# Patient Record
Sex: FEMALE | NOT HISPANIC OR LATINO | URBAN - METROPOLITAN AREA
[De-identification: names, ages, dates, MRNs, and addresses within clinical notes are randomized per-mention and may not be internally consistent; named-entity substitution may affect disease eponyms.]

---

## 2017-07-11 ENCOUNTER — APPOINTMENT (OUTPATIENT)
Dept: WOMENS IMAGING | Facility: HOSPITAL | Age: 57
End: 2017-07-11

## 2017-07-11 PROCEDURE — 77067 SCR MAMMO BI INCL CAD: CPT | Performed by: RADIOLOGY

## 2017-07-18 ENCOUNTER — APPOINTMENT (OUTPATIENT)
Dept: WOMENS IMAGING | Facility: HOSPITAL | Age: 57
End: 2017-07-18

## 2017-07-18 PROCEDURE — 76641 ULTRASOUND BREAST COMPLETE: CPT | Performed by: RADIOLOGY

## 2018-10-16 ENCOUNTER — APPOINTMENT (OUTPATIENT)
Dept: WOMENS IMAGING | Facility: HOSPITAL | Age: 58
End: 2018-10-16

## 2018-10-16 PROCEDURE — 77063 BREAST TOMOSYNTHESIS BI: CPT | Performed by: RADIOLOGY

## 2018-10-16 PROCEDURE — 77067 SCR MAMMO BI INCL CAD: CPT | Performed by: RADIOLOGY

## 2018-11-06 ENCOUNTER — APPOINTMENT (OUTPATIENT)
Dept: WOMENS IMAGING | Facility: HOSPITAL | Age: 58
End: 2018-11-06

## 2018-11-06 PROCEDURE — MDREVIEWSP: Performed by: RADIOLOGY

## 2018-11-06 PROCEDURE — 76641 ULTRASOUND BREAST COMPLETE: CPT | Performed by: RADIOLOGY

## 2020-07-06 ENCOUNTER — APPOINTMENT (OUTPATIENT)
Dept: WOMENS IMAGING | Facility: HOSPITAL | Age: 60
End: 2020-07-06

## 2020-07-06 PROCEDURE — 77067 SCR MAMMO BI INCL CAD: CPT | Performed by: RADIOLOGY

## 2021-03-17 ENCOUNTER — HOSPITAL ENCOUNTER (OUTPATIENT)
Dept: VACCINE CLINIC | Facility: HOSPITAL | Age: 61
Discharge: HOME | End: 2021-03-17
Attending: INTERNAL MEDICINE

## 2021-04-07 ENCOUNTER — HOSPITAL ENCOUNTER (OUTPATIENT)
Dept: VACCINE CLINIC | Facility: HOSPITAL | Age: 61
Discharge: HOME | End: 2021-04-07
Attending: INTERNAL MEDICINE

## 2023-10-03 ENCOUNTER — TELEPHONE (OUTPATIENT)
Dept: ORTHOPEDIC SURGERY | Facility: CLINIC | Age: 63
End: 2023-10-03
Payer: COMMERCIAL

## 2023-10-03 NOTE — TELEPHONE ENCOUNTER
[facility] CALLED TO GET PT SCHEDULED - ATTEMPTED TO WT - PLEASE CALL BACK .[phone]    FRACTURED RADIAL HEAD - RIGHT - FALL AT HOME - IMAGING DONE - NO SX    [facility] TO FAX OVER OV NOTES AND IMAGING REPORT

## 2023-10-03 NOTE — TELEPHONE ENCOUNTER
CALLED [facility] TO OBTAIN RECORDS - THEY STATED RECORDS WERE NOT FAXED TO US BECAUSE PAT. WAS ABLE TO GET APPT. TODAY W/KALEIGH & SAM INSTEAD

## 2024-08-12 ENCOUNTER — APPOINTMENT (OUTPATIENT)
Dept: WOMENS IMAGING | Facility: HOSPITAL | Age: 64
End: 2024-08-12
Payer: COMMERCIAL

## 2024-08-12 PROCEDURE — G0279 TOMOSYNTHESIS, MAMMO: HCPCS | Performed by: RADIOLOGY

## 2024-08-12 PROCEDURE — 76642 ULTRASOUND BREAST LIMITED: CPT | Performed by: RADIOLOGY

## 2024-08-12 PROCEDURE — 77061 BREAST TOMOSYNTHESIS UNI: CPT | Performed by: RADIOLOGY

## 2024-08-12 PROCEDURE — 77065 DX MAMMO INCL CAD UNI: CPT | Performed by: RADIOLOGY

## 2024-08-28 ENCOUNTER — APPOINTMENT (OUTPATIENT)
Dept: WOMENS IMAGING | Facility: HOSPITAL | Age: 64
End: 2024-08-28
Payer: COMMERCIAL

## 2024-08-28 ENCOUNTER — LAB REQUISITION (OUTPATIENT)
Dept: LAB | Facility: HOSPITAL | Age: 64
End: 2024-08-28
Payer: COMMERCIAL

## 2024-08-28 DIAGNOSIS — N63.0 UNSPECIFIED LUMP IN UNSPECIFIED BREAST: ICD-10-CM

## 2024-08-28 PROCEDURE — 88360 TUMOR IMMUNOHISTOCHEM/MANUAL: CPT | Performed by: STUDENT IN AN ORGANIZED HEALTH CARE EDUCATION/TRAINING PROGRAM

## 2024-08-28 PROCEDURE — 88341 IMHCHEM/IMCYTCHM EA ADD ANTB: CPT | Performed by: STUDENT IN AN ORGANIZED HEALTH CARE EDUCATION/TRAINING PROGRAM

## 2024-08-28 PROCEDURE — 88342 IMHCHEM/IMCYTCHM 1ST ANTB: CPT | Performed by: STUDENT IN AN ORGANIZED HEALTH CARE EDUCATION/TRAINING PROGRAM

## 2024-08-28 PROCEDURE — 88305 TISSUE EXAM BY PATHOLOGIST: CPT | Performed by: STUDENT IN AN ORGANIZED HEALTH CARE EDUCATION/TRAINING PROGRAM

## 2024-08-28 PROCEDURE — 19083 BX BREAST 1ST LESION US IMAG: CPT | Performed by: RADIOLOGY

## 2024-08-28 PROCEDURE — A4648 IMPLANTABLE TISSUE MARKER: HCPCS | Performed by: RADIOLOGY

## 2024-08-30 DIAGNOSIS — C50.919 MALIGNANT NEOPLASM OF FEMALE BREAST, UNSPECIFIED ESTROGEN RECEPTOR STATUS, UNSPECIFIED LATERALITY, UNSPECIFIED SITE OF BREAST: Primary | ICD-10-CM

## 2024-08-30 LAB
CYTO UR: NORMAL
DX PRELIMINARY: NORMAL
LAB AP CASE REPORT: NORMAL
LAB AP INTRADEPARTMENTAL CONSULT: NORMAL
LAB AP SPECIAL STAINS: NORMAL
LAB AP SYNOPTIC CHECKLIST: NORMAL
PATH REPORT.FINAL DX SPEC: NORMAL
PATH REPORT.GROSS SPEC: NORMAL

## 2024-09-04 ENCOUNTER — PATIENT OUTREACH (OUTPATIENT)
Dept: OTHER | Facility: HOSPITAL | Age: 64
End: 2024-09-04
Payer: COMMERCIAL

## 2024-09-04 DIAGNOSIS — C50.919 MALIGNANT NEOPLASM OF FEMALE BREAST, UNSPECIFIED ESTROGEN RECEPTOR STATUS, UNSPECIFIED LATERALITY, UNSPECIFIED SITE OF BREAST: Primary | ICD-10-CM

## 2024-09-04 NOTE — PROGRESS NOTES
Introductory call placed to Ms. Julien. Introduced myself and navigation services. She stated she is aware of appointment time and place. She will bring her  with her to the consult. She has no immediate needs at this time. Will meet her at her surgery appointment Sept 9th.

## 2024-09-09 ENCOUNTER — OFFICE VISIT (OUTPATIENT)
Dept: SURGERY | Facility: CLINIC | Age: 64
End: 2024-09-09
Payer: COMMERCIAL

## 2024-09-09 ENCOUNTER — PATIENT OUTREACH (OUTPATIENT)
Dept: OTHER | Facility: HOSPITAL | Age: 64
End: 2024-09-09
Payer: COMMERCIAL

## 2024-09-09 VITALS
WEIGHT: 165 LBS | BODY MASS INDEX: 27.49 KG/M2 | SYSTOLIC BLOOD PRESSURE: 130 MMHG | DIASTOLIC BLOOD PRESSURE: 82 MMHG | HEIGHT: 65 IN

## 2024-09-09 DIAGNOSIS — C50.919 MALIGNANT NEOPLASM OF FEMALE BREAST, UNSPECIFIED ESTROGEN RECEPTOR STATUS, UNSPECIFIED LATERALITY, UNSPECIFIED SITE OF BREAST: Primary | ICD-10-CM

## 2024-09-09 PROCEDURE — 99204 OFFICE O/P NEW MOD 45 MIN: CPT | Performed by: STUDENT IN AN ORGANIZED HEALTH CARE EDUCATION/TRAINING PROGRAM

## 2024-09-09 RX ORDER — ROSUVASTATIN CALCIUM 20 MG/1
20 TABLET, COATED ORAL DAILY
COMMUNITY
Start: 2024-07-12 | End: 2025-07-12

## 2024-09-09 RX ORDER — DIPHENOXYLATE HYDROCHLORIDE AND ATROPINE SULFATE 2.5; .025 MG/1; MG/1
TABLET ORAL DAILY
COMMUNITY

## 2024-09-09 RX ORDER — LEVOTHYROXINE SODIUM 25 UG/1
25 TABLET ORAL
COMMUNITY
Start: 2024-07-12 | End: 2025-07-12

## 2024-09-09 NOTE — PROGRESS NOTES
Met MsKieran Anaya before her surgery consult. I introduced myself and navigational services. She has a newly diagnosed Left breast invasive mammary carcinoma; ER/AL+ Her2-; Ki-67 35%. Stage Ia. She will discuss surgery options with Dr. Edouard and call me with any questions after the consult.     She stated she has a wonderful support system with her  and family. She stated she feels comfortable talking to them about needs or issues.      She stated she has no financial or transportation concerns at this time. She has no resource needs or ongoing concerns at this time.      She stated she is doing well emotionally at this time. We discussed we have support options if the need arises. She was thankful for the information.      We discussed integrative therapies and other services [facility]. She received a navigation folder with the following information:     [facility], Cancer and Restorative Exercise (CARE), [employer] Exercise program, Guide for the Newly Diagnosed, [employer], [facility], Massage Therapy, Reiki Therapy, [employer] [place], Cancer Nutrition, and Survivorship Clinic.     She verbalized appreciation for navigational services and she has my contact information and will call with any questions that arise.

## 2024-09-09 NOTE — PROGRESS NOTES
General Surgery Breast Cancer History and Physical Exam     Summary:    Fátima Julien is a 64 y.o. lady who presents with a new diagnosis of left breast invasive mammary carcinoma with DCIS: Grade III, Ki-67 35%, ER+/AL+, Her2-; [id number], Stage I.      A multidisciplinary plan has been formulated for the patient:    (1) Breast Surgical Oncology:  -Follow up [employer] 9 panel genetic testing. I will call her with results.   -MRI to evaluate anatomy as well as for surgical planning. Scheduled 9/16/2024.  -Nurse navigator consult.   -Surgical plan: She believes she would like to proceed with left breast JUSTICE guided lumpectomy with sentinel lymph node biopsy pending MRI results.  -Plastic surgery referral declined.    (2) Medical Oncology:  -Will refer postoperatively for evaluation for endocrine therapy and possible need for chemotherapy.    (3) Radiation Oncology:  -Will refer postoperatively for evaluation for radiation therapy.    Referring Provider: No ref. provider found    Chief Complaint: abnormal breast imaging    History of Present Illness: Ms. Fátima Julien is a 64 y.o. year old lady, seen at the request of No ref. provider found for a new diagnosis of left breast cancer.      This was initially detected as an imaging abnormality. Her last mammogram was 3 years ago, prior to that she was annual. She has had prior bilateral breast biopsy, which all returned as benign. Her work-up is detailed in the oncologic history below.     She denies any breast lumps, pain, skin changes, or nipple discharge. She has a family history of breast cancer in a maternal aunt (age mid to late 30's; contralateral metastatic breast cancer in her 70's). She denies any family history of ovarian cancer.     Workup of Current Diagnosis:    2/20/2024 bilateral Screening Mammogram:  There are scattered areas of fibroglandular density.  Finding 1: There is a focal asymmetry measuring 15 mm with indistinct margins in the anterior one  third of the left breast at 9:00 subareolar.  Suspicious.  Requires additional evaluation.  Finding 2: There is a focal asymmetry measuring 22 mm in the one third region of the left breast at 12-1 o'clock.  Requires additional evaluation.  BI-RADS Category 0    2024 left breast diagnostic Mammogram with Ultrasound:   There are scattered areas of fibroglandular density.  Finding 1: On present exam there is a new isodense irregular mass measuring 15 mm with indistinct margins in the anterior one third of the left breast at 9:00 areolar edge.  Suspicious.  Ultrasound-guided biopsy is recommended.  Finding 2: Additional evaluation was performed for focal asymmetry in the left breast at 12-1 o'clock seen on screening mammogram.  This completely resolves into benign fibroglandular breast tissue.  No sonographic correlate.  Benign.  BI-RADS Category 4C    2024 left breast ultrasound-guided biopsy:   Left breast at 9:00 was imaged and the mass was localized.  12 core specimens were obtained.  A 2 marked vision globe tissue marker was placed.  Clip was in the expected position.  Pathology returned as invasive ductal carcinoma with DCIS which is malignant and concordant.    2024 Pathology:   Final Diagnosis   1.  Breast, left 9 o'clock position AR edge, core biopsy: (Vision clip)  A.  Invasive mammary carcinoma, no special type (ductal), Perry histologic grade 3 (tubules = 3, nuclei = 3, mitoses = 2), measuring 9 mm maximally, and involving approximately 9 core fragments.  B.  Minimal associated ductal carcinoma in situ (DCIS) high nuclear grade with solid and cribriform architecture and spotty single cell necrosis.  C.  No definitive lymphovascular space invasion identified.     2024 Bilateral Breast MRI:       Gynecologic History:   . P:1 AB:1  Age at first childbirth: 22  Lactation/How long: yes  Age at menarche: 17  Age at menopause: 53  Total years of oral contraceptive use: 8 to 10 years  previously  Total years of hormone replacement therapy: On estradiol cream    Past Medical History:   HLD  Hypothyroidism     Past Surgical History:    L carpal tunnel release  Hysterectomy   Right knee arthroscopy   Partial thyroidectomy  Colonoscopy due next year, had one prior     Family History:    As above    Social History:  Denies tobacco use  Occasional alcohol use    Allergies:   Allergies   Allergen Reactions    Penicillin G Nausea And Vomiting     Beta lactam allergy details  Antibiotic reaction:  (n/v)  Age at reaction: adult  Dose to reaction time: unknown  Reason for antibiotic: unknown  Epinephrine required for reaction?: unknown  Tolerated antibiotics: unknown       Tetanus Toxoid Hives     Medications:     Current Outpatient Medications:     levothyroxine (SYNTHROID, LEVOTHROID) 25 MCG tablet, Take 1 tablet by mouth., Disp: , Rfl:     multivitamin (MULTIVITAMIN PO), Take  by mouth Daily., Disp: , Rfl:     rosuvastatin (CRESTOR) 20 MG tablet, Take 1 tablet by mouth Daily., Disp: , Rfl:     VITAMIN D PO, Take 1 tablet by mouth Daily., Disp: , Rfl:     Laboratory Values:    Labs from 5/4/2023 reviewed by me     Review of Systems:   Influenza-like illness: no fever, no  cough, no  sore throat, no  body aches, no loss of sense of taste or smell, no known exposure to person with Covid-19.  Constitutional: Negative for fevers or chills  HENT: Negative for hearing loss or runny nose  Eyes: Negative for vision changes or scleral icterus  Respiratory: Negative for cough or shortness of breath  Cardiovascular: Negative for chest pain or heart palpitations  Gastrointestinal: Negative for abdominal pain, nausea, vomiting, constipation, melena, or hematochezia  Genitourinary: Negative for hematuria or dysuria  Musculoskeletal: Negative for joint swelling or gait instability  Neurologic: Negative for tremors or seizures  Psychiatric: Negative for suicidal ideations or depression  All other systems reviewed and  negative    Physical Exam:   ECO - Asymptomatic  Constitutional: Well-developed well-nourished, no acute distress  Eyes: Conjunctiva normal, sclera nonicteric  ENMT: Hearing grossly normal, oral mucosa moist  Neck: Supple, no palpable mass, trachea midline  Respiratory: Clear to auscultation, normal inspiratory effort  Cardiovascular: Regular rate, no peripheral edema, no jugular venous distention  Breast: symmetric  Right: No visible abnormalities on inspection while seated, with arms raised or hands on hips. No masses, skin changes, or nipple abnormalities.  Left: No visible abnormalities on inspection while seated, with arms raised or hands on hips. No masses, skin changes, or nipple abnormalities.  Biopsy site appreciated in left breast at 9:00 at the NAC edge, otherwise no skin changes.   No clinical chest wall involvement.  Gastrointestinal: Soft, nontender  Lymphatics (palpable nodes): No cervical, supraclavicular or axillary lymphadenopathy  Skin:  Warm, dry, no rash on visualized skin surfaces  Musculoskeletal: Symmetric strength, normal gait  Psychiatric: Alert and oriented ×3, normal affect     Discussion:  I had an extensive discussion with the patient and her family about the nature of her breast cancer diagnosis. We reviewed the components of breast tissue including ducts and lobules. We reviewed her pathology report in detail. We reviewed breast cancer histology, including stage, grade, ER/WA receptors, HER2 receptors and how this applies to her diagnosis. We reviewed the basics of systemic and local/regional management of breast cancer.     We discussed that most breast cancer is not hereditary, however given her family history, this may play a role in her case. I believe genetic testing is warranted and could affect surgical decision making.     We reviewed potential surgical treatments to include partial mastectomy, mastectomy, sentinel lymph node biopsy and axillary node dissection and  discussed the rationale associated with each approach. Regarding radiation therapy, we discussed that radiation is indicated in all cases of breast conservation and in only limited circumstances following mastectomy. We discussed that the primary goal of adjuvant radiation is to decrease the likelihood of local recurrence.     In her case, she is a good candidate for lumpectomy and she would like to proceed with breast conservation. We discussed that lumpectomy would require preoperative wire-localization. We also discussed the risk of positive margins and that she must have negative margins for lumpectomy to be an appropriate oncologic procedure. I will make every effort to obtain negative margins at her initial operation, but there is a 10-15% chance that she will require a second operation for re-excision, or possibly a total mastectomy. We will not know the margin status until after her final pathology has returned.     We discussed axillary staging. I described the procedure for sentinel lymph node biopsy in detail, including the preoperative injection of technetium sulfur colloid and intraoperative injection of lymphazurin blue dye. I explained that this is a mapping test and not a cancer test, that all of the lymph nodes containing these dyes will be removed for complete testing by pathology, and that the results could impact the decision for adjuvant treatment or additional surgery.    I described additional risks and potential complications associated with surgery, including, but not limited to, bleeding, infection, complications related to blue dye, lymphedema, deformity/poor cosmetic result, chronic pain, neurovascular injury, numbness, seroma, hematoma, deep venous thrombosis, skin flap necrosis, disease recurrence and the possibility of requiring additional surgery. We also discussed other treatment options including the option of not undergoing any surgical treatment and the risks associated with this  including disease progression. She expressed an understanding of these factors and wished to proceed.    We discussed that in her case, systemic treatment would involve endocrine therapy and possibly chemotherapy. She will be referred to medical oncology postoperatively to discuss this further.     EUGENE ROBLES M.D.  General and Endoscopic Surgery  [facility]    [address], [address]  [place], [place], [address]  P: [phone]  F: [phone]

## 2024-09-11 ENCOUNTER — PREP FOR SURGERY (OUTPATIENT)
Dept: OTHER | Facility: HOSPITAL | Age: 64
End: 2024-09-11
Payer: COMMERCIAL

## 2024-09-11 ENCOUNTER — PATIENT OUTREACH (OUTPATIENT)
Dept: OTHER | Facility: HOSPITAL | Age: 64
End: 2024-09-11
Payer: COMMERCIAL

## 2024-09-11 DIAGNOSIS — C50.919 MALIGNANT NEOPLASM OF FEMALE BREAST, UNSPECIFIED ESTROGEN RECEPTOR STATUS, UNSPECIFIED LATERALITY, UNSPECIFIED SITE OF BREAST: Primary | ICD-10-CM

## 2024-09-11 RX ORDER — DIAZEPAM 5 MG
10 TABLET ORAL ONCE
OUTPATIENT
Start: 2024-09-11 | End: 2024-09-11

## 2024-09-11 NOTE — PROGRESS NOTES
MsKieran Anaya called with her surgery decision. She stated she feels most comfortable having a double mastectomy with reconstruction. Message sent to Dr. Edouard's office asking to place order for plastics and surgery. She was thankful for the help.

## 2024-09-16 ENCOUNTER — HOSPITAL ENCOUNTER (OUTPATIENT)
Facility: HOSPITAL | Age: 64
Discharge: HOME | End: 2024-09-16
Admitting: STUDENT IN AN ORGANIZED HEALTH CARE EDUCATION/TRAINING PROGRAM
Payer: COMMERCIAL

## 2024-09-16 ENCOUNTER — TELEPHONE (OUTPATIENT)
Dept: SURGERY | Facility: CLINIC | Age: 64
End: 2024-09-16
Payer: COMMERCIAL

## 2024-09-16 DIAGNOSIS — C50.919 MALIGNANT NEOPLASM OF FEMALE BREAST, UNSPECIFIED ESTROGEN RECEPTOR STATUS, UNSPECIFIED LATERALITY, UNSPECIFIED SITE OF BREAST: ICD-10-CM

## 2024-09-16 PROCEDURE — 77049 MRI BREAST C-+ W/CAD BI: CPT

## 2024-09-16 PROCEDURE — 0 GADOBENATE DIMEGLUMINE 529 MG/ML SOLUTION: Performed by: STUDENT IN AN ORGANIZED HEALTH CARE EDUCATION/TRAINING PROGRAM

## 2024-09-16 PROCEDURE — A9577 INJ MULTIHANCE: HCPCS | Performed by: STUDENT IN AN ORGANIZED HEALTH CARE EDUCATION/TRAINING PROGRAM

## 2024-09-16 RX ADMIN — GADOBENATE DIMEGLUMINE 15 ML: 529 INJECTION, SOLUTION INTRAVENOUS at 11:17

## 2024-09-17 DIAGNOSIS — R92.8 ABNORMAL MAMMOGRAM: Primary | ICD-10-CM

## 2024-09-18 ENCOUNTER — PATIENT OUTREACH (OUTPATIENT)
Dept: OTHER | Facility: HOSPITAL | Age: 64
End: 2024-09-18
Payer: COMMERCIAL

## 2024-09-18 ENCOUNTER — TELEPHONE (OUTPATIENT)
Dept: SURGERY | Facility: CLINIC | Age: 64
End: 2024-09-18
Payer: COMMERCIAL

## 2024-09-18 PROBLEM — C50.919 MALIGNANT NEOPLASM OF FEMALE BREAST: Status: ACTIVE | Noted: 2024-09-11

## 2024-09-20 ENCOUNTER — PREP FOR SURGERY (OUTPATIENT)
Dept: OTHER | Facility: HOSPITAL | Age: 64
End: 2024-09-20
Payer: COMMERCIAL

## 2024-09-20 DIAGNOSIS — C50.919 MALIGNANT NEOPLASM OF FEMALE BREAST, UNSPECIFIED ESTROGEN RECEPTOR STATUS, UNSPECIFIED LATERALITY, UNSPECIFIED SITE OF BREAST: Primary | ICD-10-CM

## 2024-09-25 ENCOUNTER — PATIENT OUTREACH (OUTPATIENT)
Dept: OTHER | Facility: HOSPITAL | Age: 64
End: 2024-09-25
Payer: COMMERCIAL

## 2024-10-14 NOTE — NURSING NOTE
Precall placed/no answer/VMM left with my name/#/reason for call/adv. santana @ 0845 @ [facility] Mammo Dept (directns left)/no Aspirin/NSAIDs/Vit E/Fish oil after today/wear bra/may eat/drink/take meds on list prior/need  if wants Valium/call if questns.

## 2024-10-18 ENCOUNTER — HOSPITAL ENCOUNTER (OUTPATIENT)
Dept: MRI IMAGING | Facility: HOSPITAL | Age: 64
Discharge: HOME | End: 2024-10-18
Payer: COMMERCIAL

## 2024-10-18 ENCOUNTER — HOSPITAL ENCOUNTER (OUTPATIENT)
Dept: MAMMOGRAPHY | Facility: HOSPITAL | Age: 64
Discharge: HOME | End: 2024-10-18
Payer: COMMERCIAL

## 2024-10-18 VITALS
SYSTOLIC BLOOD PRESSURE: 116 MMHG | HEART RATE: 67 BPM | OXYGEN SATURATION: 100 % | RESPIRATION RATE: 16 BRPM | BODY MASS INDEX: 27.31 KG/M2 | DIASTOLIC BLOOD PRESSURE: 71 MMHG | WEIGHT: 160 LBS | HEIGHT: 64 IN | TEMPERATURE: 97.3 F

## 2024-10-18 DIAGNOSIS — C50.919 MALIGNANT NEOPLASM OF FEMALE BREAST, UNSPECIFIED ESTROGEN RECEPTOR STATUS, UNSPECIFIED LATERALITY, UNSPECIFIED SITE OF BREAST: ICD-10-CM

## 2024-10-18 DIAGNOSIS — R92.8 ABNORMAL MAMMOGRAM: ICD-10-CM

## 2024-10-18 LAB — CREAT BLDA-MCNC: 0.9 MG/DL (ref 0.6–1.3)

## 2024-10-18 PROCEDURE — 25010000002 LIDOCAINE-EPINEPHRINE 1 %-1:200000 SOLUTION: Performed by: STUDENT IN AN ORGANIZED HEALTH CARE EDUCATION/TRAINING PROGRAM

## 2024-10-18 PROCEDURE — C1894 INTRO/SHEATH, NON-LASER: HCPCS

## 2024-10-18 PROCEDURE — 25010000002 LIDOCAINE 1 % SOLUTION: Performed by: STUDENT IN AN ORGANIZED HEALTH CARE EDUCATION/TRAINING PROGRAM

## 2024-10-18 PROCEDURE — 0 GADOBENATE DIMEGLUMINE 529 MG/ML SOLUTION: Performed by: STUDENT IN AN ORGANIZED HEALTH CARE EDUCATION/TRAINING PROGRAM

## 2024-10-18 PROCEDURE — A9577 INJ MULTIHANCE: HCPCS | Performed by: STUDENT IN AN ORGANIZED HEALTH CARE EDUCATION/TRAINING PROGRAM

## 2024-10-18 PROCEDURE — 82565 ASSAY OF CREATININE: CPT

## 2024-10-18 PROCEDURE — A4648 IMPLANTABLE TISSUE MARKER: HCPCS

## 2024-10-18 PROCEDURE — 77065 DX MAMMO INCL CAD UNI: CPT

## 2024-10-18 RX ORDER — DIAZEPAM 5 MG
5 TABLET ORAL ONCE AS NEEDED
Status: CANCELLED | OUTPATIENT
Start: 2024-10-18

## 2024-10-18 RX ORDER — DIAZEPAM 5 MG
5 TABLET ORAL ONCE AS NEEDED
Status: COMPLETED | OUTPATIENT
Start: 2024-10-18 | End: 2024-10-18

## 2024-10-18 RX ORDER — LIDOCAINE HYDROCHLORIDE 10 MG/ML
10 INJECTION, SOLUTION INFILTRATION; PERINEURAL ONCE
Status: COMPLETED | OUTPATIENT
Start: 2024-10-18 | End: 2024-10-18

## 2024-10-18 RX ADMIN — LIDOCAINE HYDROCHLORIDE 1 ML: 10 INJECTION, SOLUTION INFILTRATION; PERINEURAL at 11:18

## 2024-10-18 RX ADMIN — LIDOCAINE HYDROCHLORIDE 15 ML: 10; .005 INJECTION, SOLUTION EPIDURAL; INFILTRATION; INTRACAUDAL; PERINEURAL at 11:18

## 2024-10-18 RX ADMIN — GADOBENATE DIMEGLUMINE 15 ML: 529 INJECTION, SOLUTION INTRAVENOUS at 11:06

## 2024-10-18 RX ADMIN — DIAZEPAM 5 MG: 5 TABLET ORAL at 10:40

## 2024-10-18 NOTE — NURSING NOTE
Dr. Pelayo in to consent patient. She calls Dr. Edouard to question if both sides need to be biopsied since patient is now going to have bilateral mastectomy. Dr. Edouard and Pierce agree that only the right side needs to be biopsied since patient is now going to have bilateral mastectomies. Patient consented for Rt. Mri biopsy.

## 2024-10-18 NOTE — NURSING NOTE
Biopsy site to right breast clear with Dermabond dry and intact. No firmness or swelling noted at or around biopsy site. Denies pain. Ice pack with protective covering applied to biopsy site. Discharge instructions discussed with understanding voiced by patient. Copies provided to patient. No distress noted. To home via personal vehicle.

## 2024-10-18 NOTE — NURSING NOTE
Spoke with Mary regarding laterality of Mrs. Julien's MRI biopsy today. Patient voiced that she was going to have bilateral masectomy she only had to have right MRI biopsy. Dr. Pelayo's recommendations recommends bilateral. Mary also sees orders for bilateral.

## 2024-10-19 ENCOUNTER — TELEPHONE (OUTPATIENT)
Dept: RADIOLOGY | Facility: HOSPITAL | Age: 64
End: 2024-10-19
Payer: COMMERCIAL

## 2024-10-21 ENCOUNTER — PRE-ADMISSION TESTING (OUTPATIENT)
Dept: PREADMISSION TESTING | Facility: HOSPITAL | Age: 64
End: 2024-10-21
Payer: COMMERCIAL

## 2024-10-21 VITALS
BODY MASS INDEX: 28.58 KG/M2 | RESPIRATION RATE: 18 BRPM | HEIGHT: 64 IN | WEIGHT: 167.4 LBS | TEMPERATURE: 97.2 F | HEART RATE: 78 BPM | OXYGEN SATURATION: 99 % | SYSTOLIC BLOOD PRESSURE: 123 MMHG | DIASTOLIC BLOOD PRESSURE: 71 MMHG

## 2024-10-21 LAB
ANION GAP SERPL CALCULATED.3IONS-SCNC: 11.6 MMOL/L (ref 5–15)
BUN SERPL-MCNC: 15 MG/DL (ref 8–23)
BUN/CREAT SERPL: 17.6 (ref 7–25)
CALCIUM SPEC-SCNC: 9.6 MG/DL (ref 8.6–10.5)
CHLORIDE SERPL-SCNC: 103 MMOL/L (ref 98–107)
CO2 SERPL-SCNC: 24.4 MMOL/L (ref 22–29)
CREAT SERPL-MCNC: 0.85 MG/DL (ref 0.57–1)
CYTO UR: NORMAL
DEPRECATED RDW RBC AUTO: 41.7 FL (ref 37–54)
EGFRCR SERPLBLD CKD-EPI 2021: 77.6 ML/MIN/1.73
ERYTHROCYTE [DISTWIDTH] IN BLOOD BY AUTOMATED COUNT: 12.8 % (ref 12.3–15.4)
GLUCOSE SERPL-MCNC: 103 MG/DL (ref 65–99)
HCT VFR BLD AUTO: 42.7 % (ref 34–46.6)
HGB BLD-MCNC: 13.9 G/DL (ref 12–15.9)
LAB AP CASE REPORT: NORMAL
MCH RBC QN AUTO: 28.9 PG (ref 26.6–33)
MCHC RBC AUTO-ENTMCNC: 32.6 G/DL (ref 31.5–35.7)
MCV RBC AUTO: 88.8 FL (ref 79–97)
PATH REPORT.FINAL DX SPEC: NORMAL
PATH REPORT.GROSS SPEC: NORMAL
PLATELET # BLD AUTO: 277 10*3/MM3 (ref 140–450)
PMV BLD AUTO: 9.3 FL (ref 6–12)
POTASSIUM SERPL-SCNC: 4.1 MMOL/L (ref 3.5–5.2)
RBC # BLD AUTO: 4.81 10*6/MM3 (ref 3.77–5.28)
SODIUM SERPL-SCNC: 139 MMOL/L (ref 136–145)
WBC NRBC COR # BLD AUTO: 5.87 10*3/MM3 (ref 3.4–10.8)

## 2024-10-21 PROCEDURE — 36415 COLL VENOUS BLD VENIPUNCTURE: CPT

## 2024-10-21 PROCEDURE — 80048 BASIC METABOLIC PNL TOTAL CA: CPT

## 2024-10-21 PROCEDURE — 85027 COMPLETE CBC AUTOMATED: CPT

## 2024-10-21 NOTE — DISCHARGE INSTRUCTIONS
Take the following medications the morning of surgery:  LEVOTHYROXINE      If you are on prescription narcotic pain medication to control your pain you may also take that medication the morning of surgery.      General Instructions:     Do not eat solid food after midnight the night before surgery.  Clear liquids day of surgery are allowed but must be stopped at least two hours before your hospital arrival time.       Allowed clear liquids      Water, sodas, and tea or coffee with no cream or milk added.       12 to 20 ounces of a clear liquid that contains carbohydrates is recommended.  If non-diabetic, have Gatorade or Powerade.  If diabetic, have G2 or Powerade Zero.     Do not have liquids red in color.  Do not consume chicken, beef, pork or vegetable broth or bouillon cubes of any variety as they are not considered clear liquids and are not allowed.      Infants may have breast milk up to four hours before surgery.  Infants drinking formula may drink formula up to six hours before surgery.   Patients who avoid smoking, chewing tobacco and alcohol for 4 weeks prior to surgery have a reduced risk of post-operative complications.  Quit smoking as many days before surgery as you can.  Do not smoke, use chewing tobacco or drink alcohol the day of surgery.   If applicable bring your C-PAP/ BI-PAP machine in with you to preop day of surgery.  Bring any papers given to you in the doctor’s office.  Wear clean comfortable clothes.  Do not wear contact lenses, false eyelashes or make-up.  Bring a case for your glasses.   Bring crutches or walker if applicable.  Remove all piercings.  Leave jewelry and any other valuables at home.  Hair extensions with metal clips must be removed prior to surgery.  The Pre-Admission Testing nurse will instruct you to bring medications if unable to obtain an accurate list in Pre-Admission Testing.        If you were given a blood bank ID arm band remember to bring it with you the day of  surgery.    Preventing a Surgical Site Infection:  For 2 to 3 days before surgery, avoid shaving with a razor because the razor can irritate skin and make it easier to develop an infection.    Any areas of open skin can increase the risk of a post-operative wound infection by allowing bacteria to enter and travel throughout the body.  Notify your surgeon if you have any skin wounds / rashes even if it is not near the expected surgical site.  The area will need assessed to determine if surgery should be delayed until it is healed.  The night prior to surgery shower using a fresh bar of anti-bacterial soap (such as Dial) and clean washcloth.  Sleep in a clean bed with clean clothing.  Do not allow pets to sleep with you.  Shower on the morning of surgery using a fresh bar of anti-bacterial soap (such as Dial) and clean washcloth.  Dry with a clean towel and dress in clean clothing.  Ask your surgeon if you will be receiving antibiotics prior to surgery.  Make sure you, your family, and all healthcare providers clean their hands with soap and water or an alcohol based hand  before caring for you or your wound.      CHLORHEXIDINE CLOTH INSTRUCTIONS  The morning of surgery follow these instructions using the Chlorhexidine cloths you've been given.  These steps reduce bacteria on the body.  Do not use the cloths near your eyes, ears mouth, genitalia or on open wounds.  Throw the cloths away after use but do not try to flush them down a toilet.      Open and remove one cloth at a time from the package.    Leave the cloth unfolded and begin the bathing.  Massage the skin with the cloths using gentle pressure to remove bacteria.  Do not scrub harshly.   Follow the steps below with one 2% CHG cloth per area (6 total cloths).  One cloth for neck, shoulders and chest.  One cloth for both arms, hands, fingers and underarms (do underarms last).  One cloth for the abdomen followed by groin.  One cloth for right leg and  foot including between the toes.  One cloth for left leg and foot including between the toes.  The last cloth is to be used for the back of the neck, back and buttocks.    Allow the CHG to air dry 3 minutes on the skin which will give it time to work and decrease the chance of irritation.  The skin may feel sticky until it is dry.  Do not rinse with water or any other liquid or you will lose the beneficial effects of the CHG.  If mild skin irritation occurs, do rinse the skin to remove the CHG.  Report this to the nurse at time of admission.  Do not apply lotions, creams, ointments, deodorants or perfumes after using the clothes. Dress in clean clothes before coming to the hospital.      Day of surgery:  Your arrival time is approximately two hours before your scheduled surgery time.  Please note if you have an early arrival time the surgery doors do not open before 5:00 AM.  Upon arrival, a Pre-op nurse and Anesthesiologist will review your health history, obtain vital signs, and answer questions you may have.  The only belongings needed at this time will be a list of your home medications and if applicable your C-PAP/BI-PAP machine.  A Pre-op nurse will start an IV and you may receive medication in preparation for surgery, including something to help you relax.     Please be aware that surgery does come with discomfort.  We want to make every effort to control your discomfort so please discuss any uncontrolled symptoms with your nurse.   Your doctor will most likely have prescribed pain medications.      If you are going home after surgery you will receive individualized written care instructions before being discharged.  A responsible adult must drive you to and from the hospital on the day of your surgery and ideally stay with you through the night.   .  Discharge prescriptions can be filled by the hospital pharmacy during regular pharmacy hours.  If you are having surgery late in the day/evening your  prescription may be e-prescribed to your pharmacy.  Please verify your pharmacy hours or chose a 24 hour pharmacy to avoid not having access to your prescription because your pharmacy has closed for the day.    If you are staying overnight following surgery, you will be transported to your hospital room following the recovery period.  [facility] has all private rooms.    If you have any questions please call Pre-Admission Testing at [phone].  Deductibles and co-payments are collected on the day of service. Please be prepared to pay the required co-pay, deductible or deposit on the day of service as defined by your plan.    Call your surgeon immediately if you experience any of the following symptoms:  Sore Throat  Shortness of Breath or difficulty breathing  Cough  Chills  Body soreness or muscle pain  Headache  Fever  New loss of taste or smell  Do not arrive for your surgery ill.  Your procedure will need to be rescheduled to another time.  You will need to call your physician before the day of surgery to avoid any unnecessary exposure to hospital staff as well as other patients.

## 2024-10-23 ENCOUNTER — TELEPHONE (OUTPATIENT)
Dept: SURGERY | Facility: CLINIC | Age: 64
End: 2024-10-23
Payer: COMMERCIAL

## 2024-10-23 NOTE — TELEPHONE ENCOUNTER
Patient was calling to see if you could give her results of 10/18/24 Biopsy. Please advise, Thank You

## 2024-10-24 ENCOUNTER — TELEPHONE (OUTPATIENT)
Dept: MAMMOGRAPHY | Facility: HOSPITAL | Age: 64
End: 2024-10-24
Payer: COMMERCIAL

## 2024-10-24 NOTE — TELEPHONE ENCOUNTER
Telephoned patient to give breast biopsy results. These returned as benign and patient had no questions or concerns.

## 2024-10-30 ENCOUNTER — PATIENT OUTREACH (OUTPATIENT)
Dept: OTHER | Facility: HOSPITAL | Age: 64
End: 2024-10-30
Payer: COMMERCIAL

## 2024-10-30 NOTE — PROGRESS NOTES
Reviewed chart. Scheduled for surgery tomorrow.    Called patient.  Introduced myself and explained that I work with Gris, breast navigator.  She is ready for surgery tomorrow. She and her  are staying locally tonight (they live in [place]).  The patient has no questions or concerns.    Gris will continue to follow; encouraged her to contact Gris if the need arises. Patient verbalized understanding.

## 2024-10-31 ENCOUNTER — HOSPITAL ENCOUNTER (OUTPATIENT)
Facility: HOSPITAL | Age: 64
Setting detail: OBSERVATION
Discharge: HOME | End: 2024-11-01
Attending: STUDENT IN AN ORGANIZED HEALTH CARE EDUCATION/TRAINING PROGRAM | Admitting: STUDENT IN AN ORGANIZED HEALTH CARE EDUCATION/TRAINING PROGRAM
Payer: COMMERCIAL

## 2024-10-31 ENCOUNTER — ANCILLARY PROCEDURE (OUTPATIENT)
Dept: LAB | Facility: HOSPITAL | Age: 64
End: 2024-10-31
Payer: COMMERCIAL

## 2024-10-31 ENCOUNTER — ANESTHESIA EVENT (OUTPATIENT)
Dept: PERIOP | Facility: HOSPITAL | Age: 64
End: 2024-10-31
Payer: COMMERCIAL

## 2024-10-31 ENCOUNTER — TRANSCRIBE ORDERS (OUTPATIENT)
Dept: LAB | Facility: HOSPITAL | Age: 64
End: 2024-10-31
Payer: COMMERCIAL

## 2024-10-31 ENCOUNTER — HOSPITAL ENCOUNTER (OUTPATIENT)
Dept: NUCLEAR MEDICINE | Facility: HOSPITAL | Age: 64
Discharge: HOME | End: 2024-10-31
Payer: COMMERCIAL

## 2024-10-31 ENCOUNTER — ANESTHESIA (OUTPATIENT)
Dept: PERIOP | Facility: HOSPITAL | Age: 64
End: 2024-10-31
Payer: COMMERCIAL

## 2024-10-31 DIAGNOSIS — C50.912 INVASIVE DUCTAL CARCINOMA OF LEFT BREAST IN FEMALE: ICD-10-CM

## 2024-10-31 DIAGNOSIS — C50.912 INVASIVE DUCTAL CARCINOMA OF LEFT BREAST IN FEMALE: Primary | ICD-10-CM

## 2024-10-31 DIAGNOSIS — C50.919 MALIGNANT NEOPLASM OF FEMALE BREAST, UNSPECIFIED ESTROGEN RECEPTOR STATUS, UNSPECIFIED LATERALITY, UNSPECIFIED SITE OF BREAST: ICD-10-CM

## 2024-10-31 DIAGNOSIS — C50.919 MALIGNANT NEOPLASM OF FEMALE BREAST, UNSPECIFIED ESTROGEN RECEPTOR STATUS, UNSPECIFIED LATERALITY, UNSPECIFIED SITE OF BREAST: Primary | ICD-10-CM

## 2024-10-31 PROCEDURE — 38900 IO MAP OF SENT LYMPH NODE: CPT | Performed by: SPECIALIST/TECHNOLOGIST, OTHER

## 2024-10-31 PROCEDURE — 76098 X-RAY EXAM SURGICAL SPECIMEN: CPT

## 2024-10-31 PROCEDURE — 25010000002 LIDOCAINE 1 % SOLUTION: Performed by: STUDENT IN AN ORGANIZED HEALTH CARE EDUCATION/TRAINING PROGRAM

## 2024-10-31 PROCEDURE — 25010000002 DEXAMETHASONE SODIUM PHOSPHATE 20 MG/5ML SOLUTION: Performed by: NURSE ANESTHETIST, CERTIFIED REGISTERED

## 2024-10-31 PROCEDURE — 0 TECHETIUM TC99M TILMANOCEPT: Performed by: STUDENT IN AN ORGANIZED HEALTH CARE EDUCATION/TRAINING PROGRAM

## 2024-10-31 PROCEDURE — 38792 RA TRACER ID OF SENTINL NODE: CPT

## 2024-10-31 PROCEDURE — 19303 MAST SIMPLE COMPLETE: CPT | Performed by: STUDENT IN AN ORGANIZED HEALTH CARE EDUCATION/TRAINING PROGRAM

## 2024-10-31 PROCEDURE — 25010000002 PROPOFOL 10 MG/ML EMULSION: Performed by: NURSE ANESTHETIST, CERTIFIED REGISTERED

## 2024-10-31 PROCEDURE — G0378 HOSPITAL OBSERVATION PER HR: HCPCS

## 2024-10-31 PROCEDURE — C9290 INJ, BUPIVACAINE LIPOSOME: HCPCS | Performed by: SURGERY

## 2024-10-31 PROCEDURE — 25010000002 CEFAZOLIN PER 500 MG: Performed by: SURGERY

## 2024-10-31 PROCEDURE — 25010000002 ONDANSETRON PER 1 MG: Performed by: NURSE ANESTHETIST, CERTIFIED REGISTERED

## 2024-10-31 PROCEDURE — 25010000002 MAGNESIUM SULFATE PER 500 MG OF MAGNESIUM: Performed by: NURSE ANESTHETIST, CERTIFIED REGISTERED

## 2024-10-31 PROCEDURE — 0 BUPIVACAINE LIPOSOME 1.3 % SUSPENSION 20 ML VIAL: Performed by: SURGERY

## 2024-10-31 PROCEDURE — 25810000003 SODIUM CHLORIDE 0.9 % SOLUTION 250 ML FLEX CONT: Performed by: NURSE ANESTHETIST, CERTIFIED REGISTERED

## 2024-10-31 PROCEDURE — 25010000002 PHENYLEPHRINE 10 MG/ML SOLUTION 5 ML VIAL: Performed by: NURSE ANESTHETIST, CERTIFIED REGISTERED

## 2024-10-31 PROCEDURE — 25010000002 INDOCYANINE GREEN 25 MG RECONSTITUTED SOLUTION: Performed by: NURSE ANESTHETIST, CERTIFIED REGISTERED

## 2024-10-31 PROCEDURE — 25010000002 CEFAZOLIN PER 500 MG: Performed by: STUDENT IN AN ORGANIZED HEALTH CARE EDUCATION/TRAINING PROGRAM

## 2024-10-31 PROCEDURE — 25010000002 LIDOCAINE 2% SOLUTION: Performed by: NURSE ANESTHETIST, CERTIFIED REGISTERED

## 2024-10-31 PROCEDURE — 38900 IO MAP OF SENT LYMPH NODE: CPT | Performed by: STUDENT IN AN ORGANIZED HEALTH CARE EDUCATION/TRAINING PROGRAM

## 2024-10-31 PROCEDURE — A9520 TC99 TILMANOCEPT DIAG 0.5MCI: HCPCS | Performed by: STUDENT IN AN ORGANIZED HEALTH CARE EDUCATION/TRAINING PROGRAM

## 2024-10-31 PROCEDURE — 25810000003 LACTATED RINGERS PER 1000 ML: Performed by: STUDENT IN AN ORGANIZED HEALTH CARE EDUCATION/TRAINING PROGRAM

## 2024-10-31 PROCEDURE — 25010000002 PROPOFOL 200 MG/20ML EMULSION: Performed by: NURSE ANESTHETIST, CERTIFIED REGISTERED

## 2024-10-31 PROCEDURE — 25010000002 HYDROMORPHONE PER 4 MG: Performed by: NURSE ANESTHETIST, CERTIFIED REGISTERED

## 2024-10-31 PROCEDURE — 25010000002 GLYCOPYRROLATE 1 MG/5ML SOLUTION: Performed by: NURSE ANESTHETIST, CERTIFIED REGISTERED

## 2024-10-31 PROCEDURE — 38525 BIOPSY/REMOVAL LYMPH NODES: CPT | Performed by: STUDENT IN AN ORGANIZED HEALTH CARE EDUCATION/TRAINING PROGRAM

## 2024-10-31 PROCEDURE — 88307 TISSUE EXAM BY PATHOLOGIST: CPT | Performed by: STUDENT IN AN ORGANIZED HEALTH CARE EDUCATION/TRAINING PROGRAM

## 2024-10-31 PROCEDURE — C1789 PROSTHESIS, BREAST, IMP: HCPCS | Performed by: STUDENT IN AN ORGANIZED HEALTH CARE EDUCATION/TRAINING PROGRAM

## 2024-10-31 PROCEDURE — 25010000002 CEFAZOLIN PER 500 MG: Performed by: NURSE ANESTHETIST, CERTIFIED REGISTERED

## 2024-10-31 PROCEDURE — 25010000002 SUGAMMADEX 200 MG/2ML SOLUTION: Performed by: NURSE ANESTHETIST, CERTIFIED REGISTERED

## 2024-10-31 PROCEDURE — 25010000002 GENTAMICIN PER 80 MG: Performed by: SURGERY

## 2024-10-31 PROCEDURE — 25010000002 BUPIVACAINE (PF) 0.25 % SOLUTION 10 ML VIAL: Performed by: SURGERY

## 2024-10-31 PROCEDURE — 19303 MAST SIMPLE COMPLETE: CPT | Performed by: SPECIALIST/TECHNOLOGIST, OTHER

## 2024-10-31 PROCEDURE — 25010000002 ISOSULFAN BLUE 1 % SOLUTION: Performed by: STUDENT IN AN ORGANIZED HEALTH CARE EDUCATION/TRAINING PROGRAM

## 2024-10-31 PROCEDURE — 88331 PATH CONSLTJ SURG 1 BLK 1SPC: CPT | Performed by: STUDENT IN AN ORGANIZED HEALTH CARE EDUCATION/TRAINING PROGRAM

## 2024-10-31 DEVICE — DEV CONTRL TISS STRATAFIXSPIRALMNCRYL PLSPS2 REV3/0 45CM: Type: IMPLANTABLE DEVICE | Site: BREAST | Status: FUNCTIONAL

## 2024-10-31 DEVICE — CLIP LIGAT VASC HORIZON TI MD BLU 6CT: Type: IMPLANTABLE DEVICE | Site: BREAST | Status: FUNCTIONAL

## 2024-10-31 DEVICE — IMPLANTABLE DEVICE: Type: IMPLANTABLE DEVICE | Site: BREAST | Status: FUNCTIONAL

## 2024-10-31 RX ORDER — LABETALOL HYDROCHLORIDE 5 MG/ML
5 INJECTION, SOLUTION INTRAVENOUS
Status: DISCONTINUED | OUTPATIENT
Start: 2024-10-31 | End: 2024-10-31 | Stop reason: HOSPADM

## 2024-10-31 RX ORDER — OXYCODONE HYDROCHLORIDE 5 MG/1
5 TABLET ORAL EVERY 4 HOURS PRN
Status: DISCONTINUED | OUTPATIENT
Start: 2024-10-31 | End: 2024-11-01 | Stop reason: HOSPADM

## 2024-10-31 RX ORDER — ISOSULFAN BLUE 50 MG/5ML
INJECTION, SOLUTION SUBCUTANEOUS AS NEEDED
Status: DISCONTINUED | OUTPATIENT
Start: 2024-10-31 | End: 2024-10-31 | Stop reason: HOSPADM

## 2024-10-31 RX ORDER — IPRATROPIUM BROMIDE AND ALBUTEROL SULFATE 2.5; .5 MG/3ML; MG/3ML
3 SOLUTION RESPIRATORY (INHALATION) ONCE AS NEEDED
Status: DISCONTINUED | OUTPATIENT
Start: 2024-10-31 | End: 2024-10-31 | Stop reason: HOSPADM

## 2024-10-31 RX ORDER — SODIUM CHLORIDE 0.9 % (FLUSH) 0.9 %
3-10 SYRINGE (ML) INJECTION AS NEEDED
Status: DISCONTINUED | OUTPATIENT
Start: 2024-10-31 | End: 2024-10-31 | Stop reason: HOSPADM

## 2024-10-31 RX ORDER — LIDOCAINE 40 MG/G
1 CREAM TOPICAL ONCE
Status: COMPLETED | OUTPATIENT
Start: 2024-10-31 | End: 2024-10-31

## 2024-10-31 RX ORDER — MAGNESIUM SULFATE HEPTAHYDRATE 500 MG/ML
INJECTION, SOLUTION INTRAMUSCULAR; INTRAVENOUS AS NEEDED
Status: DISCONTINUED | OUTPATIENT
Start: 2024-10-31 | End: 2024-10-31 | Stop reason: SURG

## 2024-10-31 RX ORDER — GLYCOPYRROLATE 0.2 MG/ML
INJECTION INTRAMUSCULAR; INTRAVENOUS AS NEEDED
Status: DISCONTINUED | OUTPATIENT
Start: 2024-10-31 | End: 2024-10-31 | Stop reason: SURG

## 2024-10-31 RX ORDER — GABAPENTIN 100 MG/1
100 CAPSULE ORAL 3 TIMES DAILY
Status: DISCONTINUED | OUTPATIENT
Start: 2024-10-31 | End: 2024-10-31 | Stop reason: SDUPTHER

## 2024-10-31 RX ORDER — NALOXONE HCL 0.4 MG/ML
0.2 VIAL (ML) INJECTION AS NEEDED
Status: DISCONTINUED | OUTPATIENT
Start: 2024-10-31 | End: 2024-10-31 | Stop reason: HOSPADM

## 2024-10-31 RX ORDER — ONDANSETRON 2 MG/ML
4 INJECTION INTRAMUSCULAR; INTRAVENOUS ONCE AS NEEDED
Status: DISCONTINUED | OUTPATIENT
Start: 2024-10-31 | End: 2024-10-31 | Stop reason: HOSPADM

## 2024-10-31 RX ORDER — LEVOTHYROXINE SODIUM 25 UG/1
25 TABLET ORAL
Status: DISCONTINUED | OUTPATIENT
Start: 2024-11-01 | End: 2024-11-01 | Stop reason: HOSPADM

## 2024-10-31 RX ORDER — PROPOFOL 10 MG/ML
INJECTION, EMULSION INTRAVENOUS AS NEEDED
Status: DISCONTINUED | OUTPATIENT
Start: 2024-10-31 | End: 2024-10-31 | Stop reason: SURG

## 2024-10-31 RX ORDER — SODIUM CHLORIDE 0.9 % (FLUSH) 0.9 %
10 SYRINGE (ML) INJECTION AS NEEDED
Status: DISCONTINUED | OUTPATIENT
Start: 2024-10-31 | End: 2024-11-01 | Stop reason: HOSPADM

## 2024-10-31 RX ORDER — KETAMINE HCL IN NACL, ISO-OSM 100MG/10ML
SYRINGE (ML) INJECTION AS NEEDED
Status: DISCONTINUED | OUTPATIENT
Start: 2024-10-31 | End: 2024-10-31 | Stop reason: SURG

## 2024-10-31 RX ORDER — HYDRALAZINE HYDROCHLORIDE 20 MG/ML
5 INJECTION INTRAMUSCULAR; INTRAVENOUS
Status: DISCONTINUED | OUTPATIENT
Start: 2024-10-31 | End: 2024-10-31 | Stop reason: HOSPADM

## 2024-10-31 RX ORDER — IBUPROFEN 800 MG/1
800 TABLET, FILM COATED ORAL EVERY 8 HOURS
Status: DISCONTINUED | OUTPATIENT
Start: 2024-10-31 | End: 2024-11-01 | Stop reason: HOSPADM

## 2024-10-31 RX ORDER — LIDOCAINE HYDROCHLORIDE 20 MG/ML
INJECTION, SOLUTION INFILTRATION; PERINEURAL AS NEEDED
Status: DISCONTINUED | OUTPATIENT
Start: 2024-10-31 | End: 2024-10-31 | Stop reason: SURG

## 2024-10-31 RX ORDER — DROPERIDOL 2.5 MG/ML
0.62 INJECTION, SOLUTION INTRAMUSCULAR; INTRAVENOUS
Status: DISCONTINUED | OUTPATIENT
Start: 2024-10-31 | End: 2024-10-31 | Stop reason: HOSPADM

## 2024-10-31 RX ORDER — EPHEDRINE SULFATE 50 MG/ML
5 INJECTION, SOLUTION INTRAVENOUS ONCE AS NEEDED
Status: DISCONTINUED | OUTPATIENT
Start: 2024-10-31 | End: 2024-10-31 | Stop reason: HOSPADM

## 2024-10-31 RX ORDER — ROSUVASTATIN CALCIUM 20 MG/1
20 TABLET, COATED ORAL DAILY
Status: DISCONTINUED | OUTPATIENT
Start: 2024-10-31 | End: 2024-11-01 | Stop reason: HOSPADM

## 2024-10-31 RX ORDER — SODIUM CHLORIDE 9 MG/ML
40 INJECTION, SOLUTION INTRAVENOUS AS NEEDED
Status: DISCONTINUED | OUTPATIENT
Start: 2024-10-31 | End: 2024-11-01 | Stop reason: HOSPADM

## 2024-10-31 RX ORDER — ONDANSETRON 2 MG/ML
4 INJECTION INTRAMUSCULAR; INTRAVENOUS EVERY 6 HOURS PRN
Status: DISCONTINUED | OUTPATIENT
Start: 2024-10-31 | End: 2024-11-01 | Stop reason: HOSPADM

## 2024-10-31 RX ORDER — SODIUM CHLORIDE 0.9 % (FLUSH) 0.9 %
10 SYRINGE (ML) INJECTION EVERY 12 HOURS SCHEDULED
Status: DISCONTINUED | OUTPATIENT
Start: 2024-10-31 | End: 2024-11-01 | Stop reason: HOSPADM

## 2024-10-31 RX ORDER — GABAPENTIN 300 MG/1
300 CAPSULE ORAL EVERY 8 HOURS SCHEDULED
Status: DISCONTINUED | OUTPATIENT
Start: 2024-10-31 | End: 2024-11-01 | Stop reason: HOSPADM

## 2024-10-31 RX ORDER — INDOCYANINE GREEN AND WATER 25 MG
KIT INJECTION AS NEEDED
Status: DISCONTINUED | OUTPATIENT
Start: 2024-10-31 | End: 2024-10-31 | Stop reason: SURG

## 2024-10-31 RX ORDER — FAMOTIDINE 10 MG/ML
20 INJECTION, SOLUTION INTRAVENOUS ONCE
Status: COMPLETED | OUTPATIENT
Start: 2024-10-31 | End: 2024-10-31

## 2024-10-31 RX ORDER — FENTANYL CITRATE 50 UG/ML
50 INJECTION, SOLUTION INTRAMUSCULAR; INTRAVENOUS
Status: DISCONTINUED | OUTPATIENT
Start: 2024-10-31 | End: 2024-10-31 | Stop reason: HOSPADM

## 2024-10-31 RX ORDER — SCOLOPAMINE TRANSDERMAL SYSTEM 1 MG/1
1 PATCH, EXTENDED RELEASE TRANSDERMAL ONCE
Status: DISCONTINUED | OUTPATIENT
Start: 2024-10-31 | End: 2024-10-31

## 2024-10-31 RX ORDER — DEXMEDETOMIDINE HYDROCHLORIDE 4 UG/ML
INJECTION, SOLUTION INTRAVENOUS CONTINUOUS PRN
Status: DISCONTINUED | OUTPATIENT
Start: 2024-10-31 | End: 2024-10-31 | Stop reason: SURG

## 2024-10-31 RX ORDER — MIDAZOLAM HYDROCHLORIDE 1 MG/ML
1 INJECTION, SOLUTION INTRAMUSCULAR; INTRAVENOUS
Status: DISCONTINUED | OUTPATIENT
Start: 2024-10-31 | End: 2024-10-31 | Stop reason: HOSPADM

## 2024-10-31 RX ORDER — FENTANYL CITRATE 50 UG/ML
50 INJECTION, SOLUTION INTRAMUSCULAR; INTRAVENOUS ONCE AS NEEDED
Status: DISCONTINUED | OUTPATIENT
Start: 2024-10-31 | End: 2024-10-31 | Stop reason: HOSPADM

## 2024-10-31 RX ORDER — DEXAMETHASONE SODIUM PHOSPHATE 4 MG/ML
INJECTION, SOLUTION INTRA-ARTICULAR; INTRALESIONAL; INTRAMUSCULAR; INTRAVENOUS; SOFT TISSUE AS NEEDED
Status: DISCONTINUED | OUTPATIENT
Start: 2024-10-31 | End: 2024-10-31 | Stop reason: SURG

## 2024-10-31 RX ORDER — FLUMAZENIL 0.1 MG/ML
0.2 INJECTION INTRAVENOUS AS NEEDED
Status: DISCONTINUED | OUTPATIENT
Start: 2024-10-31 | End: 2024-10-31 | Stop reason: HOSPADM

## 2024-10-31 RX ORDER — HYDROMORPHONE HYDROCHLORIDE 1 MG/ML
0.5 INJECTION, SOLUTION INTRAMUSCULAR; INTRAVENOUS; SUBCUTANEOUS
Status: DISCONTINUED | OUTPATIENT
Start: 2024-10-31 | End: 2024-10-31 | Stop reason: HOSPADM

## 2024-10-31 RX ORDER — HYDROCODONE BITARTRATE AND ACETAMINOPHEN 5; 325 MG/1; MG/1
1 TABLET ORAL ONCE AS NEEDED
Status: COMPLETED | OUTPATIENT
Start: 2024-10-31 | End: 2024-10-31

## 2024-10-31 RX ORDER — DIAZEPAM 5 MG/1
10 TABLET ORAL ONCE
Status: COMPLETED | OUTPATIENT
Start: 2024-10-31 | End: 2024-10-31

## 2024-10-31 RX ORDER — CEFAZOLIN SODIUM 500 MG/2.2ML
INJECTION, POWDER, FOR SOLUTION INTRAMUSCULAR; INTRAVENOUS AS NEEDED
Status: DISCONTINUED | OUTPATIENT
Start: 2024-10-31 | End: 2024-10-31 | Stop reason: SURG

## 2024-10-31 RX ORDER — SODIUM CHLORIDE 0.9 % (FLUSH) 0.9 %
3 SYRINGE (ML) INJECTION EVERY 12 HOURS SCHEDULED
Status: DISCONTINUED | OUTPATIENT
Start: 2024-10-31 | End: 2024-10-31 | Stop reason: HOSPADM

## 2024-10-31 RX ORDER — ROCURONIUM BROMIDE 10 MG/ML
INJECTION, SOLUTION INTRAVENOUS AS NEEDED
Status: DISCONTINUED | OUTPATIENT
Start: 2024-10-31 | End: 2024-10-31 | Stop reason: SURG

## 2024-10-31 RX ORDER — DIPHENHYDRAMINE HYDROCHLORIDE 50 MG/ML
12.5 INJECTION INTRAMUSCULAR; INTRAVENOUS
Status: DISCONTINUED | OUTPATIENT
Start: 2024-10-31 | End: 2024-10-31 | Stop reason: HOSPADM

## 2024-10-31 RX ORDER — CELECOXIB 200 MG/1
200 CAPSULE ORAL EVERY 12 HOURS SCHEDULED
Status: DISCONTINUED | OUTPATIENT
Start: 2024-10-31 | End: 2024-10-31 | Stop reason: SDUPTHER

## 2024-10-31 RX ORDER — OXYCODONE AND ACETAMINOPHEN 7.5; 325 MG/1; MG/1
1 TABLET ORAL EVERY 4 HOURS PRN
Status: DISCONTINUED | OUTPATIENT
Start: 2024-10-31 | End: 2024-10-31 | Stop reason: HOSPADM

## 2024-10-31 RX ORDER — PROMETHAZINE HYDROCHLORIDE 25 MG/1
25 SUPPOSITORY RECTAL ONCE AS NEEDED
Status: DISCONTINUED | OUTPATIENT
Start: 2024-10-31 | End: 2024-10-31 | Stop reason: HOSPADM

## 2024-10-31 RX ORDER — PROMETHAZINE HYDROCHLORIDE 25 MG/1
25 TABLET ORAL ONCE AS NEEDED
Status: DISCONTINUED | OUTPATIENT
Start: 2024-10-31 | End: 2024-10-31 | Stop reason: HOSPADM

## 2024-10-31 RX ORDER — LIDOCAINE HYDROCHLORIDE 10 MG/ML
0.5 INJECTION, SOLUTION INFILTRATION; PERINEURAL ONCE AS NEEDED
Status: COMPLETED | OUTPATIENT
Start: 2024-10-31 | End: 2024-10-31

## 2024-10-31 RX ORDER — ACETAMINOPHEN 500 MG
1000 TABLET ORAL EVERY 8 HOURS
Status: DISCONTINUED | OUTPATIENT
Start: 2024-10-31 | End: 2024-10-31 | Stop reason: SDUPTHER

## 2024-10-31 RX ORDER — SODIUM CHLORIDE, SODIUM LACTATE, POTASSIUM CHLORIDE, CALCIUM CHLORIDE 600; 310; 30; 20 MG/100ML; MG/100ML; MG/100ML; MG/100ML
9 INJECTION, SOLUTION INTRAVENOUS CONTINUOUS
Status: ACTIVE | OUTPATIENT
Start: 2024-10-31 | End: 2024-11-01

## 2024-10-31 RX ORDER — ACETAMINOPHEN 500 MG
1000 TABLET ORAL EVERY 8 HOURS
Status: DISCONTINUED | OUTPATIENT
Start: 2024-10-31 | End: 2024-11-01 | Stop reason: HOSPADM

## 2024-10-31 RX ORDER — ONDANSETRON 2 MG/ML
INJECTION INTRAMUSCULAR; INTRAVENOUS AS NEEDED
Status: DISCONTINUED | OUTPATIENT
Start: 2024-10-31 | End: 2024-10-31 | Stop reason: SURG

## 2024-10-31 RX ADMIN — Medication 10 MG: at 11:57

## 2024-10-31 RX ADMIN — PROPOFOL 200 MG: 10 INJECTION, EMULSION INTRAVENOUS at 08:58

## 2024-10-31 RX ADMIN — DEXMEDETOMIDINE HYDROCHLORIDE 0.3 MCG/KG/HR: 4 INJECTION, SOLUTION INTRAVENOUS at 09:15

## 2024-10-31 RX ADMIN — PHENYLEPHRINE HYDROCHLORIDE 0.3 MCG/KG/MIN: 10 INJECTION, SOLUTION INTRAVENOUS at 10:18

## 2024-10-31 RX ADMIN — LIDOCAINE 4% 1 APPLICATION: 4 CREAM TOPICAL at 06:31

## 2024-10-31 RX ADMIN — ROCURONIUM BROMIDE 10 MG: 10 INJECTION, SOLUTION INTRAVENOUS at 11:00

## 2024-10-31 RX ADMIN — DIAZEPAM 10 MG: 5 TABLET ORAL at 06:24

## 2024-10-31 RX ADMIN — TILMANOCEPT 1 DOSE: KIT at 07:43

## 2024-10-31 RX ADMIN — MAGNESIUM SULFATE HEPTAHYDRATE 2 G: 500 INJECTION, SOLUTION INTRAMUSCULAR; INTRAVENOUS at 09:15

## 2024-10-31 RX ADMIN — ROSUVASTATIN CALCIUM 20 MG: 20 TABLET, FILM COATED ORAL at 17:57

## 2024-10-31 RX ADMIN — ROCURONIUM BROMIDE 10 MG: 10 INJECTION, SOLUTION INTRAVENOUS at 10:00

## 2024-10-31 RX ADMIN — HYDROMORPHONE HYDROCHLORIDE 0.5 MG: 1 INJECTION, SOLUTION INTRAMUSCULAR; INTRAVENOUS; SUBCUTANEOUS at 15:42

## 2024-10-31 RX ADMIN — PROPOFOL 180 MCG/KG/MIN: 10 INJECTION, EMULSION INTRAVENOUS at 09:02

## 2024-10-31 RX ADMIN — FAMOTIDINE 20 MG: 10 INJECTION INTRAVENOUS at 08:09

## 2024-10-31 RX ADMIN — IBUPROFEN 800 MG: 800 TABLET, FILM COATED ORAL at 17:57

## 2024-10-31 RX ADMIN — HYDROCODONE BITARTRATE AND ACETAMINOPHEN 1 TABLET: 5; 325 TABLET ORAL at 15:38

## 2024-10-31 RX ADMIN — Medication 7.5 MG: at 13:23

## 2024-10-31 RX ADMIN — LIDOCAINE HYDROCHLORIDE 0.5 ML: 10 INJECTION, SOLUTION INFILTRATION; PERINEURAL at 06:40

## 2024-10-31 RX ADMIN — Medication 10 MG: at 10:00

## 2024-10-31 RX ADMIN — Medication 7.5 MG: at 11:05

## 2024-10-31 RX ADMIN — SUGAMMADEX 200 MG: 100 INJECTION, SOLUTION INTRAVENOUS at 13:43

## 2024-10-31 RX ADMIN — Medication 20 MG: at 09:15

## 2024-10-31 RX ADMIN — GABAPENTIN 300 MG: 300 CAPSULE ORAL at 17:57

## 2024-10-31 RX ADMIN — ACETAMINOPHEN 1000 MG: 500 TABLET ORAL at 17:57

## 2024-10-31 RX ADMIN — DEXAMETHASONE SODIUM PHOSPHATE 8 MG: 4 INJECTION, SOLUTION INTRAMUSCULAR; INTRAVENOUS at 09:05

## 2024-10-31 RX ADMIN — CEFAZOLIN 2 G: 225 INJECTION, POWDER, FOR SOLUTION INTRAMUSCULAR; INTRAVENOUS at 12:40

## 2024-10-31 RX ADMIN — Medication 10 MG: at 11:00

## 2024-10-31 RX ADMIN — LIDOCAINE HYDROCHLORIDE 80 MG: 20 INJECTION, SOLUTION INFILTRATION; PERINEURAL at 08:58

## 2024-10-31 RX ADMIN — SODIUM CHLORIDE 2000 MG: 900 INJECTION INTRAVENOUS at 08:40

## 2024-10-31 RX ADMIN — ONDANSETRON 4 MG: 2 INJECTION INTRAMUSCULAR; INTRAVENOUS at 13:26

## 2024-10-31 RX ADMIN — GLYCOPYRROLATE 0.2 MG: 0.2 INJECTION INTRAMUSCULAR; INTRAVENOUS at 09:11

## 2024-10-31 RX ADMIN — SCOPALAMINE 1 PATCH: 1 PATCH, EXTENDED RELEASE TRANSDERMAL at 08:09

## 2024-10-31 RX ADMIN — ROCURONIUM BROMIDE 60 MG: 10 INJECTION, SOLUTION INTRAVENOUS at 08:58

## 2024-10-31 RX ADMIN — ROCURONIUM BROMIDE 10 MG: 10 INJECTION, SOLUTION INTRAVENOUS at 11:57

## 2024-10-31 RX ADMIN — SODIUM CHLORIDE, SODIUM LACTATE, POTASSIUM CHLORIDE, CALCIUM CHLORIDE 9 ML/HR: 20; 30; 600; 310 INJECTION, SOLUTION INTRAVENOUS at 06:40

## 2024-10-31 NOTE — H&P
General Surgery Breast Cancer History and Physical Exam      Summary:    Fátima Julien is a 64 y.o. 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       A multidisciplinary plan has been formulated for the patient:    (1) Breast Surgical Oncology:  -Inv[facility]ae 9 panel genetic testing: negative.   -Surgical plan: Plan for bilateral breast mastectomy with left SLN biopsy, possible left axillary dissection.   -Plastic surgery referral with Dr. Mayorga.     (2) Medical Oncology:  -Will refer postoperatively for evaluation for endocrine therapy and possible need for chemotherapy.     (3) Radiation Oncology:  -Will refer postoperatively for evaluation for radiation therapy as clinically indicated.     Referring Provider: No ref. provider found     Chief Complaint: abnormal breast imaging     History of Present Illness: Ms. Fátima Julien is a 64 y.o. year old lady, seen at the request of No ref. provider found for a new diagnosis of left breast cancer.       This was initially detected as an imaging abnormality. Her last mammogram was 3 years ago, prior to that she was annual. She has had prior bilateral breast biopsy, which all returned as benign. Her work-up is detailed in the oncologic history below.      She denies any breast lumps, pain, skin changes, or nipple discharge. She has a family history of breast cancer in a maternal aunt (age mid to late 30's; contralateral metastatic breast cancer in her 70's). She denies any family history of ovarian cancer.      Workup of Current Diagnosis:    2/20/2024 bilateral Screening Mammogram:  There are scattered areas of fibroglandular density.  Finding 1: There is a focal asymmetry measuring 15 mm with indistinct margins in the anterior one third of the left breast at 9:00 subareolar.  Suspicious.  Requires additional evaluation.  Finding 2: There is a focal asymmetry measuring 22  mm in the one third region of the left breast at 12-1 o'clock.  Requires additional evaluation.  BI-RADS Category 0     8/12/2024 left breast diagnostic Mammogram with Ultrasound:   There are scattered areas of fibroglandular density.  Finding 1: On present exam there is a new isodense irregular mass measuring 15 mm with indistinct margins in the anterior one third of the left breast at 9:00 areolar edge.  Suspicious.  Ultrasound-guided biopsy is recommended.  Finding 2: Additional evaluation was performed for focal asymmetry in the left breast at 12-1 o'clock seen on screening mammogram.  This completely resolves into benign fibroglandular breast tissue.  No sonographic correlate.  Benign.  BI-RADS Category 4C     8/28/2024 left breast ultrasound-guided biopsy:   Left breast at 9:00 was imaged and the mass was localized.  12 core specimens were obtained.  A 2 marked vision globe tissue marker was placed.  Clip was in the expected position.  Pathology returned as invasive ductal carcinoma with DCIS which is malignant and concordant.     8/28/2024 Pathology:   Final Diagnosis   1.  Breast, left 9 o'clock position AR edge, core biopsy: (Vision clip)  A.  Invasive mammary carcinoma, no special type (ductal), Perry histologic grade 3 (tubules = 3, nuclei = 3, mitoses = 2), measuring 9 mm maximally, and involving approximately 9 core fragments.  B.  Minimal associated ductal carcinoma in situ (DCIS) high nuclear grade with solid and cribriform architecture and spotty single cell necrosis.  C.  No definitive lymphovascular space invasion identified.      9/16/2024 Bilateral Breast MRI:    IMPRESSION AND RECOMMENDATION:  1.  A 1.6 cm irregular enhancing mass in the anterior left breast represents biopsy-proven malignancy. Recommend surgical management.   2.  Non-mass enhancement measuring 2.3 cm at 8-9 o'clock in the posterior right breast and non-mass enhancement measuring 1.8 cm in the posterior retroareolar left  breast are suspicious. Recommend further  evaluation with bilateral MRI guided core needle biopsy.  An epic in basket message sent to Dr. Edouard on 2024.  BI-RADS Category 4: Suspicious     10/18/2024 Right Breast MRI Guided Biopsy:   IMPRESSION/RECOMMENDATIONS:  1. MRI guided biopsy of 2.3 cm non-mass enhancement at 9:00 in the posterior right breast, marked by a buckle clip. Pathology is benign and concordant with the imaging assessment.  2. Surgical management is again recommended for biopsy-proven left breast malignancy.  Final Diagnosis   1.  Breast, right, 9:00, MRI, biopsy (buckle shaped clip): Benign breast tissue with               -A.  Fibroadenomatoid hyperplasia               -B.  Pseudoangiomatous stromal hyperplasia of the mammary stroma               -C.  Microcalcifications in nonneoplastic tissue               -D.  Florid ductal hyperplasia of the usual type               -E.  Columnar cell change     Gynecologic History:   . P:1 AB:1  Age at first childbirth: 22  Lactation/How long: yes  Age at menarche: 17  Age at menopause: 53  Total years of oral contraceptive use: 8 to 10 years previously  Total years of hormone replacement therapy: On estradiol cream     Past Medical History:   HLD  Hypothyroidism      Past Surgical History:    L carpal tunnel release  Hysterectomy   Right knee arthroscopy   Partial thyroidectomy  Colonoscopy due next year, had one prior      Family History:    As above     Social History:  Denies tobacco use  Occasional alcohol use     Allergies:   Allergies         Allergies   Allergen Reactions    Penicillin G Nausea And Vomiting       Beta lactam allergy details  Antibiotic reaction:  (n/v)  Age at reaction: adult  Dose to reaction time: unknown  Reason for antibiotic: unknown  Epinephrine required for reaction?: unknown  Tolerated antibiotics: unknown       Tetanus Toxoid Hives         Medications:     Current Medications      Current Outpatient Medications:      levothyroxine (SYNTHROID, LEVOTHROID) 25 MCG tablet, Take 1 tablet by mouth., Disp: , Rfl:     multivitamin (MULTIVITAMIN PO), Take  by mouth Daily., Disp: , Rfl:     rosuvastatin (CRESTOR) 20 MG tablet, Take 1 tablet by mouth Daily., Disp: , Rfl:     VITAMIN D PO, Take 1 tablet by mouth Daily., Disp: , Rfl:         Laboratory Values:    Labs from 10/21/2024 reviewed by me      Review of Systems:   Influenza-like illness: no fever, no  cough, no  sore throat, no  body aches, no loss of sense of taste or smell, no known exposure to person with Covid-19.  Constitutional: Negative for fevers or chills  HENT: Negative for hearing loss or runny nose  Eyes: Negative for vision changes or scleral icterus  Respiratory: Negative for cough or shortness of breath  Cardiovascular: Negative for chest pain or heart palpitations  Gastrointestinal: Negative for abdominal pain, nausea, vomiting, constipation, melena, or hematochezia  Genitourinary: Negative for hematuria or dysuria  Musculoskeletal: Negative for joint swelling or gait instability  Neurologic: Negative for tremors or seizures  Psychiatric: Negative for suicidal ideations or depression  All other systems reviewed and negative     Physical Exam:   ECO - Asymptomatic  Constitutional: Well-developed well-nourished, no acute distress  Eyes: Conjunctiva normal, sclera nonicteric  ENMT: Hearing grossly normal, oral mucosa moist  Neck: Supple, no palpable mass, trachea midline  Respiratory: Clear to auscultation, normal inspiratory effort  Cardiovascular: Regular rate, no peripheral edema, no jugular venous distention  Breast: symmetric  Right: No visible abnormalities on inspection while seated, with arms raised or hands on hips. No masses, skin changes, or nipple abnormalities.  Left: No visible abnormalities on inspection while seated, with arms raised or hands on hips. No masses, skin changes, or nipple abnormalities.  No clinical chest wall  involvement.  Gastrointestinal: Soft, nontender  Lymphatics (palpable nodes): No cervical, supraclavicular or axillary lymphadenopathy  Skin:  Warm, dry, no rash on visualized skin surfaces  Musculoskeletal: Symmetric strength, normal gait  Psychiatric: Alert and oriented ×3, normal affect      Discussion:  I had an extensive discussion with the patient and her family about the nature of her breast cancer diagnosis. We reviewed the components of breast tissue including ducts and lobules. We reviewed her pathology report in detail. We reviewed breast cancer histology, including stage, grade, ER/LA receptors, HER2 receptors and how this applies to her diagnosis. We reviewed the basics of systemic and local/regional management of breast cancer.      We discussed that most breast cancer is not hereditary, however given her family history, this may play a role in her case. I believe genetic testing is warranted and could affect surgical decision making.      We reviewed potential surgical treatments to include partial mastectomy, mastectomy, sentinel lymph node biopsy and axillary node dissection and discussed the rationale associated with each approach. Regarding radiation therapy, we discussed that radiation is indicated in all cases of breast conservation and in only limited circumstances following mastectomy. We discussed that the primary goal of adjuvant radiation is to decrease the likelihood of local recurrence.      In her case, she is a good candidate for lumpectomy and she would like to proceed with breast conservation. We discussed that lumpectomy would require preoperative wire-localization. We also discussed the risk of positive margins and that she must have negative margins for lumpectomy to be an appropriate oncologic procedure. I will make every effort to obtain negative margins at her initial operation, but there is a 10-15% chance that she will require a second operation for re-excision, or possibly a  total mastectomy. We will not know the margin status until after her final pathology has returned.      We discussed axillary staging. I described the procedure for sentinel lymph node biopsy in detail, including the preoperative injection of technetium sulfur colloid and intraoperative injection of lymphazurin blue dye. I explained that this is a mapping test and not a cancer test, that all of the lymph nodes containing these dyes will be removed for complete testing by pathology, and that the results could impact the decision for adjuvant treatment or additional surgery.     I described additional risks and potential complications associated with surgery, including, but not limited to, bleeding, infection, complications related to blue dye, lymphedema, deformity/poor cosmetic result, chronic pain, neurovascular injury, numbness, seroma, hematoma, deep venous thrombosis, skin flap necrosis, disease recurrence and the possibility of requiring additional surgery. We also discussed other treatment options including the option of not undergoing any surgical treatment and the risks associated with this including disease progression. She expressed an understanding of these factors and wished to proceed.     We discussed that in her case, systemic treatment would involve endocrine therapy and possibly chemotherapy. She will be referred to medical oncology postoperatively to discuss this further.      EUGENE ROBLES M.D.  General and Endoscopic Surgery  [facility]     [address], [address]  [place], [place], [address]  P: [phone]  F: [phone]

## 2024-10-31 NOTE — PLAN OF CARE
Goal Outcome Evaluation:  Plan of Care Reviewed With: patient        Progress: improving  Outcome Evaluation: Received pt from PACU, A/O, on room air, DTV, dressing to chest is CDI, JAMES's X 2 w/ bloody output, tolerating regular diet, using I/S, SCD's in place, went over drain care teaching, pain well-controlled w/ scheduled pain meds, saline locked

## 2024-10-31 NOTE — ANESTHESIA PREPROCEDURE EVALUATION
Anesthesia Evaluation     Patient summary reviewed and Nursing notes reviewed   history of anesthetic complications:  PONV  NPO Solid Status: > 8 hours  NPO Liquid Status: > 2 hours           Airway   Mallampati: II  TM distance: >3 FB  Neck ROM: full  Dental      Pulmonary    Cardiovascular         Neuro/Psych  GI/Hepatic/Renal/Endo    (+) thyroid problem hypothyroidism    Musculoskeletal     Abdominal    Substance History      OB/GYN          Other                          Anesthesia Plan    ASA 2     general   total IV anesthesia  intravenous induction     Anesthetic plan, risks, benefits, and alternatives have been provided, discussed and informed consent has been obtained with: patient.        CODE STATUS:

## 2024-10-31 NOTE — ANESTHESIA POSTPROCEDURE EVALUATION
Patient: Fátima Julien    Procedure Summary       Date: 10/31/24 Room / Location:  [facility]  [facility]    Anesthesia Start: 0847 Anesthesia Stop: 1405    Procedures:       bilateral skin sparing mastectomy, left sentinel lymph node biopsy (Left: Breast)      BILATERAL GOLDILOCKS RECONSTRUCTION BILATERAL  WITH TISSUE EXPANDER PLACEMENT (Bilateral: Breast) Diagnosis:       Malignant neoplasm of female breast, unspecified estrogen receptor status, unspecified laterality, unspecified site of breast      (Malignant neoplasm of female breast, unspecified estrogen receptor status, unspecified laterality, unspecified site of breast [C50.919])    Surgeons: Stacy Edouard MD; Adan Mayorga MD Provider: Albina Richter MD    Anesthesia Type: general ASA Status: 2            Anesthesia Type: general    Vitals  Vitals Value Taken Time   BP 97/54 10/31/24 1445   Temp 37.2 °C (99 °F) 10/31/24 1405   Pulse 74 10/31/24 1457   Resp 18 10/31/24 1415   SpO2 99 % 10/31/24 1457   Vitals shown include unfiled device data.        Anesthesia Post Evaluation

## 2024-10-31 NOTE — OP NOTE
Operative Report  Date of surgery: 10/31/2024      PREOPERATIVE DIAGNOSIS: left breast cancer  POSTOPERATIVE DIAGNOSIS: Same as preop      OPERATION PERFORMED:   Immediate breast reconstruction with Goldilocks reconstruction  Tissue expander placement, pre-pectoral   Intraoperative SPY angiography    ANESTHESIA: General, local with Exparel (20 cc Exparel mixed with quarter percent Marcaine plain for a total of 40 cc).  All 40 cc was administered      SURGEON: Adan Mayorga MD       Indications for procedure:  Fátima Julien is a 64 y.o. female with left breast DCIS seen by Dr. Edouard for her breast oncology and plan for bilateral skin sparing mastectomy with SNL biopsy. Based on her preoperative exam and her health history, decision was made to proceed with goldilocks mastectomy and tissue expander placement for her reconstruction. The risks, benefits and alternatives of the procedure were discussed in detail with the patient and she elected to proceed. She was marked in the preop area in the upright and standing position.      Procedure narrative:   Patient underwent injection for sentinel node biopsy then was taken to the OR and underwent bilateral mastectomy and left sentinel lymph node biopsy by Dr. Edouard.  She began on the right breast and I began on the left with de-epithelialization of the left dermal pedicle.  Once she had completed her portion on the right a then began reconstruction of the right breast while she completed the mastectomy and sentinel lymph node biopsy on the left.       Upon examination of the breast pocket, good hemostasis was confirmed. The mastectomy flaps were pink and viable overall with no real areas of concern and overall robust and viable. I measured the defect to ensure the correct size tissue expander was utilized. 13cm base width was confirmed. I selected the Natrelle, Moderate height, variable projection smooth tissue expander, 400cc volume.      I recreated the lateral  gutter and IMF had been partially obliterated, I closed down the lateral gutter with 2-0 PDS to recreate lateral breast fold and obliterate dead space between breast and axillary dissection. Next I injected Exparel mixture as a field block into pectoralis, serratus, and mastectomy flaps for postoperative pain control. (Exparel mixture consisted of 20cc Exparel, 20cc 0.25% marcaine plain for a total of 40cc) 20cc of this mixture was infiltrated into each operated breast. I placed a 15F kady drain in lateral axilla and breast cavity and secured in place with 2-0 silk suture. I then performed irrigation with double antibiotic solution containing Kefzol and gent followed by dilute Betadine and obtained hemostasis and set a moist lap pad in the breast cavity while I de-epithelialized the inferior breast flap to used as a dermal pedicle for additional coverage and support over the tissue expander. This was performed with a #10 scalpel then hemostasis obtained with bovie electrocautery.     I then turned my attention to the back table to prepare the tissue expander. I changed my gloves prior to handling the TE.  The expander was filled with 100 cc of sterile injectable saline with a few drops of methylene blue.  The tissue expander was bathed in Betadine and double antibiotic solution.  I then prepared the tissue expander for the contralateral left side on the back table as well.    Prior to placing the expander in the cavity, I performed SPY angiography of the skin flaps and the dermal flap to ensure viability.  I then performed copious irrigation of the pocket with dilute betadine solution followed by double antibiotic solution with Kefzol and gentamicin.    The breast skin was then cleansed with betadine lap and my gloves were changed once again. The expander was then carefully placed into the chest cavity and secured into place using tabs at 12 o'clock, 6 o'clock and 4o'clock and 2 o'clock positions to prevent  rotation. I did place the expander in the pre-pectoral plane. I again confirmed hemostasis then proceeded with closure in layers.  Incisions were closed with 3-0 monocryl in deep dermis followed by 3-0 stratafix running subcuticular suture.     I then performed an identical procedure on the left side securing the tissue expander in place at 12 o'clock, 6 o'clock and 8 o'clock and 10 o'clock positions to prevent rotation.  I then proceeded with closure in layers as above.    Once both tissue expanders had been placed and determined that the mastectomy flaps would close easily without tension, I performed one final SPY angiography of the breast flaps to ensure viability at the conclusion. I then performed an additional 100 cc to each expander for a total of 200 cc per side.    Breasts were cleansed and dried and dressings applied.  Exofin fusion was applied to incision, followed by fluffed kerlex over the incisions as well as an unrolled Kerlix to each axilla and her chest was carefully wrapped with an Ace wrap an ACE. Biopatch and tegaderm to each drain site.     Patient tolerated the procedure well and was taken to PACU in stable condition.     Implant information:   Left: Tissue expander: Natrelle 133S-MV-T (moderate height, variable projection) REF: 629S-NN-12-T; 400cc; SN: [id number]  Right: Tissue expander: Natrelle 133S-MV-T (moderate height, variable projection) REF: [id number]-QG-09-T; 400cc; SN: [id number]     Complications: none     Specimens: None for my portion of the case    mastectomy weights including the de-epithelialized skin:  Left: 721 g  Right: 710 g     Drains: 15F Adan drain each breast/ axilla

## 2024-10-31 NOTE — ANESTHESIA POSTPROCEDURE EVALUATION
Patient: Fátima Julien    Procedure Summary       Date: 10/31/24 Room / Location:  [facility]    Anesthesia Start: 0847 Anesthesia Stop: 1405    Procedures:       bilateral skin sparing mastectomy, left sentinel lymph node biopsy (Left: Breast)      BILATERAL GOLDILOCKS RECONSTRUCTION BILATERAL  WITH TISSUE EXPANDER PLACEMENT (Bilateral: Breast) Diagnosis:       Malignant neoplasm of female breast, unspecified estrogen receptor status, unspecified laterality, unspecified site of breast      (Malignant neoplasm of female breast, unspecified estrogen receptor status, unspecified laterality, unspecified site of breast [C50.919])    Surgeons: Stacy Edouard MD; Adan Mayorga MD Provider: Albina Richter MD    Anesthesia Type: general ASA Status: 2            Anesthesia Type: general    Vitals  Vitals Value Taken Time   BP 97/54 10/31/24 1445   Temp 37.2 °C (99 °F) 10/31/24 1405   Pulse 74 10/31/24 1457   Resp 18 10/31/24 1415   SpO2 99 % 10/31/24 1457   Vitals shown include unfiled device data.        Post Anesthesia Care and Evaluation    Patient location during evaluation: bedside  Patient participation: complete - patient participated  Level of consciousness: awake  Pain management: adequate    Airway patency: patent  Anesthetic complications: No anesthetic complications  PONV Status: controlled  Cardiovascular status: acceptable  Respiratory status: acceptable  Hydration status: acceptable    Comments: BP 97/56   Pulse 79   Temp 37.2 °C (99 °F) (Oral)   Resp 18   SpO2 98%

## 2024-10-31 NOTE — ANESTHESIA PROCEDURE NOTES
Airway  Urgency: elective    Date/Time: 10/31/2024 9:01 AM  Airway not difficult    General Information and Staff    Patient location during procedure: OR  Anesthesiologist: Albina Richter MD  CRNA/CAA: Shannan Moon CRNA    Indications and Patient Condition  Indications for airway management: airway protection    Preoxygenated: yes  MILS not maintained throughout  Mask difficulty assessment: 1 - vent by mask    Final Airway Details  Final airway type: endotracheal airway      Successful airway: ETT  Cuffed: yes   Successful intubation technique: direct laryngoscopy  Facilitating devices/methods: intubating stylet  Endotracheal tube insertion site: oral  Blade: Goodman  Blade size: 2  ETT size (mm): 7.0  Cormack-Lehane Classification: grade I - full view of glottis  Placement verified by: chest auscultation and capnometry   Cuff volume (mL): 6  Measured from: teeth  ETT/EBT  to teeth (cm): 20  Number of attempts at approach: 1  Assessment: lips, teeth, and gum same as pre-op and atraumatic intubation    Additional Comments  Airway exam prior to DL, teeth/lips inspected. Preoxygenated with 100% O2; sniffing position, IV induction, eyes taped. Easy mask ventilation. Atraumatic intubation. ETT connected to vent. Confirmed EBBS, +EtCO2. Lips and teeth inspected, intact, no damage.

## 2024-10-31 NOTE — OP NOTE
OPERATIVE REPORT     DATE: 10/31/2024     SURGEON: Stacy Edouard MD      ASSISTANT: Josue Ibarra, who was present for necessary suctioning, retracting, suturing throughout the procedure      OPERATION PERFORMED: bilateral breast skin spaing mastectomy with sentinel lymph node biopsy      PREOPERATIVE DIAGNOSIS: invasive mammary carcinoma of the left breast      POSTOPERATIVE DIAGNOSIS: Same     ANESTHESIA: general     SPECIMEN:   Right breast (stitch at 12:00)  Left breast (stitch at 12:00)    Left axillary sentinel lymph node #1  Left axillary sentinel lymph node #2   Left axillary sentinel lymph node #3     DRAINS: None     BLOOD LOSS: Minimal     INDICATION FOR OPERATION: Fátima Julien is a 64 y.o. lady who was recently diagnosed with left breast invasive mammary carcinoma. She elected to proceed with bilateral breast skin sparing mastectomy with left sentinel lymph node biopsy was recommended.. All risks (including bleeding, infection, damage to surrounding structures, need for further surgery, positive margins ), benefits and alternatives were explained to the patient who agreed and wished to proceed. Informed consent was signed.      OPERATIVE COURSE: The patient was taken to the operating room, transferred onto the operating room table, and underwent anesthesia without incident. 5 cc of blue dye was injected into the dermal layer of the left nipple areolar complex. The patient was prepped and draped in sterile fashion. A time out was performed and preoperative antibiotics were given.    We began with the right side.  An incision was drawn around the nipple areolar complex as decided by Dr. Mayorga..A scalpel was used to transect the skin and dermal layer. Subcutaneous flaps were created approximately 1cm in thickness circumferentially. These were extended to the clavicle superiorly, the sternum medially, the inframammary fold inferiorly, and the serratus laterally. Once this was circumferentially  dissected free with Bovie electrocautery, the breast was removed from the chest wall at the level of the pectoralis fascia. It was marked as listed above and passed off for fresh permanent specimen. The area was irrigated and appeared hemostatic. Bovie electrocautery was used for any small muscle bleeding.     We then performed a left mastectomy.  An incision was drawn around the nipple areolar complex as decided by Dr. Mayorga..A scalpel was used to transect the skin and dermal layer. Subcutaneous flaps were created approximately 1cm in thickness circumferentially. These were extended to the clavicle superiorly, the sternum medially, the inframammary fold inferiorly, and the serratus laterally. Once this was circumferentially dissected free with Bovie electrocautery, the breast was removed from the chest wall at the level of the pectoralis fascia. It was marked as listed above and passed off for fresh permanent specimen. The area was irrigated and appeared hemostatic. Bovie electrocautery was used for any small muscle bleeding.     We then performed a sentinel lymph node biopsy. Bovie electrocautery was used to dissect down through the subcutaneous tissues and through the clavipectoral fascia into the axilla.  3 [place] lymph nodes were identified. These were removed with Bovie electrocautery and clips across all major lymphovascular structures. Once this was done, there were no hot, blue, or palpable lymph nodes remaining. The area was irrigated and appeared hemostatic.    Dr. Mayorga then performed the reconstruction.  .The patient tolerated the procedure well. All needle and lap counts were correct at the end of the case. The patient was then awoken from anesthesia and taken to recovery for further monitoring.     [place] Node Biopsy for Breast Cancer - Left  Operation performed with curative intent. Yes   Tracer(s) used to identify sentinel nodes in the upfront surgery (non-neoadjuvant) setting (select all that  apply). Dye and Radioactive tracer   Tracer(s) used to identify sentinel nodes in the neoadjuvant setting (select all that apply). N/A   All nodes (colored or non-colored) present at the end of a dye-filled lymphatic channel were removed. Yes   All significantly radioactive nodes were removed. Yes   All palpably suspicious nodes were removed. Yes   Biopsy-proven positive nodes marked with clips prior to chemotherapy were identified and removed. N/A      Stacy Edouard MD   General and Endoscopic Surgery  [facility], [facility]     [address], [address]  [place], [place], [address]  P: [phone]  F: [phone]

## 2024-10-31 NOTE — PERIOPERATIVE NURSING NOTE
PT. TOOK MEDS PER DR. RICHARDS INSTRUCTION THIS AM. STATES SHE TOOK TYLENOL, CELEBREX AND GABAPENTIN.

## 2024-10-31 NOTE — H&P
Plastic Surgery History and Physical Note    Current Date: 10/31/2024  Name: Fátima Julien  Age: 64 y.o.  Sex: female  MRN: [id number]  YOB: 1960    ?  Chief Complaint: Breast cancer  ?  History of Present Illness:  Fátima Julien is a female 64 y.o. here today for bilateral mastectomy with Dr. Edouard and immediate reconstruction with myself patient was previously seen in clinic and all options for reconstruction and questions were answered.  Risks benefits and alternatives of the procedure were discussed in detail with the patient.  She understands and elects proceed.       Family History:   Family History   Problem Relation Age of Onset    Breast cancer Maternal Aunt 40 - 49        diagnosed in her 40s and mid to late 70s    Malig Hyperthermia Neg Hx      Social History:   Social History     Socioeconomic History    Marital status:    Tobacco Use    Smoking status: Never    Smokeless tobacco: Never   Vaping Use    Vaping status: Never Used   Substance and Sexual Activity    Alcohol use: Yes     Alcohol/week: 1.0 standard drink of alcohol     Types: 1 Glasses of wine per week     Comment: occasionally    Drug use: Never    Sexual activity: Defer     Past Medical History:   Past Medical History:   Diagnosis Date    Breast cancer     LEFT BREAST    Hyperlipidemia     Hypotension     PONV (postoperative nausea and vomiting)     SEVERE    Syncope     Thyroid nodule     Ulcerative colitis      Past Surgical History:   Past Surgical History:   Procedure Laterality Date    BREAST BIOPSY Left 2024    BREAST LUMPECTOMY Bilateral     CARPAL TUNNEL RELEASE Left     COLONOSCOPY N/A 2014    [facility]    HX OVARIAN CYSTECTOMY      HYSTERECTOMY      KNEE ARTHROSCOPY Right 2022    THYROIDECTOMY, PARTIAL       Medications:   Current Facility-Administered Medications:     ceFAZolin 2000 mg IVPB in 100 mL NS (MBP), 2,000 mg, Intravenous, Once, Stacy Edouard MD, 2,000 mg at 10/31/24 0840     fentaNYL citrate (PF) (SUBLIMAZE) injection 50 mcg, 50 mcg, Intravenous, Once PRN, Deon Carlisle MD    lactated ringers infusion, 9 mL/hr, Intravenous, Continuous, Deon Carlisle MD, Last Rate: 9 mL/hr at 10/31/24 0640, 9 mL/hr at 10/31/24 0640    midazolam (VERSED) injection 1 mg, 1 mg, Intravenous, Q5 Min PRN, Deon Carlisle MD    scopolamine patch 1 mg/72 hr, 1 patch, Transdermal, Once, Deon Carlisle MD, 1 patch at 10/31/24 0809    sodium chloride 0.9 % flush 3 mL, 3 mL, Intravenous, Q12H, Deon Carlisle MD    sodium chloride 0.9 % flush 3-10 mL, 3-10 mL, Intravenous, PRN, Deon Carlisle MD  Allergies:   Allergies   Allergen Reactions    Tetanus Toxoid Hives    Penicillin G Nausea And Vomiting     Beta lactam allergy details  Antibiotic reaction:  (n/v)  Age at reaction: adult  Dose to reaction time: unknown  Reason for antibiotic: unknown  Epinephrine required for reaction?: unknown  Tolerated antibiotics: unknown   Beta lactam allergy details  Antibiotic reaction: rash  Age at reaction: adult  Dose to reaction time: unknown  Reason for antibiotic: unknown  Epinephrine required for reaction?: no  Tolerated antibiotics: amoxicillin, augmentin         ?  Review of Systems:   Constitutional: Negative for fevers/chills  H&N: Negative for sore throat/rhinorrhea.  Eyes: Negative for dry eye/visual disturbances.  Respiratory: Negative for cough or dyspnea.    Cardiovascular: Negative for cyanosis or chest pain.   Gastrointestinal: Negative for nausea/emesis.  Genitourinary: Negative for frequency/dysuria.   Musculoskeletal: Negative for muscle or joint pain.  Skin: Negative for new rash/lesions.  Endocrine: Negative for excessive thirst/feeling cold.  Hematology: Negative for easy bruising/bleeding.  Neurological: Negative for headaches/tinnitus.    Physical Examination:   /77 (BP Location: Right arm, Patient Position: Sitting)   Pulse 74   Temp 97.8 °F (36.6 °C) (Oral)   Resp 18   SpO2 98%  "  There is no height or weight on file to calculate BMI.     General: Well nourished, well developed, in no acute distress  Eyes: PERRLA, EOM intact, sclerae anicteric, no conjunctival injection  HENT: Normocephalic, atraumatic, mucous membranes moist  Neck: Supple, no thyromegaly, no lymphadenopathy, trachea midline  Respiratory: Negative for wheezing or stridor, non-labored respirations   Cardiovascular: Normal rate, negative for cyanosis, negative for peripheral edema  Abdominal: Soft, non-distended.  Musculoskeletal: Warm and dry extremities. Negative for motor deficits. Able to stand from seated position easily.  Psychiatric: Appropriate affect, cooperative demeanor.  Neurologic: Oriented x 3, negative for gross motor or sensory deficits.  Skin: No rashes or open wounds.  Breasts: Grade 3 ptosis bilaterally no lumps bumps or masses palpated she does have a biopsy site on the right breast which has significant ecchymosis on the lateral quadrant as well as the RAFA areolar area.      Labs:  Lab Results   Component Value Date    WBC 5.87 10/21/2024    HGB 13.9 10/21/2024    HCT 42.7 10/21/2024    MCV 88.8 10/21/2024     10/21/2024     Lab Results   Component Value Date    GLUCOSE 103 (H) 10/21/2024    BUN 15 10/21/2024    CREATININE 0.85 10/21/2024    BCR 17.6 10/21/2024    K 4.1 10/21/2024    CO2 24.4 10/21/2024    CALCIUM 9.6 10/21/2024     No results found for: \"INR\", \"PROTIME\"  No results found for: \"PTT\"  No results found for: \"HGBA1C\"  No results found for: \"ABORH\"      Recent Imaging:   No radiology results for the last day       Assessment and Plan:   Fátima Julien is a 64 y.o. female here today for bilateral mastectomy with immediate reconstruction with [place] reconstruction and tissue expander and ADM placement.  -All questions answered  -Consent signed  -ERAS protocol initiated in preop      Adan Mayorga MD  10/31/24  08:57 EDT           "

## 2024-11-01 VITALS
DIASTOLIC BLOOD PRESSURE: 52 MMHG | RESPIRATION RATE: 16 BRPM | HEART RATE: 72 BPM | OXYGEN SATURATION: 92 % | TEMPERATURE: 98.9 F | SYSTOLIC BLOOD PRESSURE: 91 MMHG

## 2024-11-01 PROCEDURE — G0378 HOSPITAL OBSERVATION PER HR: HCPCS

## 2024-11-01 RX ORDER — GABAPENTIN 300 MG/1
300 CAPSULE ORAL EVERY 8 HOURS SCHEDULED
Qty: 30 CAPSULE | Refills: 0 | Status: SHIPPED | OUTPATIENT
Start: 2024-11-01 | End: 2024-11-11

## 2024-11-01 RX ORDER — ACETAMINOPHEN 500 MG
1000 TABLET ORAL EVERY 8 HOURS
Qty: 60 TABLET | Refills: 0 | Status: SHIPPED | OUTPATIENT
Start: 2024-11-01 | End: 2024-11-11

## 2024-11-01 RX ORDER — IBUPROFEN 800 MG/1
800 TABLET, FILM COATED ORAL EVERY 8 HOURS
Qty: 30 TABLET | Refills: 0 | Status: SHIPPED | OUTPATIENT
Start: 2024-11-01 | End: 2024-11-11

## 2024-11-01 RX ORDER — OXYCODONE HYDROCHLORIDE 5 MG/1
5 TABLET ORAL EVERY 4 HOURS PRN
Qty: 12 TABLET | Refills: 0 | Status: SHIPPED | OUTPATIENT
Start: 2024-11-01

## 2024-11-01 RX ADMIN — IBUPROFEN 800 MG: 800 TABLET, FILM COATED ORAL at 10:24

## 2024-11-01 RX ADMIN — LEVOTHYROXINE SODIUM 25 MCG: 25 TABLET ORAL at 05:13

## 2024-11-01 RX ADMIN — OXYCODONE HYDROCHLORIDE 5 MG: 5 TABLET ORAL at 10:24

## 2024-11-01 RX ADMIN — ACETAMINOPHEN 1000 MG: 500 TABLET ORAL at 10:25

## 2024-11-01 RX ADMIN — Medication 10 ML: at 01:41

## 2024-11-01 RX ADMIN — GABAPENTIN 300 MG: 300 CAPSULE ORAL at 01:40

## 2024-11-01 RX ADMIN — ROSUVASTATIN CALCIUM 20 MG: 20 TABLET, FILM COATED ORAL at 08:39

## 2024-11-01 RX ADMIN — OXYCODONE HYDROCHLORIDE 5 MG: 5 TABLET ORAL at 05:13

## 2024-11-01 RX ADMIN — IBUPROFEN 800 MG: 800 TABLET, FILM COATED ORAL at 01:40

## 2024-11-01 RX ADMIN — ACETAMINOPHEN 1000 MG: 500 TABLET ORAL at 01:40

## 2024-11-01 RX ADMIN — Medication 10 ML: at 08:40

## 2024-11-01 NOTE — PLAN OF CARE
Goal Outcome Evaluation:  Plan of Care Reviewed With: patient           Outcome Evaluation: VSS, on RA, no c/o pain or nausea, walked in hallway, voiding dressing to benny breast CDI, Bello drains x2, bloody/serosang out put, tolerating reg diet, SCDs on when in bed, scheduled meds currently managing pain, using IS, saline locked, will continue to monitor,

## 2024-11-01 NOTE — DISCHARGE SUMMARY
DATE OF ADMISSION:  10/31/2024  DATE OF DISCHARGE:  11/1/2024    ATTENDING:        Stacy Edouard M.D.       GENERAL SURGERY    CONSULTS:    None    PRINCIPAL DIAGNOSIS:     breast cancer    DISCHARGE DIAGNOSES:     breast cancer    HOSPITAL PROCEDURES:     Bilateral breast skin sparing mastectomy with left sentinel lymph node biopsy     HOSPITAL COURSE:   The patient was admitted after the above listed elective procedure. She had an uneventful postoperative course. She was discharged on POD1 with no issues. She was tolerating a diet, her pain was controlled with PO pain medication, and she was ambulating without assistance.    ACTIVITY:  Okay to shower.  Ambulate and climb stairs as tolerated.  No lifting over 10 lbs for 2 weeks.  Okay to drive if not taking pain medications.    DIET:  Regular diet    FOLLOW UP:  With Dr Edouard in 3-4 weeks. Instructed to call [phone] for an appointment.

## 2024-11-01 NOTE — CASE MANAGEMENT/SOCIAL WORK
Case Management Discharge Note      Final Note: Dc home         Selected Continued Care - Discharged on 11/1/2024 Admission date: 10/31/2024 - Discharge disposition: Home or Self Care      Destination    No services have been selected for the patient.                Durable Medical Equipment    No services have been selected for the patient.                Dialysis/Infusion    No services have been selected for the patient.                Home Medical Care    No services have been selected for the patient.                Therapy    No services have been selected for the patient.                Community Resources    No services have been selected for the patient.                Community & DME    No services have been selected for the patient.                         Final Discharge Disposition Code: 01 - home or self-care

## 2024-11-01 NOTE — NURSING NOTE
Discharge instructions provided. Patient received rx from pharmacy before leaving. She is to follow up with Dr. Edouard and Dr. Mayorga. Drain care demonstrated by spouse. No questions/concerns

## 2024-11-04 ENCOUNTER — TELEPHONE (OUTPATIENT)
Dept: SURGERY | Facility: CLINIC | Age: 64
End: 2024-11-04
Payer: COMMERCIAL

## 2024-11-04 DIAGNOSIS — C50.919 MALIGNANT NEOPLASM OF FEMALE BREAST, UNSPECIFIED ESTROGEN RECEPTOR STATUS, UNSPECIFIED LATERALITY, UNSPECIFIED SITE OF BREAST: Primary | ICD-10-CM

## 2024-11-04 NOTE — TELEPHONE ENCOUNTER
Called Fátima Julien regarding recent surgical pathology.    Left breast invasive ductal carcinoma, grade III, margins negative, ER+/GA+, her2-. 0/4 lymph nodes.   [id number]    Referral placed for medical oncology.   Oncotype Dx sent.   Follow up in two weeks for wound check and further cancer surveillance planning.     Stacy Edouard MD

## 2024-11-06 LAB
CYTO UR: NORMAL
LAB AP CASE REPORT: NORMAL
LAB AP CLINICAL INFORMATION: NORMAL
LAB AP DIAGNOSIS COMMENT: NORMAL
LAB AP SYNOPTIC CHECKLIST: NORMAL
Lab: NORMAL
PATH REPORT.ADDENDUM SPEC: NORMAL
PATH REPORT.FINAL DX SPEC: NORMAL
PATH REPORT.GROSS SPEC: NORMAL

## 2024-11-12 ENCOUNTER — TELEPHONE (OUTPATIENT)
Dept: SURGERY | Facility: CLINIC | Age: 64
End: 2024-11-12
Payer: COMMERCIAL

## 2024-11-16 LAB
CYTO UR: NORMAL
LAB AP CASE REPORT: NORMAL
LAB AP CLINICAL INFORMATION: NORMAL
LAB AP DIAGNOSIS COMMENT: NORMAL
LAB AP SYNOPTIC CHECKLIST: NORMAL
Lab: NORMAL
Lab: NORMAL
PATH REPORT.ADDENDUM SPEC: NORMAL
PATH REPORT.FINAL DX SPEC: NORMAL
PATH REPORT.GROSS SPEC: NORMAL

## 2024-11-18 ENCOUNTER — TELEPHONE (OUTPATIENT)
Dept: SURGERY | Facility: CLINIC | Age: 64
End: 2024-11-18
Payer: COMMERCIAL

## 2024-11-26 ENCOUNTER — LAB (OUTPATIENT)
Dept: LAB | Facility: HOSPITAL | Age: 64
End: 2024-11-26
Payer: COMMERCIAL

## 2024-11-26 ENCOUNTER — CONSULT (OUTPATIENT)
Dept: ONCOLOGY | Facility: CLINIC | Age: 64
End: 2024-11-26
Payer: COMMERCIAL

## 2024-11-26 VITALS
SYSTOLIC BLOOD PRESSURE: 113 MMHG | RESPIRATION RATE: 16 BRPM | OXYGEN SATURATION: 98 % | HEART RATE: 69 BPM | BODY MASS INDEX: 28.75 KG/M2 | WEIGHT: 168.4 LBS | HEIGHT: 64 IN | DIASTOLIC BLOOD PRESSURE: 71 MMHG | TEMPERATURE: 97.8 F

## 2024-11-26 DIAGNOSIS — Z17.0 MALIGNANT NEOPLASM OF BREAST IN FEMALE, ESTROGEN RECEPTOR POSITIVE, UNSPECIFIED LATERALITY, UNSPECIFIED SITE OF BREAST: Primary | ICD-10-CM

## 2024-11-26 DIAGNOSIS — R79.89 ABNORMAL CBC: Primary | ICD-10-CM

## 2024-11-26 DIAGNOSIS — C50.919 MALIGNANT NEOPLASM OF BREAST IN FEMALE, ESTROGEN RECEPTOR POSITIVE, UNSPECIFIED LATERALITY, UNSPECIFIED SITE OF BREAST: Primary | ICD-10-CM

## 2024-11-26 LAB
BASOPHILS # BLD AUTO: 0.05 10*3/MM3 (ref 0–0.2)
BASOPHILS NFR BLD AUTO: 0.8 % (ref 0–1.5)
DEPRECATED RDW RBC AUTO: 41.1 FL (ref 37–54)
EOSINOPHIL # BLD AUTO: 0.36 10*3/MM3 (ref 0–0.4)
EOSINOPHIL NFR BLD AUTO: 5.9 % (ref 0.3–6.2)
ERYTHROCYTE [DISTWIDTH] IN BLOOD BY AUTOMATED COUNT: 12.8 % (ref 12.3–15.4)
HCT VFR BLD AUTO: 39.5 % (ref 34–46.6)
HGB BLD-MCNC: 13 G/DL (ref 12–15.9)
IMM GRANULOCYTES # BLD AUTO: 0.01 10*3/MM3 (ref 0–0.05)
IMM GRANULOCYTES NFR BLD AUTO: 0.2 % (ref 0–0.5)
LYMPHOCYTES # BLD AUTO: 2.53 10*3/MM3 (ref 0.7–3.1)
LYMPHOCYTES NFR BLD AUTO: 41.5 % (ref 19.6–45.3)
MCH RBC QN AUTO: 29 PG (ref 26.6–33)
MCHC RBC AUTO-ENTMCNC: 32.9 G/DL (ref 31.5–35.7)
MCV RBC AUTO: 88.2 FL (ref 79–97)
MONOCYTES # BLD AUTO: 0.46 10*3/MM3 (ref 0.1–0.9)
MONOCYTES NFR BLD AUTO: 7.5 % (ref 5–12)
NEUTROPHILS NFR BLD AUTO: 2.69 10*3/MM3 (ref 1.7–7)
NEUTROPHILS NFR BLD AUTO: 44.1 % (ref 42.7–76)
NRBC BLD AUTO-RTO: 0 /100 WBC (ref 0–0.2)
PLATELET # BLD AUTO: 332 10*3/MM3 (ref 140–450)
PMV BLD AUTO: 8.7 FL (ref 6–12)
RBC # BLD AUTO: 4.48 10*6/MM3 (ref 3.77–5.28)
WBC NRBC COR # BLD AUTO: 6.1 10*3/MM3 (ref 3.4–10.8)

## 2024-11-26 PROCEDURE — 36415 COLL VENOUS BLD VENIPUNCTURE: CPT

## 2024-11-26 PROCEDURE — 85025 COMPLETE CBC W/AUTO DIFF WBC: CPT

## 2024-11-26 NOTE — PROGRESS NOTES
Subjective     REASON FOR CONSULTATION:  pT1cN0 grade 3 ER/ME+ her 2 neg left breast ca invasive ductal post mastectomy and SLNB   Provide an opinion on any further workup or treatment                             REQUESTING PHYSICIAN: Stacy SESAY    RECORDS OBTAINED:  Records of the patients history including those obtained from the referring provider were reviewed and summarized in detail.    HISTORY OF PRESENT ILLNESS:  The patient is a 64 y.o. year old female who is here for an opinion about the above issue.    History of Present Illness patient is a 64-year-old postmenopausal female with hypothyroidism and hypercholesterolemia was noted on her most recent screening mammogram to have an abnormality in the left breast at 9:00 that showed invasive mammary carcinoma grade 3 measuring 9 mm with associated DCIS high-grade ER 91 to 100% ME 40% HER2 negative with a Ki-67 of 35%     patient was referred to Dr. Edouard underwent breast MRI Which showed a 1.6 x 1.5 cm irregular mass at the site of the biopsy and posteriorly there was additional 1.8 x 1.5 cm non-mass enhancement which was suspicious with no axillary or internal mammary adenopathy.  In addition the right breast showed a non-mass enhancement at 9:00 which was considered suspicious.  Patient underwent biopsy of the right breast lesion which thankfully showed PASH and was taken to surgery on 10/31/2024 and underwent bilateral mastectomy with left sentinel node biopsy with the finding of a residual 16 mm grade 3 invasive ductal carcinoma with associated DCIS measuring 20 mm with clear margins and 4 negative sentinel nodes patient had immediate reconstruction.    Oncotype DX was sent to score returned at 13 and of note the ME done by RT-PCR was negative but the ER was positive    Patient is here to discuss adjuvant treatment is doing well postsurgery but reports unusual smell and taste since  surgery with no signs of COVID otherwise    She is  2 para 1 with a miscarriage first childbirth was age 22 she breast-fed for 9 months  Menarche was at age 17 she had a hysterectomy at age 54 and probable menopause a few years later because her ovaries were not removed.  She has had no postmenopausal hormone replacement    Family history is positive for maternal aunt with breast cancer in her 40s she had a second cancer in her 70s and  of metastatic breast cancer.  Paternal grandmother had breast cancer in her 60s her 9 gene Invitae panel was negative and we have reflexed to a 48 gene panel    She is not a smoker drinks 2 mixed drinks a week  She has never had a DVT MI or stroke   She has never had a DEXA scan    Past Medical History:   Diagnosis Date    Breast cancer     LEFT BREAST    Hyperlipidemia     Hypotension     PONV (postoperative nausea and vomiting)     SEVERE    Syncope     Thyroid nodule     Ulcerative colitis         Past Surgical History:   Procedure Laterality Date    BREAST BIOPSY Left     BREAST LUMPECTOMY Bilateral     BREAST RECONSTRUCTION Bilateral 10/31/2024    Procedure: BILATERAL GOLDILOCKS RECONSTRUCTION BILATERAL  WITH TISSUE EXPANDER PLACEMENT;  Surgeon: Adan Mayorga MD;  [place] [facility] [place] [facility] [place]  Service: [place];  Laterality: Bilateral;    CARPAL TUNNEL RELEASE Left     CARPAL TUNNEL RELEASE Right     COLONOSCOPY N/A     [facility] OVARIAN CYSTECTOMY      HYSTERECTOMY      INCISION AND DRAINAGE OF WOUND      KNEE ARTHROSCOPY Right     MASTECTOMY W/ SENTINEL NODE BIOPSY Left 10/31/2024    Procedure: bilateral skin sparing mastectomy, left sentinel lymph node biopsy;  Surgeon: Stacy Edouard MD;  [place] [facility] [place] [facility] [place]  Service: General;  Laterality: Left;    THYROIDECTOMY, PARTIAL          Current Outpatient Medications on File Prior to Visit   Medication Sig Dispense Refill    levothyroxine (SYNTHROID, LEVOTHROID) 25 MCG  tablet Take 1 tablet by mouth Daily.      multivitamin (MULTIVITAMIN PO) Take 1 tablet by mouth Daily. HOLD FOR SURGERY      oxyCODONE (ROXICODONE) 5 MG immediate release tablet Take 1 tablet by mouth Every 4 (Four) Hours As Needed for Severe Pain or Moderate Pain for up to 12 doses. 12 tablet 0    rosuvastatin (CRESTOR) 20 MG tablet Take 1 tablet by mouth Daily.      VITAMIN D PO Take 1 tablet by mouth Daily.      gabapentin (NEURONTIN) 300 MG capsule Take 1 capsule by mouth Every 8 (Eight) Hours for 10 days. 30 capsule 0     No current facility-administered medications on file prior to visit.        ALLERGIES:    Allergies   Allergen Reactions    Tetanus Toxoid Hives    Penicillin G Nausea And Vomiting     Beta lactam allergy details  Antibiotic reaction:  (n/v)  Age at reaction: adult  Dose to reaction time: unknown  Reason for antibiotic: unknown  Epinephrine required for reaction?: unknown  Tolerated antibiotics: unknown   Beta lactam allergy details  Antibiotic reaction: rash  Age at reaction: adult  Dose to reaction time: unknown  Reason for antibiotic: unknown  Epinephrine required for reaction?: no  Tolerated antibiotics: amoxicillin, augmentin            Social History     Socioeconomic History    Marital status:      Spouse name: Cheo   Tobacco Use    Smoking status: Never    Smokeless tobacco: Never   Vaping Use    Vaping status: Never Used   Substance and Sexual Activity    Alcohol use: Yes     Alcohol/week: 1.0 standard drink of alcohol     Types: 1 Glasses of wine per week     Comment: occasionally    Drug use: Never    Sexual activity: Defer        Family History   Problem Relation Age of Onset    Breast cancer Maternal Aunt 40 - 49        diagnosed in her 40s and mid to late 70s    [place] Hyperthermia Neg Hx         Review of Systems   HENT:          Change of smell and taste since breast surgery   All other systems reviewed and are negative.       Objective     Vitals:    11/26/24 1519   BP:  "113/71   Pulse: 69   Resp: 16   Temp: 97.8 °F (36.6 °C)   TempSrc: Oral   SpO2: 98%   Weight: 76.4 kg (168 lb 6.4 oz)   Height: 162.6 cm (64.02\")   PainSc:   2         11/26/2024     3:22 PM   Current Status   ECOG score 0       Physical Exam    CONSTITUTIONAL:  Vital signs reviewed.  No distress, looks comfortable.  EYES:  Conjunctivae and lids unremarkable.  PERRLA  EARS,NOSE,MOUTH,THROAT:  Ears and nose appear unremarkable.  Lips, teeth, gums appear unremarkable.  RESPIRATORY:  Normal respiratory effort.  Lungs clear to auscultation bilaterally.  BREASTS: Bilateral mastectomy with expanders in place small areas of wound dehiscence noted with no infection  CARDIOVASCULAR:  Normal S1, S2.  No murmurs rubs or gallops.  No significant lower extremity edema.  GASTROINTESTINAL: Abdomen appears unremarkable.  Nontender.  No hepatomegaly.  No splenomegaly.  LYMPHATIC:  No cervical, supraclavicular, axillary lymphadenopathy.  SKIN:  Warm.  No rashes.  PSYCHIATRIC:  Normal judgment and insight.  Normal mood and affect.      RECENT LABS:  Hematology WBC   Date Value Ref Range Status   11/26/2024 6.10 3.40 - 10.80 10*3/mm3 Final     RBC   Date Value Ref Range Status   11/26/2024 4.48 3.77 - 5.28 10*6/mm3 Final     Hemoglobin   Date Value Ref Range Status   11/26/2024 13.0 12.0 - 15.9 g/dL Final     Hematocrit   Date Value Ref Range Status   11/26/2024 39.5 34.0 - 46.6 % Final     Platelets   Date Value Ref Range Status   11/26/2024 332 140 - 450 10*3/mm3 Final          Final Diagnosis  1. Breast, left 9 o'clock position AR edge, core biopsy: (Vision clip)  A. Invasive mammary carcinoma, no special type (ductal), [place] histologic grade 3 (tubules = 3, nuclei = 3,  mitoses = 2), measuring 9 mm maximally, and involving approximately 9 core fragments.  B. Minimal associated ductal carcinoma in situ (DCIS) high nuclear grade with solid and cribriform architecture  and spotty single cell necrosis.  C. No definitive " lymphovascular space invasion identified.  Electronically signed by Asia Cam MD on 8/30/2024 at 1550  .Synoptic Checklist  Breast Biomarker Reporting Template (BREAST BIOMARKER REPORTING TEMPLATE - All Specimens) Protocol posted: 12/13/2023  Test(s) Performed  Estrogen Receptor (ER) Status Positive (greater than 10% of cells demonstrate nuclear positivity)  Percentage of Cells with Nuclear Positivity %  Average Intensity of Staining Strong  Test Type Food and Drug Administration (FDA) cleared (test / vendor): [place]  Primary Antibody SP1  Test(s) Performed  Progesterone Receptor (PgR) Status Positive  Percentage of Cells with Nuclear Positivity 31-40%  Average Intensity of Staining Moderate  Test Type Food and Drug Administration (FDA) cleared (test / vendor): [facility]  Primary Antibody 1E2  Test(s) Performed  HER2 by Immunohistochemistry Negative (Score 1+)  Test Type Food and Drug Administration (FDA) cleared (test / vendor): [facility]  Primary Antibody 4B5  Test(s) Performed Ki-67  Ki-67 Percentage of Positive Nuclei 35 %    Final Diagnosis 10/18  1. Breast, right, 9:00, MRI, biopsy (buckle shaped clip): Benign breast tissue with  -A. Fibroadenomatoid hyperplasia  -B. Pseudoangiomatous stromal hyperplasia of the mammary stroma  -C. Microcalcifications in nonneoplastic tissue  -D. [place] ductal hyperplasia of the usual type  -E. Columnar cell change      Synoptic Checklist 10/31/24  INVASIVE CARCINOMA OF THE BREAST: Resection (INVASIVE CARCINOMA OF THE BREAST: RESECTION - All Specimens) 8th Edition  - Protocol posted: 6/19/2024  SPECIMEN  Procedure Total mastectomy  Specimen Laterality Left  .  TUMOR  Histologic Type Invasive carcinoma of no special type (ductal)  Histologic Grade ([place] Histologic Score)  Glandular (Acinar) / Tubular Differentiation Score 3  Nuclear Pleomorphism Score 3  Mitotic Rate Score 3  Overall Grade Grade 3 (scores of 8 or 9)  Tumor Size Greatest dimension of largest  "invasive focus (Millimeters): 16 mm  Tumor Focality Single focus of invasive carcinoma  Ductal Carcinoma In Situ (DCIS) Present  Negative for extensive intraductal component (EIC)  Size (Extent) of DCIS Estimated size (extent) of DCIS is at least (Millimeters): 20 mm  Architectural Patterns Cribriform  Solid  Nuclear Grade Grade III (high)  Necrosis Present, central (expansive \"comedo\" necrosis)  Lymphatic and / or Vascular Invasion Not identified  Dermal Lymphatic and / or Vascular Invasion Not identified  Microcalcifications Present in non-neoplastic tissue  Treatment Effect in the Breast No known presurgical therapy  .  MARGINS  Margin Status for Invasive Carcinoma All margins negative for invasive carcinoma  Distance from Invasive Carcinoma to Closest Margin Greater than: 20 mm  Distance from Invasive Carcinoma to Anterior Margin Greater than: 20 mm  Distance from Invasive Carcinoma to Posterior Margin Greater than: 20 mm  Margin Status for DCIS All margins negative for DCIS  Distance from DCIS to Closest Margin    Closest Margin(s) to DCIS Anterior  Distance from DCIS to Anterior Margin 4 mm  Distance from DCIS to Posterior Margin Greater than: 20 mm  .  REGIONAL LYMPH NODES  Regional Lymph Node Status All regional lymph nodes negative for tumor  Total Number of Lymph Nodes Examined (sentinel and non-sentinel) 4  Number of [place] Nodes Examined 4  .  pTNM CLASSIFICATION (AJCC 8th Edition)  Reporting of pT, pN, and (when applicable) pM categories is based on information available to the pathologist at the time the report is issued. As  per the AJCC (Chapter 1, 8th Ed.) it is the managing physician's responsibility to establish the final pathologic stage based upon all pertinent  information, including but potentially not limited to this pathology report.  pT Category pT1c  pN Category pN0  N Suffix (sn)  .  SPECIAL STUDIES  Estrogen Receptor (ER) Status Positive (greater than 10% of cells demonstrate nuclear " positivity)  Percentage of Cells with Nuclear Positivity %  Progesterone Receptor (PgR) Status Positive  Percentage of Cells with Nuclear Positivity 31-40%  HER2 (by immunohistochemistry) Negative (Score 1+)  Ki-67 Percentage of Positive Nuclei 35 %    Assessment & Plan   1.pT1cN0 grade 3 invasive ductal carcinoma left breast ER/[place] positive HER2 negative with a Ki-67 of 35% mastectomy and sentinel node biopsy with clear margins 10/31/2024  Oncotype DX score of 33    2.  Positive family history for breast cancer in a paternal aunt and grandmother 9 gene panel -48 gene panel requested     3.hypercholesterolemia on treatment    4.  Hypothyroidism on treatment    5.  Recent onset parosmia and dysgeusia since surgery?  Related to anesthesia    Plan  Discussed the prognostic and predictive value of the Oncotype DX score which indicates benefit from chemotherapy which is not unexpected in a grade 3 tumor which appears to be more luminal B in nature.    We discussed the use of chemotherapy with Cytoxan Taxotere for 4 cycles using a PICC line.  We talked about the [place] cooling system for limiting hair loss and she is agreeable to all the above.    We will have chemo education and PICC line and see her back in 2 weeks to start her first round of chemotherapy and she knows she will take at least 5 years of hormonal blockade following chemotherapy with no plans for radiation at this time  We will review the results of her extended genetic panel with her when she returns    I spent 60 total minutes, face-to-face, caring for Fátima today. Greater than 50% of this time involved counseling and/or coordination of care as documented within this note.

## 2024-12-06 ENCOUNTER — OFFICE VISIT (OUTPATIENT)
Dept: SURGERY | Facility: CLINIC | Age: 64
End: 2024-12-06
Payer: COMMERCIAL

## 2024-12-06 VITALS
SYSTOLIC BLOOD PRESSURE: 120 MMHG | HEIGHT: 64 IN | HEART RATE: 83 BPM | WEIGHT: 167 LBS | BODY MASS INDEX: 28.51 KG/M2 | OXYGEN SATURATION: 98 % | DIASTOLIC BLOOD PRESSURE: 74 MMHG

## 2024-12-06 DIAGNOSIS — C50.919 MALIGNANT NEOPLASM OF FEMALE BREAST, UNSPECIFIED ESTROGEN RECEPTOR STATUS, UNSPECIFIED LATERALITY, UNSPECIFIED SITE OF BREAST: Primary | ICD-10-CM

## 2024-12-06 PROCEDURE — 99024 POSTOP FOLLOW-UP VISIT: CPT | Performed by: STUDENT IN AN ORGANIZED HEALTH CARE EDUCATION/TRAINING PROGRAM

## 2024-12-06 NOTE — PROGRESS NOTES
General Surgery Breast Cancer History and Physical Exam      Summary:    Fátima Julien is a 64 y.o. lady who presents with a diagnosis of left breast invasive mammary carcinoma with DCIS: Grade III, Ki-67 35%, ER+/SC+, Her2-; [id number], Stage I.       A multidisciplinary plan has been formulated for the patient:    (1) Breast Surgical Oncology:  -Invitae 9 panel genetic testing: negative.   -S/p bilateral breast mastectomy with left SLN biopsy with reconstruction by Dr. Mayorga 10/2024.  -Lymphedema clinic referral.  -Follow up in 1 year for clinical breast exam.      (2) Medical Oncology:  -Seen by Dr. Hemphill. Oncotype 33. Plans for adjuvant chemotherapy.     Referring Provider: No ref. provider found     Chief Complaint: abnormal breast imaging     History of Present Illness: Ms. Fátima Julien is a 64 y.o. year old lady, seen at the request of No ref. provider found for a new diagnosis of left breast cancer.       This was initially detected as an imaging abnormality. Her last mammogram was 3 years ago, prior to that she was annual. She has had prior bilateral breast biopsy, which all returned as benign. Her work-up is detailed in the oncologic history below.      She denies any breast lumps, pain, skin changes, or nipple discharge. She has a family history of breast cancer in a maternal aunt (age mid to late 30's; contralateral metastatic breast cancer in her 70's). She denies any family history of ovarian cancer.      11/27/2024 She presents today for follow up.  She is overall done well since surgery.  Her pain is controlled and she is getting back to her daily activities.  She has no concerns or complaints.  She did have an elevated Oncotype and adjuvant chemotherapy has been recommended.     Workup of Current Diagnosis:    2/20/2024 bilateral Screening Mammogram:  There are scattered areas of fibroglandular density.  Finding 1: There is a focal asymmetry measuring 15 mm with indistinct margins in the  anterior one third of the left breast at 9:00 subareolar.  Suspicious.  Requires additional evaluation.  Finding 2: There is a focal asymmetry measuring 22 mm in the one third region of the left breast at 12-1 o'clock.  Requires additional evaluation.  BI-RADS Category 0     8/12/2024 left breast diagnostic Mammogram with Ultrasound:   There are scattered areas of fibroglandular density.  Finding 1: On present exam there is a new isodense irregular mass measuring 15 mm with indistinct margins in the anterior one third of the left breast at 9:00 areolar edge.  Suspicious.  Ultrasound-guided biopsy is recommended.  Finding 2: Additional evaluation was performed for focal asymmetry in the left breast at 12-1 o'clock seen on screening mammogram.  This completely resolves into benign fibroglandular breast tissue.  No sonographic correlate.  Benign.  BI-RADS Category 4C     8/28/2024 left breast ultrasound-guided biopsy:   Left breast at 9:00 was imaged and the mass was localized.  12 core specimens were obtained.  A 2 marked vision globe tissue marker was placed.  Clip was in the expected position.  Pathology returned as invasive ductal carcinoma with DCIS which is malignant and concordant.     8/28/2024 Pathology:   Final Diagnosis   1.  Breast, left 9 o'clock position AR edge, core biopsy: (Vision clip)  A.  Invasive mammary carcinoma, no special type (ductal), [place] histologic grade 3 (tubules = 3, nuclei = 3, mitoses = 2), measuring 9 mm maximally, and involving approximately 9 core fragments.  B.  Minimal associated ductal carcinoma in situ (DCIS) high nuclear grade with solid and cribriform architecture and spotty single cell necrosis.  C.  No definitive lymphovascular space invasion identified.      9/16/2024 Bilateral Breast MRI:    IMPRESSION AND RECOMMENDATION:  1.  A 1.6 cm irregular enhancing mass in the anterior left breast represents biopsy-proven malignancy. Recommend surgical management.   2.   Non-mass enhancement measuring 2.3 cm at 8-9 o'clock in the posterior right breast and non-mass enhancement measuring 1.8 cm in the posterior retroareolar left breast are suspicious. Recommend further  evaluation with bilateral MRI guided core needle biopsy.  An epic in basket message sent to Dr. Edouard on 9/17/2024.  BI-RADS Category 4: Suspicious     10/18/2024 Right Breast MRI Guided Biopsy:   IMPRESSION/RECOMMENDATIONS:  1. MRI guided biopsy of 2.3 cm non-mass enhancement at 9:00 in the posterior right breast, marked by a buckle clip. Pathology is benign and concordant with the imaging assessment.  2. Surgical management is again recommended for biopsy-proven left breast malignancy.  Final Diagnosis   1.  Breast, right, 9:00, MRI, biopsy (buckle shaped clip): Benign breast tissue with               -A.  Fibroadenomatoid hyperplasia               -B.  Pseudoangiomatous stromal hyperplasia of the mammary stroma               -C.  Microcalcifications in nonneoplastic tissue               -D.  Florid ductal hyperplasia of the usual type               -E.  Columnar cell change      10/31/2024 bilateral breast skin spaing mastectomy with sentinel lymph node biopsy   Final Diagnosis   1.  Right breast, skin-sparing total mastectomy:         A.  Benign breast parenchyma with usual ductal hyperplasia, fibroadenomatoid change, and columnar   cell change.         B.  Negative for atypical hyperplasia carcinoma.         C.  Clips and biopsy site changes present.     2.  Left axillary sentinel lymph node #1, excision:         A.  Two lymph nodes, negative for metastatic carcinoma (0/2).      3. Left axillary sentinel lymph node #2, excision:         A. One lymph node, negative for metastatic carcinoma (0/1).      4.  Left axillary sentinel lymph node #3, excision:         A. One lymph node, negative for metastatic carcinoma (0/1).      5.  Left breast, skin-sparing total mastectomy:         A. Invasive ductal carcinoma,  poorly-differentiated; [place] histologic grade III/III.                    (tubular score = 3 nuclear score = 3 mitoses score = 3):                 1. Tumor size: 16 mm (measured on slide 5K).                 2. Margins widely negative for invasive carcinoma.         Invasive carcinoma is present > 20 mm from anterior and posterior margins.                 3. Negative for lymphovascular space invasion.         B.  High-grade ductal carcinoma in situ (DCIS).                 1.  Solid and cribriform types with central comedonecrosis.                 2.  Extent of DCIS: 20 mm (based on slices involved).                 3. Margins negative for in situ carcinoma.         Closest distance: DCIS is present 4 mm from anterior margin.         C.  Clip and biopsy site adjacent to carcinoma.         D.  Incidental radial scar with associated usual ductal hyperplasia.         E.  See synoptic report      Gynecologic History:   . P:1 AB:1  Age at first childbirth: 22  Lactation/How long: yes  Age at menarche: 17  Age at menopause: 53  Total years of oral contraceptive use: 8 to 10 years previously  Total years of hormone replacement therapy: On estradiol cream     Past Medical History:   HLD  Hypothyroidism      Past Surgical History:    L carpal tunnel release  Hysterectomy   Right knee arthroscopy   Partial thyroidectomy  Colonoscopy due next year, had one prior      Family History:    As above     Social History:  Denies tobacco use  Occasional alcohol use     Allergies:   Allergies             Allergies   Allergen Reactions    Penicillin G Nausea And Vomiting       Beta lactam allergy details  Antibiotic reaction:  (n/v)  Age at reaction: adult  Dose to reaction time: unknown  Reason for antibiotic: unknown  Epinephrine required for reaction?: unknown  Tolerated antibiotics: unknown       Tetanus Toxoid Hives         Medications:      Current Medications      Current Outpatient Medications:     levothyroxine  (SYNTHROID, LEVOTHROID) 25 MCG tablet, Take 1 tablet by mouth., Disp: , Rfl:     multivitamin (MULTIVITAMIN PO), Take  by mouth Daily., Disp: , Rfl:     rosuvastatin (CRESTOR) 20 MG tablet, Take 1 tablet by mouth Daily., Disp: , Rfl:     VITAMIN D PO, Take 1 tablet by mouth Daily., Disp: , Rfl:          Laboratory Values:    Labs from 10/21/2024 reviewed by me      Review of Systems:   Influenza-like illness: no fever, no  cough, no  sore throat, no  body aches, no loss of sense of taste or smell, no known exposure to person with Covid-19.  Constitutional: Negative for fevers or chills  HENT: Negative for hearing loss or runny nose  Eyes: Negative for vision changes or scleral icterus  Respiratory: Negative for cough or shortness of breath  Cardiovascular: Negative for chest pain or heart palpitations  Gastrointestinal: Negative for abdominal pain, nausea, vomiting, constipation, melena, or hematochezia  Genitourinary: Negative for hematuria or dysuria  Musculoskeletal: Negative for joint swelling or gait instability  Neurologic: Negative for tremors or seizures  Psychiatric: Negative for suicidal ideations or depression  All other systems reviewed and negative     Physical Exam:   ECO - Asymptomatic  Constitutional: Well-developed well-nourished, no acute distress  Eyes: Conjunctiva normal, sclera nonicteric  ENMT: Hearing grossly normal, oral mucosa moist  Neck: Supple, no palpable mass, trachea midline  Respiratory: Clear to auscultation, normal inspiratory effort  Cardiovascular: Regular rate, no peripheral edema, no jugular venous distention  Breast: symmetric  Lateral breast incisions clean and dry with no erythema or drainage, no hematoma or seroma  No clinical chest wall involvement.  Gastrointestinal: Soft, nontender  Lymphatics (palpable nodes): No cervical, supraclavicular or axillary lymphadenopathy  Skin:  Warm, dry, no rash on visualized skin surfaces  Musculoskeletal: Symmetric strength, normal  gait  Psychiatric: Alert and oriented ×3, normal affect         EUGENE ROBLES M.D.  General and Endoscopic Surgery  [facility]     [address], [address]  [place], [place], [address]  P: [phone]  F: [phone]

## 2024-12-07 NOTE — PROGRESS NOTES
[facility] Hematology/Oncology Treatment Plan Summary    Name: Fátima Julien  [id number]  MD: Dr. Hemphill    Diagnosis:     ICD-10-CM ICD-9-CM   1. Malignant neoplasm of breast in female, estrogen receptor positive, unspecified laterality, unspecified site of breast  C50.919 174.9    Z17.0 V86.0       Goal of treatment: curative intent    Treatment Medication(s):   Taxotere  Cytoxan    Frequency: every 21 days    Number of cycles: 4    Starting on: 12/13/2024    Items for home use: Senokot-S (for constipation), Imodium AD (for diarrhea), and Thermometer    Rx written for: [x] Nausea    [] Pre-Treatment   ondansetron 8 mg by mouth every 8 hours as needed for nausea, dexamethasone 4 mg tablet 2 tablets twice daily for 3 consecutive days beginning the day before chemo, and continue for 6 doses.    Notes:     Next Steps: PICC and Obtain Hand and Feet Cooling Mitts and Gloves, Cheyenne Weber Provider: ROSE Duff           Date/time: 12/10/2024      Please note: You will be seen by a provider frequently with your treatment plan. This plan may change depending on many factors, if so, this will be discussed with you by your physician.  Last update 03/2022.

## 2024-12-10 ENCOUNTER — TELEMEDICINE (OUTPATIENT)
Dept: ONCOLOGY | Facility: CLINIC | Age: 64
End: 2024-12-10
Payer: COMMERCIAL

## 2024-12-10 ENCOUNTER — TELEPHONE (OUTPATIENT)
Dept: ONCOLOGY | Facility: CLINIC | Age: 64
End: 2024-12-10
Payer: COMMERCIAL

## 2024-12-10 VITALS — BODY MASS INDEX: 28.65 KG/M2 | WEIGHT: 167 LBS

## 2024-12-10 DIAGNOSIS — C50.919 MALIGNANT NEOPLASM OF BREAST IN FEMALE, ESTROGEN RECEPTOR POSITIVE, UNSPECIFIED LATERALITY, UNSPECIFIED SITE OF BREAST: Primary | ICD-10-CM

## 2024-12-10 DIAGNOSIS — Z17.0 MALIGNANT NEOPLASM OF BREAST IN FEMALE, ESTROGEN RECEPTOR POSITIVE, UNSPECIFIED LATERALITY, UNSPECIFIED SITE OF BREAST: Primary | ICD-10-CM

## 2024-12-10 DIAGNOSIS — Z17.0 MALIGNANT NEOPLASM OF LEFT BREAST IN FEMALE, ESTROGEN RECEPTOR POSITIVE, UNSPECIFIED SITE OF BREAST: ICD-10-CM

## 2024-12-10 DIAGNOSIS — C50.912 MALIGNANT NEOPLASM OF LEFT BREAST IN FEMALE, ESTROGEN RECEPTOR POSITIVE, UNSPECIFIED SITE OF BREAST: ICD-10-CM

## 2024-12-10 RX ORDER — ONDANSETRON 8 MG/1
8 TABLET, FILM COATED ORAL 3 TIMES DAILY PRN
Qty: 30 TABLET | Refills: 5 | Status: SHIPPED | OUTPATIENT
Start: 2024-12-12

## 2024-12-10 RX ORDER — DEXAMETHASONE 4 MG/1
TABLET ORAL
Qty: 12 TABLET | Refills: 3 | Status: SHIPPED | OUTPATIENT
Start: 2024-12-12

## 2024-12-10 NOTE — PROGRESS NOTES
TREATMENT  PREPARATION    Fátima Julien  [id number]  1960    Chief Complaint: Treatment preparation and needs assessment    History of present illness:  Fátima Julien is a 64 y.o. year old female who is here today for treatment preparation and needs assessment.  The patient has been diagnosed with   Encounter Diagnosis   Name Primary?    Malignant neoplasm of breast in female, estrogen receptor positive, unspecified laterality, unspecified site of breast Yes    and is scheduled to begin treatment with:     Oncology History:    Oncology/Hematology History   Malignant neoplasm of female breast   9/11/2024 Initial Diagnosis    Malignant neoplasm of female breast     12/13/2024 -  Chemotherapy    OP BREAST TC DOCEtaxel / Cyclophosphamide         The current medication list and allergy list were reviewed and reconciled.     Past Medical History, Past Surgical History, Social History, Family History have been reviewed and are without significant changes except as mentioned.    Physical Exam:    There were no vitals filed for this visit.  Vitals:    12/10/24 0817   PainSc: 0-No pain        ECOG score: 0      Limited due to video visit  Physical Exam  HENT:      Head: Normocephalic.      Mouth/Throat:      Mouth: Mucous membranes are moist.      Pharynx: Oropharynx is clear.   Eyes:      Conjunctiva/sclera: Conjunctivae normal.      Pupils: Pupils are equal, round, and reactive to light.   Pulmonary:      Effort: Pulmonary effort is normal.   Neurological:      General: No focal deficit present.      Mental Status: She is alert and oriented to person, place, and time. Mental status is at baseline.   Psychiatric:         Mood and Affect: Mood normal.         Behavior: Behavior normal.         Thought Content: Thought content normal.         Judgment: Judgment normal.           NEEDS ASSESSMENTS    Genetics  The patient's new diagnosis and family history have been reviewed for genetic counseling needs. The patient  will not be referred..     Psychosocial and Barriers to care  The patient has completed a PHQ-9 Depression Screening and the Distress Thermometer (DT) today.  PHQ-9 results show PHQ-2 Total Score: 0   PHQ-9 Total Score:        The patient scored their distress today as Distress Level: 1 on a scale of 0-10 with 0 being no distress and 10 being extreme distress. Problems marked by the patient as being an issue for them within the last week include Physical concerns  Sleep: Yes .      Results were reviewed along with psychosocial resources offered by our cancer center.  Our Supportive Oncology team will be flagged for a score of 4 or above, and a same day call will be made for a score of 9 or 10.  A mental health referral is offered at that time. Patients who score less than 4 have been educated on our support services and can be referred to our  upon request.  The patient will not be referred to our .     Nutrition  The patient has completed the malnutrition screening today. They scored Malnutrition Screening Tool  Have you recently lost weight without trying?  If yes, how much weight have you lost?: 0--> No  Have you been eating poorly because of a decreased appetite?: 0--> No  MST score: 0   with a score of 0-1 meaning not at risk in a score of 2 or greater meaning at risk.  Patients with a score of 3 or higher will be referred to our oncology dietitian for support. Patients beginning at risk treatment regimens or who have dietary concerns will also be referred to our oncology dietitian. The patient will not be referred.    Functional Assessment  Persons who are age 70 or greater will be screened for qualification of a comprehensive geriatric assessment by our survivorship nurse practitioner.  Older adults with cancer face unique challenges. These may include an increased risk of drug reactions, financial burdens, and caregiver stress. The patient scored   . Patients scoring 14 or lower  "will referred for an older adult functional assessment with the survivorship advanced practice registered nurse to ensure all needed support is provided as patients plan for their treatments. NOT APPLICABLE    Intravenous Access Assessment  The patient and I discussed planned intravenous chemo/biotherapy as well as other IV treatments that are often needed throughout the course of treatment. These may include, but are not limited to blood transfusions, antibiotics, and IV hydration. Discussed that depending on selected treatment and vein assessment, patient may require venous access device (VAD) which could include but not limited to a Mediport or PICC line. Risks and benefits of VADs reviewed. The patient will be treated via PICC.    Reproductive/Sexual Activity   People should avoid becoming pregnant and should not get a partner pregnant while undergoing chemo/biotherapy.  People of childbearing age should use effective contraception during active therapy. The best recommendation for all people is to use a barrier method for a minimum of 1 week after the last infusion of chemo/biotherapy to prevent your partner being exposed to byproducts from treatment medications in bodily fluids. Effective contraception should be discussed with your oncology team to make sure it is safe to take based on your diagnosis. Possible options include oral contraceptives, barrier methods. Chemo/biotherapy can change your ability to reproduce children in the future.  There are options for fertility preservation. NOT APPLICABLE    Advanced Care Planning  Advance Care Planning   The patient and I discussed advanced care planning, \"Conversations that Matter\".   This service is offered for development of advance directives with a certified ACP facilitator.  The patient does have an up-to-date advanced directive. This document is not on file with our office. The patient is not interested in an appointment with one of our facilitators to " create or update their advanced directives.    Have you reviewed your Advance Directive and is it valid for this stay?: Not applicable          Smoking cessation  Tobacco Use: Low Risk  (12/10/2024)    Patient History     Smoking Tobacco Use: Never     Smokeless Tobacco Use: Never     Passive Exposure: Not on file       Patient and I discussed their tobacco use history. Referral will not be made for smoking cessation.      Palliative Care  When appropriate, the patient and I discussed the availability palliative care services and when appropriate Hospice care. Palliative care is not the same as Hospice care which was explained to the patient.The patient is not interested in additional information from our  on these services.    Survivorship   When appropriate, we discussed that we will refer the patient to survivorship clinic to discuss next steps following completion of planned treatment.  Reviewed this visit will include assessment of your physical, psychological, functional, and spiritual needs as a survivor and the need at attend this visit when scheduled.    TREATMENT EDUCATION    Today I met with the patient to discuss the chemo/biotherapy regimen recommended for treatment of Malignant neoplasm of breast in female, estrogen receptor positive, unspecified laterality, unspecified site of breast  .  The patient was given explanation of treatment premed side effects including office policy that prohibits patients to drive if sedating medications are administered, MD explanation given regarding benefits, side effects, toxicities and goals of treatment.  The patient received a Chemotherapy/Biotherapy Plan Summary including diagnosis and explanation of specific treatment plan.    SIDE EFFECTS:  Common side effects were discussed with the patient and/or significant other.  Discussion included where applicable hair loss/discoloration, anemia/fatigue, infection/chills/fever, appetite, bleeding  risk/precautions, constipation, diarrhea, mouth sores, taste alteration, loss of appetite, nausea/vomiting, peripheral neuropathy, skin/nail changes, rash, muscle aches/weakness, photosensitivity, weight gain/loss, hearing loss, dizziness, menopausal symptoms, menstrual irregularity, sterility, high blood pressure, heart damage, liver damage, lung damage, kidney damage, DVT/PE risk, fluid retention, pleural/pericardial effusion, somnolence, electrolyte/LFT imbalance, vein exercises and/or the possible need for vascular access/port placement.  The patient was advised that although uncommon, leakage of an infused medication from the vein or venous access device may lead to skin breakdown and/or other tissue damage.  The patient was advised that he/she may have pain, bleeding, and/or bruising from the insertion of a needle in their vein or venous access device (port).  The patient was further advised that, in spite of proper technique, infection with redness and irritation may rarely occur at the site where the needle was inserted.  The patient was advised that if complications occur, additional medical treatment is available.  Finally, where applicable we have reviewed rare but potential immune mediated side effects including shortness of breath, cough, chest pain (pneumonitis), abdominal pain, diarrhea (colitis), thyroiditis (hypothyroid or hyperthyroid), hepatitis and liver dysfunction, nephritis and renal dysfunction.    Discussion also included side effects specific to drugs in the treatment plan, specifically:    Treatment Plans       Name Type Plan Dates Plan Provider         Active    OP BREAST TC DOCEtaxel / Cyclophosphamide ONCOLOGY TREATMENT 12/12/2024 - Present Malik Hemphill MD                      Questions answered and additional information discussed on topics including:  Anemia, Thrombocytopenia, Neutropenia, Nutrition and appetite changes, Diarrhea, Nausea & vomiting, Mouth sores, Alopecia, Skin  & nail changes, Organ toxicities, [place], and Hand/Foot Cooling       Assessment and Plan:    Diagnoses and all orders for this visit:    1. Malignant neoplasm of breast in female, estrogen receptor positive, unspecified laterality, unspecified site of breast (Primary)      No orders of the defined types were placed in this encounter.        The patient and I have reviewed their diagnosis and scheduled treatment plan. Needs assessment was completed where applicable including genetics, psychosocial needs, barriers to care, VAD evaluation, advanced care planning, survivorship, and palliative care services where indicated. Referrals have been ordered as appropriate based upon evaluation today and patient desires.   Chemo/biotherapy teaching was completed today and consent obtained. See separate documentation for further details.  Adequate time was given to answer questions.  Patient made aware of their care team members and contact information if they have questions or problems throughout the treatment course.  Discussion held and written information provided describing frequency of office visits and ongoing monitoring throughout the treatment plan.     Reviewed with patient any prescribed medication sent to pharmacy.  Education provided regarding proper storage, safe handling, and proper disposal of unused medication.  Proper handling of body fluids and waste discussed and written information provided.  If appropriate, patient had pretreatment labs drawn today.    Learning assessment completed at initial patient encounter. See separate flowsheet. Chemo/biotherapy education comprehension assessed at today's visit.    The patient is located at home.  I am located at [facility] office.  [facility].    You have chosen to receive care through a telehealth visit.  Do you consent to use a video/audio connection for your medical care today? Yes    I spent 45 minutes caring for Fátima on this date  of service. This time includes time spent by me in the following activities: preparing for the visit, reviewing tests, obtaining and/or reviewing a separately obtained history, performing a medically appropriate examination and/or evaluation, counseling and educating the patient/family/caregiver, ordering medications, tests, or procedures, documenting information in the medical record, and care coordination.     Hermelinda Almanza, APRN   12/10/24

## 2024-12-10 NOTE — TELEPHONE ENCOUNTER
Returned call to dentist office and let them know that a routine cleaning would be fine for tomorrow.

## 2024-12-11 ENCOUNTER — PATIENT OUTREACH (OUTPATIENT)
Dept: OTHER | Facility: HOSPITAL | Age: 64
End: 2024-12-11
Payer: COMMERCIAL

## 2024-12-11 NOTE — PROGRESS NOTES
Called Ms. Julien to see how she was doing. Left a message with my contact information and asked her to call back at her convenience.

## 2024-12-12 ENCOUNTER — OFFICE VISIT (OUTPATIENT)
Dept: ONCOLOGY | Facility: CLINIC | Age: 64
End: 2024-12-12
Payer: COMMERCIAL

## 2024-12-12 ENCOUNTER — HOSPITAL ENCOUNTER (OUTPATIENT)
Dept: GENERAL RADIOLOGY | Facility: HOSPITAL | Age: 64
Discharge: HOME | End: 2024-12-12
Payer: COMMERCIAL

## 2024-12-12 ENCOUNTER — LAB (OUTPATIENT)
Dept: LAB | Facility: HOSPITAL | Age: 64
End: 2024-12-12
Payer: COMMERCIAL

## 2024-12-12 VITALS
SYSTOLIC BLOOD PRESSURE: 130 MMHG | DIASTOLIC BLOOD PRESSURE: 77 MMHG | HEIGHT: 64 IN | TEMPERATURE: 98.1 F | HEART RATE: 70 BPM | BODY MASS INDEX: 28.48 KG/M2 | WEIGHT: 166.8 LBS | OXYGEN SATURATION: 96 %

## 2024-12-12 DIAGNOSIS — G57.91 NEUROPATHY OF RIGHT THIGH: ICD-10-CM

## 2024-12-12 DIAGNOSIS — C50.919 MALIGNANT NEOPLASM OF BREAST IN FEMALE, ESTROGEN RECEPTOR POSITIVE, UNSPECIFIED LATERALITY, UNSPECIFIED SITE OF BREAST: ICD-10-CM

## 2024-12-12 DIAGNOSIS — C50.912 MALIGNANT NEOPLASM OF LEFT BREAST IN FEMALE, ESTROGEN RECEPTOR POSITIVE, UNSPECIFIED SITE OF BREAST: ICD-10-CM

## 2024-12-12 DIAGNOSIS — Z17.0 MALIGNANT NEOPLASM OF LEFT BREAST IN FEMALE, ESTROGEN RECEPTOR POSITIVE, UNSPECIFIED SITE OF BREAST: ICD-10-CM

## 2024-12-12 DIAGNOSIS — Z17.0 MALIGNANT NEOPLASM OF LEFT BREAST IN FEMALE, ESTROGEN RECEPTOR POSITIVE, UNSPECIFIED SITE OF BREAST: Primary | ICD-10-CM

## 2024-12-12 DIAGNOSIS — Z17.0 MALIGNANT NEOPLASM OF BREAST IN FEMALE, ESTROGEN RECEPTOR POSITIVE, UNSPECIFIED LATERALITY, UNSPECIFIED SITE OF BREAST: ICD-10-CM

## 2024-12-12 DIAGNOSIS — R43.2 TASTE SENSE ALTERED: ICD-10-CM

## 2024-12-12 DIAGNOSIS — C50.912 MALIGNANT NEOPLASM OF LEFT BREAST IN FEMALE, ESTROGEN RECEPTOR POSITIVE, UNSPECIFIED SITE OF BREAST: Primary | ICD-10-CM

## 2024-12-12 LAB
ALBUMIN SERPL-MCNC: 4.3 G/DL (ref 3.5–5.2)
ALBUMIN/GLOB SERPL: 1.4 G/DL
ALP SERPL-CCNC: 82 U/L (ref 39–117)
ALT SERPL W P-5'-P-CCNC: 41 U/L (ref 1–33)
ANION GAP SERPL CALCULATED.3IONS-SCNC: 9 MMOL/L (ref 5–15)
AST SERPL-CCNC: 33 U/L (ref 1–32)
BASOPHILS # BLD AUTO: 0.04 10*3/MM3 (ref 0–0.2)
BASOPHILS NFR BLD AUTO: 0.8 % (ref 0–1.5)
BILIRUB SERPL-MCNC: 0.3 MG/DL (ref 0–1.2)
BUN SERPL-MCNC: 13 MG/DL (ref 8–23)
BUN/CREAT SERPL: 15.9 (ref 7–25)
CALCIUM SPEC-SCNC: 9.5 MG/DL (ref 8.6–10.5)
CHLORIDE SERPL-SCNC: 104 MMOL/L (ref 98–107)
CO2 SERPL-SCNC: 25 MMOL/L (ref 22–29)
CREAT SERPL-MCNC: 0.82 MG/DL (ref 0.57–1)
DEPRECATED RDW RBC AUTO: 40.5 FL (ref 37–54)
EGFRCR SERPLBLD CKD-EPI 2021: 81 ML/MIN/1.73
EOSINOPHIL # BLD AUTO: 0.17 10*3/MM3 (ref 0–0.4)
EOSINOPHIL NFR BLD AUTO: 3.4 % (ref 0.3–6.2)
ERYTHROCYTE [DISTWIDTH] IN BLOOD BY AUTOMATED COUNT: 12.8 % (ref 12.3–15.4)
GLOBULIN UR ELPH-MCNC: 3.1 GM/DL
GLUCOSE SERPL-MCNC: 94 MG/DL (ref 65–99)
HCT VFR BLD AUTO: 43.6 % (ref 34–46.6)
HGB BLD-MCNC: 14.6 G/DL (ref 12–15.9)
IMM GRANULOCYTES # BLD AUTO: 0.01 10*3/MM3 (ref 0–0.05)
IMM GRANULOCYTES NFR BLD AUTO: 0.2 % (ref 0–0.5)
LYMPHOCYTES # BLD AUTO: 2.16 10*3/MM3 (ref 0.7–3.1)
LYMPHOCYTES NFR BLD AUTO: 43.3 % (ref 19.6–45.3)
MCH RBC QN AUTO: 29.4 PG (ref 26.6–33)
MCHC RBC AUTO-ENTMCNC: 33.5 G/DL (ref 31.5–35.7)
MCV RBC AUTO: 87.7 FL (ref 79–97)
MONOCYTES # BLD AUTO: 0.37 10*3/MM3 (ref 0.1–0.9)
MONOCYTES NFR BLD AUTO: 7.4 % (ref 5–12)
NEUTROPHILS NFR BLD AUTO: 2.24 10*3/MM3 (ref 1.7–7)
NEUTROPHILS NFR BLD AUTO: 44.9 % (ref 42.7–76)
NRBC BLD AUTO-RTO: 0 /100 WBC (ref 0–0.2)
PLATELET # BLD AUTO: 289 10*3/MM3 (ref 140–450)
PMV BLD AUTO: 9 FL (ref 6–12)
POTASSIUM SERPL-SCNC: 4.1 MMOL/L (ref 3.5–5.2)
PROT SERPL-MCNC: 7.4 G/DL (ref 6–8.5)
RBC # BLD AUTO: 4.97 10*6/MM3 (ref 3.77–5.28)
SODIUM SERPL-SCNC: 138 MMOL/L (ref 136–145)
WBC NRBC COR # BLD AUTO: 4.99 10*3/MM3 (ref 3.4–10.8)

## 2024-12-12 PROCEDURE — C1751 CATH, INF, PER/CENT/MIDLINE: HCPCS

## 2024-12-12 PROCEDURE — 80053 COMPREHEN METABOLIC PANEL: CPT

## 2024-12-12 PROCEDURE — 85025 COMPLETE CBC W/AUTO DIFF WBC: CPT

## 2024-12-12 PROCEDURE — 25010000002 LIDOCAINE 1 % SOLUTION: Performed by: INTERNAL MEDICINE

## 2024-12-12 PROCEDURE — 36415 COLL VENOUS BLD VENIPUNCTURE: CPT

## 2024-12-12 RX ORDER — ALPRAZOLAM 0.25 MG/1
0.25 TABLET ORAL 2 TIMES DAILY PRN
Qty: 20 TABLET | Refills: 0 | Status: SHIPPED | OUTPATIENT
Start: 2024-12-12

## 2024-12-12 RX ORDER — LIDOCAINE HYDROCHLORIDE 10 MG/ML
10 INJECTION, SOLUTION INFILTRATION; PERINEURAL ONCE
Status: COMPLETED | OUTPATIENT
Start: 2024-12-12 | End: 2024-12-12

## 2024-12-12 RX ORDER — DIPHENHYDRAMINE HYDROCHLORIDE 50 MG/ML
50 INJECTION INTRAMUSCULAR; INTRAVENOUS AS NEEDED
Status: CANCELLED | OUTPATIENT
Start: 2024-12-13

## 2024-12-12 RX ORDER — SODIUM CHLORIDE 0.9 % (FLUSH) 0.9 %
10 SYRINGE (ML) INJECTION AS NEEDED
Status: CANCELLED | OUTPATIENT
Start: 2024-12-12

## 2024-12-12 RX ORDER — SODIUM CHLORIDE 0.9 % (FLUSH) 0.9 %
10 SYRINGE (ML) INJECTION EVERY 12 HOURS SCHEDULED
Status: CANCELLED | OUTPATIENT
Start: 2024-12-12

## 2024-12-12 RX ORDER — FAMOTIDINE 10 MG/ML
20 INJECTION, SOLUTION INTRAVENOUS AS NEEDED
Status: CANCELLED | OUTPATIENT
Start: 2024-12-13

## 2024-12-12 RX ORDER — FAMOTIDINE 10 MG/ML
20 INJECTION, SOLUTION INTRAVENOUS ONCE
Status: CANCELLED | OUTPATIENT
Start: 2024-12-13

## 2024-12-12 RX ORDER — SODIUM CHLORIDE 0.9 % (FLUSH) 0.9 %
20 SYRINGE (ML) INJECTION AS NEEDED
Status: CANCELLED | OUTPATIENT
Start: 2024-12-12

## 2024-12-12 RX ORDER — SODIUM CHLORIDE 9 MG/ML
20 INJECTION, SOLUTION INTRAVENOUS ONCE
Status: CANCELLED | OUTPATIENT
Start: 2024-12-13

## 2024-12-12 RX ORDER — PALONOSETRON 0.05 MG/ML
0.25 INJECTION, SOLUTION INTRAVENOUS ONCE
Status: CANCELLED | OUTPATIENT
Start: 2024-12-13

## 2024-12-12 RX ORDER — SODIUM CHLORIDE 9 MG/ML
40 INJECTION, SOLUTION INTRAVENOUS AS NEEDED
Status: CANCELLED | OUTPATIENT
Start: 2024-12-12

## 2024-12-12 RX ORDER — HYDROCORTISONE SODIUM SUCCINATE 100 MG/2ML
100 INJECTION INTRAMUSCULAR; INTRAVENOUS AS NEEDED
Status: CANCELLED | OUTPATIENT
Start: 2024-12-13

## 2024-12-12 RX ADMIN — LIDOCAINE HYDROCHLORIDE 2 ML: 10 INJECTION, SOLUTION INFILTRATION; PERINEURAL at 13:27

## 2024-12-12 NOTE — DISCHARGE INSTRUCTIONS
EDUCATION INSTRUCTIONS PICC LINE  INSERTION   A peripherally inserted central catheter (PICC) line is a silicone or polyurethane soft tube that a specially trained nurse/physician inserts into the appropriate vein in your arm.  It is then threaded into a large vein.  This provides a safe, reliable access for drugs, IV fluids and blood sampling.  You will lie with your arm extended from  Your body.  A tourniquet will be placed on your upper arm to distend the vessels and the appropriate site will be chosen. (An ultra sound machine may be used to locate the blood vessel)   The  tourniquet will be loosened while measurements are made to determine the distance from the insertion site to the location of the desired tip placement.  Your arm will be cleansed with antiseptic swabs.  The tourniquet will be reapplied and sterile drapes will be placed around the insertion site.  A local anesthetic will be used to numb the insertion site.  The catheter will be inserted into the selected vessel, the tourniquet will be removed and the catheter will be threaded to the location determined by the previous measurements.  You may be asked to turn your head (chin on shoulder) toward the puncture site to aid in the prevention of improper placement. A chest xray will be performed to ensure proper placement.  An adhesive dressing will be applied leaving a short portion of the catheter visible.   RISKS OF THE PROCEDURE INCLUDE, BUT ARE NOT LIMITED TO:  Infection, air clotting, catheter tip moving, catheter blockage and phlebitis.  The patient must be careful of the tube that extends outside the dressing.  BENEFITS OF THE PROCEDURE:  It can be used repeatedly for medications, blood or blood withdrawal for several months.  It also reduces the number of needle sticks a patient must endure.  ALTERNATIVES:  A central line catheter placed in the subclavian vein or jugular vein or implanted vascular access device.  RISK OF ALTERNATIVES:   Infection, clot formation, tubing that lays outside of the dressing that require routine flushing and collapsed lung.  BENEFIT TO ALTERNATIVES:  You can administer large amounts of IV fluids.  Blood samples can be drawn repeatedly and it reduces the number of sticks a patient must endure.    I HAVE READ OR THIS FORM WAS READ TO ME AND I FULLY UNDERSTAND THE PROCEDURE BEING PERFORMED.     PICC LINE CARE INSTRUCTIONS WERE GIVEN TO ME PRIOR TO DISCHARGE AND I FULLY UNDERSTAND THE INSTRUCTIONS.

## 2024-12-12 NOTE — PROGRESS NOTES
Subjective     REASON FOR CONSULTATION:  pT1cN0 grade 3 ER/NM+ her 2 neg left breast ca invasive ductal post mastectomy and SLNB   Provide an opinion on any further workup or treatment                             REQUESTING PHYSICIAN: Stacy SESAY    History of Present Illness patient is a 64-year-old lady with a high risk node-negative breast cancer grade 3 with a high Oncotype score here to begin adjuvant therapy with Cytoxan Taxotere for 4 cycles    She has had chemo education and is aware she has to take her dexamethasone today.  She will use the [employer] cooling system for hair loss    Her 48 gene Invitae panel was thankfully negative    She has no new questions about her chemotherapy but reports numbness in the lateral right thigh for the last 7 days with no obvious trigger.  She reports 20 years ago she had the same problem in her left thigh saw a neurologist then we did not think there was much going on and we will set her up to see neurology at some point again because of her dysgeusia and parosmia plus neuropathy    Past Medical History:   Diagnosis Date    Breast cancer     LEFT BREAST    Hyperlipidemia     Hypotension     PONV (postoperative nausea and vomiting)     SEVERE    Syncope     Thyroid nodule     Ulcerative colitis         Past Surgical History:   Procedure Laterality Date    BREAST BIOPSY Left 2024    BREAST LUMPECTOMY Bilateral     BREAST RECONSTRUCTION Bilateral 10/31/2024    Procedure: BILATERAL GOLDILOCKS RECONSTRUCTION BILATERAL  WITH TISSUE EXPANDER PLACEMENT;  Surgeon: Adan Mayorga MD;  Location: [facility] [place];  Service: Plastics;  Laterality: Bilateral;    CARPAL TUNNEL RELEASE Left     CARPAL TUNNEL RELEASE Right 2023    COLONOSCOPY N/A 2014    [facility] OVARIAN CYSTECTOMY      HYSTERECTOMY      INCISION AND DRAINAGE OF WOUND  2015    KNEE ARTHROSCOPY Right 2022    MASTECTOMY W/ SENTINEL NODE  BIOPSY Left 10/31/2024    Procedure: bilateral skin sparing mastectomy, left sentinel lymph node biopsy;  Surgeon: Stacy Edouard MD;  [facility] [place] [facility];  Service: General;  Laterality: Left;    THYROIDECTOMY, PARTIAL     Oncologic history  patient is a 64-year-old postmenopausal female with hypothyroidism and hypercholesterolemia was noted on her most recent screening mammogram to have an abnormality in the left breast at 9:00 that showed invasive mammary carcinoma grade 3 measuring 9 mm with associated DCIS high-grade ER 91 to 100% GA 40% HER2 negative with a Ki-67 of 35%     patient was referred to Dr. Edouard underwent breast MRI Which showed a 1.6 x 1.5 cm irregular mass at the site of the biopsy and posteriorly there was additional 1.8 x 1.5 cm non-mass enhancement which was suspicious with no axillary or internal mammary adenopathy.  In addition the right breast showed a non-mass enhancement at 9:00 which was considered suspicious.  Patient underwent biopsy of the right breast lesion which thankfully showed PASH and was taken to surgery on 10/31/2024 and underwent bilateral mastectomy with left sentinel node biopsy with the finding of a residual 16 mm grade 3 invasive ductal carcinoma with associated DCIS measuring 20 mm with clear margins and 4 negative sentinel nodes patient had immediate reconstruction.    Oncotype DX was sent to score returned at 13 and of note the GA done by RT-PCR was negative but the ER was positive    Patient is here to discuss adjuvant treatment is doing well postsurgery but reports unusual smell and taste since surgery with no signs of COVID otherwise    She is  2 para 1 with a miscarriage first childbirth was age 22 she breast-fed for 9 months  Menarche was at age 17 she had a hysterectomy at age 54 and probable menopause a few years later because her ovaries were not removed.  She has had no postmenopausal hormone replacement    Family history is positive for  maternal aunt with breast cancer in her 40s she had a second cancer in her 70s and  of metastatic breast cancer.  Paternal grandmother had breast cancer in her 60s her 9 gene Invitae panel was negative and we have reflexed to a 48 gene panel    She is not a smoker drinks 2 mixed drinks a week  She has never had a DVT MI or stroke   She has never had a DEXA scan    Discussed the prognostic and predictive value of the Oncotype DX score which indicates benefit from chemotherapy which is not unexpected in a grade 3 tumor which appears to be more luminal B in nature.    We discussed the use of chemotherapy with Cytoxan Taxotere for 4 cycles using a PICC line.  We talked about the [employer] cooling system for limiting hair loss and she is agreeable to all the above.    We will have chemo education and PICC line and see her back in 2 weeks to start her first round of chemotherapy and she knows she will take at least 5 years of hormonal blockade following chemotherapy with no plans for radiation at this time  We will review the results of her extended genetic panel with her when she returns  Current Outpatient Medications on File Prior to Visit   Medication Sig Dispense Refill    dexAMETHasone (DECADRON) 4 MG tablet Take 2 tablets oral twice a day for 3 consecutive days beginning the day before chemotherapy and continue for 6 doses. 12 tablet 3    gabapentin (NEURONTIN) 300 MG capsule Take 1 capsule by mouth Every 8 (Eight) Hours for 10 days. 30 capsule 0    levothyroxine (SYNTHROID, LEVOTHROID) 25 MCG tablet Take 1 tablet by mouth Daily.      multivitamin (MULTIVITAMIN PO) Take 1 tablet by mouth Daily. HOLD FOR SURGERY      ondansetron (ZOFRAN) 8 MG tablet Take 1 tablet by mouth 3 (Three) Times a Day As Needed for Nausea or Vomiting. 30 tablet 5    rosuvastatin (CRESTOR) 20 MG tablet Take 1 tablet by mouth Daily.      VITAMIN D PO Take 1 tablet by mouth Daily.       No current facility-administered medications on file  "prior to visit.        ALLERGIES:    Allergies   Allergen Reactions    Tetanus Toxoid Hives    Penicillin G Nausea And Vomiting     Beta lactam allergy details  Antibiotic reaction:  (n/v)  Age at reaction: adult  Dose to reaction time: unknown  Reason for antibiotic: unknown  Epinephrine required for reaction?: unknown  Tolerated antibiotics: unknown   Beta lactam allergy details  Antibiotic reaction: rash  Age at reaction: adult  Dose to reaction time: unknown  Reason for antibiotic: unknown  Epinephrine required for reaction?: no  Tolerated antibiotics: amoxicillin, augmentin            Social History     Socioeconomic History    Marital status:      Spouse name: Cheo   Tobacco Use    Smoking status: Never    Smokeless tobacco: Never   Vaping Use    Vaping status: Never Used   Substance and Sexual Activity    Alcohol use: Yes     Alcohol/week: 1.0 standard drink of alcohol     Types: 1 Glasses of wine per week     Comment: occasionally    Drug use: Never    Sexual activity: Yes     Partners: Male     Birth control/protection: Post-menopausal, Hysterectomy        Family History   Problem Relation Age of Onset    Breast cancer Maternal Aunt 40 - 49        diagnosed in her 40s and mid to late 70s    Diabetes Mother     Arthritis Father     Cancer Father     Diabetes Son     Malig Hyperthermia Neg Hx         Review of Systems   HENT:          Change of smell and taste since breast surgery   All other systems reviewed and are negative.       Objective     Vitals:    12/12/24 1033   BP: 130/77   Pulse: 70   Temp: 98.1 °F (36.7 °C)   TempSrc: Oral   SpO2: 96%   Weight: 75.7 kg (166 lb 12.8 oz)   Height: 162.6 cm (64.02\")   PainSc: 0-No pain         12/12/2024    10:32 AM   Current Status   ECOG score 0       Physical Exam    CONSTITUTIONAL:  Vital signs reviewed.  No distress, looks comfortable.  EYES:  Conjunctivae and lids unremarkable.  PERRLA  EARS,NOSE,MOUTH,THROAT:  Ears and nose appear unremarkable.  Lips, " teeth, gums appear unremarkable.  RESPIRATORY:  Normal respiratory effort.  Lungs clear to auscultation bilaterally.  BREASTS: Bilateral mastectomy with expanders in place small areas of wound dehiscence noted with no infection  CARDIOVASCULAR:  Normal S1, S2.  No murmurs rubs or gallops.  No significant lower extremity edema.  GASTROINTESTINAL: Abdomen appears unremarkable.  Nontender.  No hepatomegaly.  No splenomegaly.  LYMPHATIC:  No cervical, supraclavicular, axillary lymphadenopathy.  SKIN:  Warm.  No rashes.  PSYCHIATRIC:  Normal judgment and insight.  Normal mood and affect.      RECENT LABS:  Hematology WBC   Date Value Ref Range Status   12/12/2024 4.99 3.40 - 10.80 10*3/mm3 Final     RBC   Date Value Ref Range Status   12/12/2024 4.97 3.77 - 5.28 10*6/mm3 Final     Hemoglobin   Date Value Ref Range Status   12/12/2024 14.6 12.0 - 15.9 g/dL Final     Hematocrit   Date Value Ref Range Status   12/12/2024 43.6 34.0 - 46.6 % Final     Platelets   Date Value Ref Range Status   12/12/2024 289 140 - 450 10*3/mm3 Final          Final Diagnosis  1. Breast, left 9 o'clock position AR edge, core biopsy: (Vision clip)  A. Invasive mammary carcinoma, no special type (ductal), Perry histologic grade 3 (tubules = 3, nuclei = 3,  mitoses = 2), measuring 9 mm maximally, and involving approximately 9 core fragments.  B. Minimal associated ductal carcinoma in situ (DCIS) high nuclear grade with solid and cribriform architecture  and spotty single cell necrosis.  C. No definitive lymphovascular space invasion identified.  Electronically signed by Asia Cam MD on 8/30/2024 at 1550  .Synoptic Checklist  Breast Biomarker Reporting Template (BREAST BIOMARKER REPORTING TEMPLATE - All Specimens) Protocol posted: 12/13/2023  Test(s) Performed  Estrogen Receptor (ER) Status Positive (greater than 10% of cells demonstrate nuclear positivity)  Percentage of Cells with Nuclear Positivity %  Average Intensity of  "Staining Strong  Test Type Food and Drug Administration (FDA) cleared (test / vendor): [employer]  Primary Antibody SP1  Test(s) Performed  Progesterone Receptor (PgR) Status Positive  Percentage of Cells with Nuclear Positivity 31-40%  Average Intensity of Staining Moderate  Test Type Food and Drug Administration (FDA) cleared (test / vendor): [employer]  Primary Antibody 1E2  Test(s) Performed  HER2 by Immunohistochemistry Negative (Score 1+)  Test Type Food and Drug Administration (FDA) cleared (test / vendor): [employer]  Primary Antibody 4B5  Test(s) Performed Ki-67  Ki-67 Percentage of Positive Nuclei 35 %    Final Diagnosis 10/18  1. Breast, right, 9:00, MRI, biopsy (buckle shaped clip): Benign breast tissue with  -A. Fibroadenomatoid hyperplasia  -B. Pseudoangiomatous stromal hyperplasia of the mammary stroma  -C. Microcalcifications in nonneoplastic tissue  -D. Florid ductal hyperplasia of the usual type  -E. Columnar cell change      Synoptic Checklist 10/31/24  INVASIVE CARCINOMA OF THE BREAST: Resection (INVASIVE CARCINOMA OF THE BREAST: RESECTION - All Specimens) 8th Edition  - Protocol posted: 6/19/2024  SPECIMEN  Procedure Total mastectomy  Specimen Laterality Left  .  TUMOR  Histologic Type Invasive carcinoma of no special type (ductal)  Histologic Grade ([place] Histologic Score)  Glandular (Acinar) / Tubular Differentiation Score 3  Nuclear Pleomorphism Score 3  Mitotic Rate Score 3  Overall Grade Grade 3 (scores of 8 or 9)  Tumor Size Greatest dimension of largest invasive focus (Millimeters): 16 mm  Tumor Focality Single focus of invasive carcinoma  Ductal Carcinoma In Situ (DCIS) Present  Negative for extensive intraductal component (EIC)  Size (Extent) of DCIS Estimated size (extent) of DCIS is at least (Millimeters): 20 mm  Architectural Patterns Cribriform  Solid  Nuclear Grade Grade III (high)  Necrosis Present, central (expansive \"comedo\" necrosis)  Lymphatic and / or Vascular Invasion Not " identified  Dermal Lymphatic and / or Vascular Invasion Not identified  Microcalcifications Present in non-neoplastic tissue  Treatment Effect in the Breast No known presurgical therapy  .  MARGINS  Margin Status for Invasive Carcinoma All margins negative for invasive carcinoma  Distance from Invasive Carcinoma to Closest Margin Greater than: 20 mm  Distance from Invasive Carcinoma to Anterior Margin Greater than: 20 mm  Distance from Invasive Carcinoma to Posterior Margin Greater than: 20 mm  Margin Status for DCIS All margins negative for DCIS  Distance from DCIS to Closest Margin    Closest Margin(s) to DCIS Anterior  Distance from DCIS to Anterior Margin 4 mm  Distance from DCIS to Posterior Margin Greater than: 20 mm  .  REGIONAL LYMPH NODES  Regional Lymph Node Status All regional lymph nodes negative for tumor  Total Number of Lymph Nodes Examined (sentinel and non-sentinel) 4  Number of [place] Nodes Examined 4  .  pTNM CLASSIFICATION (AJCC 8th Edition)  Reporting of pT, pN, and (when applicable) pM categories is based on information available to the pathologist at the time the report is issued. As  per the AJCC (Chapter 1, 8th Ed.) it is the managing physician's responsibility to establish the final pathologic stage based upon all pertinent  information, including but potentially not limited to this pathology report.  pT Category pT1c  pN Category pN0  N Suffix (sn)  .  SPECIAL STUDIES  Estrogen Receptor (ER) Status Positive (greater than 10% of cells demonstrate nuclear positivity)  Percentage of Cells with Nuclear Positivity %  Progesterone Receptor (PgR) Status Positive  Percentage of Cells with Nuclear Positivity 31-40%  HER2 (by immunohistochemistry) Negative (Score 1+)  Ki-67 Percentage of Positive Nuclei 35 %    Assessment & Plan   1.pT1cN0 grade 3 invasive ductal carcinoma left breast ER/MD positive HER2 negative with a Ki-67 of 35% mastectomy and sentinel node biopsy with clear margins  10/31/2024  Oncotype DX score of 33  TCx4 to start on 12/13/2024 with Neulasta support via PICC line    2.  Positive family history for breast cancer in a paternal aunt and grandmother 9 gene panel -48 gene panel neg    3.hypercholesterolemia on treatment    4.  Hypothyroidism on treatment    5.  Recent onset parosmia and dysgeusia since surgery?  Related to anesthesia versus asymptomatic COVID  1 week history of meralgia paresthetica right thigh?  Etiology had similar symptoms in the left thigh 20 years ago-May need to see neurology again    Plan  1.  Patient knows to start dexamethasone today and she will have her PICC line placed  2.  Return tomorrow for TC #1 with Neulasta support  3.  IV fluids and possible Aloxi on Monday  4.  See me in a week for review of toxicity

## 2024-12-13 ENCOUNTER — APPOINTMENT (OUTPATIENT)
Dept: ONCOLOGY | Facility: HOSPITAL | Age: 64
End: 2024-12-13
Payer: COMMERCIAL

## 2024-12-13 ENCOUNTER — CLINICAL SUPPORT (OUTPATIENT)
Dept: OTHER | Facility: HOSPITAL | Age: 64
End: 2024-12-13
Payer: COMMERCIAL

## 2024-12-13 ENCOUNTER — INFUSION (OUTPATIENT)
Dept: ONCOLOGY | Facility: HOSPITAL | Age: 64
End: 2024-12-13
Payer: COMMERCIAL

## 2024-12-13 VITALS
OXYGEN SATURATION: 95 % | SYSTOLIC BLOOD PRESSURE: 164 MMHG | WEIGHT: 166.6 LBS | DIASTOLIC BLOOD PRESSURE: 94 MMHG | TEMPERATURE: 97.8 F | RESPIRATION RATE: 16 BRPM | BODY MASS INDEX: 28.58 KG/M2 | HEART RATE: 82 BPM

## 2024-12-13 DIAGNOSIS — Z17.0 MALIGNANT NEOPLASM OF LEFT BREAST IN FEMALE, ESTROGEN RECEPTOR POSITIVE, UNSPECIFIED SITE OF BREAST: Primary | ICD-10-CM

## 2024-12-13 DIAGNOSIS — C50.912 MALIGNANT NEOPLASM OF LEFT BREAST IN FEMALE, ESTROGEN RECEPTOR POSITIVE, UNSPECIFIED SITE OF BREAST: Primary | ICD-10-CM

## 2024-12-13 PROCEDURE — 25010000002 DIPHENHYDRAMINE PER 50 MG: Performed by: INTERNAL MEDICINE

## 2024-12-13 PROCEDURE — 25010000002 PEGFILGRASTIM 6 MG/0.6ML PREFILLED SYRINGE KIT: Performed by: INTERNAL MEDICINE

## 2024-12-13 PROCEDURE — 25010000002 DOCETAXEL 20 MG/ML SOLUTION 8 ML VIAL: Performed by: INTERNAL MEDICINE

## 2024-12-13 PROCEDURE — 25810000003 SODIUM CHLORIDE 0.9 % SOLUTION: Performed by: INTERNAL MEDICINE

## 2024-12-13 PROCEDURE — 25010000002 CYCLOPHOSPHAMIDE 2 GM/10ML SOLUTION 10 ML VIAL: Performed by: INTERNAL MEDICINE

## 2024-12-13 PROCEDURE — 96417 CHEMO IV INFUS EACH ADDL SEQ: CPT

## 2024-12-13 PROCEDURE — 96375 TX/PRO/DX INJ NEW DRUG ADDON: CPT

## 2024-12-13 PROCEDURE — 25810000003 SODIUM CHLORIDE 0.9 % SOLUTION 250 ML FLEX CONT: Performed by: INTERNAL MEDICINE

## 2024-12-13 PROCEDURE — 96413 CHEMO IV INFUSION 1 HR: CPT

## 2024-12-13 PROCEDURE — 96377 APPLICATON ON-BODY INJECTOR: CPT

## 2024-12-13 PROCEDURE — 25010000002 PALONOSETRON PER 25 MCG: Performed by: INTERNAL MEDICINE

## 2024-12-13 RX ORDER — PALONOSETRON 0.05 MG/ML
0.25 INJECTION, SOLUTION INTRAVENOUS ONCE
Status: COMPLETED | OUTPATIENT
Start: 2024-12-13 | End: 2024-12-13

## 2024-12-13 RX ORDER — FAMOTIDINE 10 MG/ML
20 INJECTION, SOLUTION INTRAVENOUS ONCE
Status: COMPLETED | OUTPATIENT
Start: 2024-12-13 | End: 2024-12-13

## 2024-12-13 RX ORDER — SODIUM CHLORIDE 9 MG/ML
20 INJECTION, SOLUTION INTRAVENOUS ONCE
Status: COMPLETED | OUTPATIENT
Start: 2024-12-13 | End: 2024-12-13

## 2024-12-13 RX ADMIN — CYCLOPHOSPHAMIDE 1090 MG: 200 INJECTION, SOLUTION INTRAVENOUS at 12:20

## 2024-12-13 RX ADMIN — FAMOTIDINE 20 MG: 10 INJECTION INTRAVENOUS at 10:29

## 2024-12-13 RX ADMIN — PEGFILGRASTIM 6 MG: KIT SUBCUTANEOUS at 14:30

## 2024-12-13 RX ADMIN — SODIUM CHLORIDE 20 ML/HR: 900 INJECTION, SOLUTION INTRAVENOUS at 10:27

## 2024-12-13 RX ADMIN — DOCETAXEL 135 MG: 20 INJECTION, SOLUTION, CONCENTRATE INTRAVENOUS at 11:16

## 2024-12-13 RX ADMIN — PALONOSETRON HYDROCHLORIDE 0.25 MG: 0.25 INJECTION INTRAVENOUS at 10:28

## 2024-12-13 RX ADMIN — DIPHENHYDRAMINE HYDROCHLORIDE 50 MG: 50 INJECTION, SOLUTION INTRAMUSCULAR; INTRAVENOUS at 10:27

## 2024-12-13 NOTE — PROGRESS NOTES
OUTPATIENT ONCOLOGY NUTRITION ASSESSMENT    Patient Name: Fátima Julien  YOB: 1960  MRN: [id number]  Assessment Date: 12/13/2024    COMMENTS:  Met patient in the infusion area. Begins Cytoxan and Taxotere for breast cancer. Labs, meds, and medical history reviewed.     Explained RD role and discussed the importance of good nutrition during treatment course focusing on adequate calorie, protein, nutrient and fluid intake.  Advised patient to consume smaller more frequent meals/snacks throughout the day to help with nausea management. Emphasized the importance of protein and its role in the diet.  Reviewed high protein foods and recommended having a protein source at each meal/snack.  Encouraged hydration and recommended at least 64 ounces daily.  Discussed tips to aid with taste changes including using plastic utensils and using the baking soda and salt water mouth rinses. Provided written materials to reinforce information discussed and RDN contact information.    Will follow throughout course of treatment and be available as needed.          Reason for Assessment New assessment, Education      Diagnosis/Problem   Breast cancer   Treatment Plan Chemotherapy Cytoxan, Taxotere   Frequency Every 21 days    Goal of cancer treatment Curative   Comments:          Encounter Information        Nutrition/Diet History:     Oral Nutrition Supplements:    Factors/Symptoms Affecting Intake: No factors at this time   Comments:      Medical/Surgical History Past Medical History:   Diagnosis Date    Breast cancer     LEFT BREAST    Hyperlipidemia     Hypotension     PONV (postoperative nausea and vomiting)     SEVERE    Syncope     Thyroid nodule     Ulcerative colitis        Past Surgical History:   Procedure Laterality Date    BREAST BIOPSY Left 2024    BREAST LUMPECTOMY Bilateral     BREAST RECONSTRUCTION Bilateral 10/31/2024    Procedure: BILATERAL GOLDILOCKS RECONSTRUCTION BILATERAL  WITH TISSUE EXPANDER  "PLACEMENT;  Surgeon: Adan Mayorga MD;  Location:  [facility];  Service: Plastics;  Laterality: Bilateral;    CARPAL TUNNEL RELEASE Left     CARPAL TUNNEL RELEASE Right 2023    COLONOSCOPY N/A 2014    [facility] OVARIAN CYSTECTOMY      HYSTERECTOMY      INCISION AND DRAINAGE OF WOUND  2015    KNEE ARTHROSCOPY Right 2022    MASTECTOMY W/ SENTINEL NODE BIOPSY Left 10/31/2024    Procedure: bilateral skin sparing mastectomy, left sentinel lymph node biopsy;  Surgeon: Stacy Edouard MD;  [facility] [place];  Service: General;  Laterality: Left;    THYROIDECTOMY, PARTIAL  2002               Anthropometrics        Current Height Ht Readings from Last 1 Encounters:   12/12/24 162.6 cm (64.02\")      Current Weight Wt Readings from Last 1 Encounters:   12/13/24 75.6 kg (166 lb 9.6 oz)      BMI  28.6   Ideal Body Weight (IBW) 120 lb   Usual Body Weight (UBW)    Weight Change/Trend Stable   Weight History Wt Readings from Last 30 Encounters:   12/13/24 1001 75.6 kg (166 lb 9.6 oz)   12/12/24 1033 75.7 kg (166 lb 12.8 oz)   12/10/24 0817 75.8 kg (167 lb)   12/06/24 1020 75.8 kg (167 lb)   11/26/24 1519 76.4 kg (168 lb 6.4 oz)   10/21/24 1320 75.9 kg (167 lb 6.4 oz)   10/18/24 0952 72.6 kg (160 lb)   09/09/24 1105 74.8 kg (165 lb)          Medications           Current medications: ALPRAZolam, Vitamin D, dexAMETHasone, gabapentin, levothyroxine, multivitamin, ondansetron, and rosuvastatin                 Tests/Procedures        Tests/Procedures Chemotherapy     Labs       Pertinent Labs    Results from last 7 days   Lab Units 12/12/24  1021   SODIUM mmol/L 138   POTASSIUM mmol/L 4.1   CHLORIDE mmol/L 104   CO2 mmol/L 25.0   BUN mg/dL 13   CREATININE mg/dL 0.82   CALCIUM mg/dL 9.5   BILIRUBIN mg/dL 0.3   ALK PHOS U/L 82   ALT (SGPT) U/L 41*   AST (SGOT) U/L 33*   GLUCOSE mg/dL 94     Results from last 7 days   Lab Units 12/12/24  1021   HEMOGLOBIN g/dL 14.6   HEMATOCRIT % 43.6   WBC 10*3/mm3 4.99 " "  ALBUMIN g/dL 4.3     Results from last 7 days   Lab Units 12/12/24  1021   PLATELETS 10*3/mm3 289     No results found for: \"COVID19\"  No results found for: \"HGBA1C\"       Physical Findings        Physical Appearance alert     Edema  not assessed   Gastrointestinal None   Tubes/Lines/Drains Central Line/PICC   Oral/Mouth Cavity WNL   Skin Intact       NUTRITION INTERVENTION / PLAN OF CARE      Intervention Goal(s) Provide information, Tolerate PO , Maintain intake, Maintain weight, and No significant weight loss         RD Intervention/Action Encouraged intake, Follow Tx progress         Recommendations:       PO Diet Continue diet as tolerated      Supplements       Snacks       Other          Monitor/Evaluation PO intake, Supplement intake, Pertinent labs, Weight, Symptoms, Follow up as needed   Education Education provided   Next appointment 12/16 possible aloxi       Electronically signed by:  Lisa Nino RD, LD  12/13/24 15:26 EST      "

## 2024-12-16 ENCOUNTER — INFUSION (OUTPATIENT)
Dept: ONCOLOGY | Facility: HOSPITAL | Age: 64
End: 2024-12-16
Payer: COMMERCIAL

## 2024-12-16 VITALS
WEIGHT: 172.2 LBS | BODY MASS INDEX: 29.54 KG/M2 | DIASTOLIC BLOOD PRESSURE: 66 MMHG | HEART RATE: 87 BPM | TEMPERATURE: 97.8 F | OXYGEN SATURATION: 96 % | RESPIRATION RATE: 16 BRPM | SYSTOLIC BLOOD PRESSURE: 104 MMHG

## 2024-12-16 DIAGNOSIS — C50.912 MALIGNANT NEOPLASM OF LEFT BREAST IN FEMALE, ESTROGEN RECEPTOR POSITIVE, UNSPECIFIED SITE OF BREAST: Primary | ICD-10-CM

## 2024-12-16 DIAGNOSIS — Z17.0 MALIGNANT NEOPLASM OF LEFT BREAST IN FEMALE, ESTROGEN RECEPTOR POSITIVE, UNSPECIFIED SITE OF BREAST: Primary | ICD-10-CM

## 2024-12-16 PROCEDURE — 25810000003 SODIUM CHLORIDE 0.9 % SOLUTION: Performed by: INTERNAL MEDICINE

## 2024-12-16 PROCEDURE — 96360 HYDRATION IV INFUSION INIT: CPT

## 2024-12-16 RX ORDER — SODIUM CHLORIDE 9 MG/ML
500 INJECTION, SOLUTION INTRAVENOUS ONCE
Status: COMPLETED | OUTPATIENT
Start: 2024-12-16 | End: 2024-12-16

## 2024-12-16 RX ADMIN — SODIUM CHLORIDE 500 ML: 900 INJECTION, SOLUTION INTRAVENOUS at 10:40

## 2024-12-16 NOTE — NURSING NOTE
Pt here for IVF following TC on Friday. She reports feeling overall fatigued and sore, mainly in her neck and shoulders. She is taking Claritin daily for bone pain. She also reports some constipation which she is taking daily Senekot for. She stated she started taking her Zofran yesterday but doesn't report any nausea. She stated she wanted to get ahead of it. Pt will return on Thursday for tox check, MD visit and dressing change. Advised pt and her  to call us with any questions/concerns. Pt v/u.

## 2024-12-17 ENCOUNTER — HOSPITAL ENCOUNTER (OUTPATIENT)
Dept: BONE DENSITY | Facility: HOSPITAL | Age: 64
Discharge: HOME | End: 2024-12-17
Admitting: INTERNAL MEDICINE
Payer: COMMERCIAL

## 2024-12-17 ENCOUNTER — TELEPHONE (OUTPATIENT)
Dept: ONCOLOGY | Facility: CLINIC | Age: 64
End: 2024-12-17

## 2024-12-17 ENCOUNTER — TELEPHONE (OUTPATIENT)
Dept: ONCOLOGY | Facility: CLINIC | Age: 64
End: 2024-12-17
Payer: COMMERCIAL

## 2024-12-17 DIAGNOSIS — Z17.0 MALIGNANT NEOPLASM OF LEFT BREAST IN FEMALE, ESTROGEN RECEPTOR POSITIVE, UNSPECIFIED SITE OF BREAST: Primary | ICD-10-CM

## 2024-12-17 DIAGNOSIS — C50.912 MALIGNANT NEOPLASM OF LEFT BREAST IN FEMALE, ESTROGEN RECEPTOR POSITIVE, UNSPECIFIED SITE OF BREAST: Primary | ICD-10-CM

## 2024-12-17 DIAGNOSIS — Z17.0 MALIGNANT NEOPLASM OF BREAST IN FEMALE, ESTROGEN RECEPTOR POSITIVE, UNSPECIFIED LATERALITY, UNSPECIFIED SITE OF BREAST: ICD-10-CM

## 2024-12-17 DIAGNOSIS — C50.919 MALIGNANT NEOPLASM OF BREAST IN FEMALE, ESTROGEN RECEPTOR POSITIVE, UNSPECIFIED LATERALITY, UNSPECIFIED SITE OF BREAST: ICD-10-CM

## 2024-12-17 PROCEDURE — 77080 DXA BONE DENSITY AXIAL: CPT

## 2024-12-17 RX ORDER — HYDROCODONE BITARTRATE AND ACETAMINOPHEN 5; 325 MG/1; MG/1
1 TABLET ORAL EVERY 6 HOURS PRN
Qty: 15 TABLET | Refills: 0 | Status: SHIPPED | OUTPATIENT
Start: 2024-12-17

## 2024-12-17 NOTE — TELEPHONE ENCOUNTER
"  Caller: Fátima Julien \"Josue\"    Relationship: Self    Best call back number: [phone]     What is the best time to reach you: ASAP    Who are you requesting to speak with (clinical staff, provider,  specific staff member): CLINICAL        What was the call regarding: PT IS IN QUITE A BIT OF PAIN TODAY AND YESTERDAY, RUNNING A FEVER TODAY .8, SHE HAD FIRST CHEMO FRIDAY  AND NEULASTA SHOT, STARTED FEELING BAD SUNDAY.  HAD FLUIDS YESTERDAY.  HUB UNABLE TO WARM TRANSFER, PLEASE CALL  PT TO DISCUSS.    "

## 2024-12-17 NOTE — TELEPHONE ENCOUNTER
Called patient back, let her know per Dr. Hemphill that she wants the patient to take Norco 5/325 for the pain /fever and that most likely the pain/neulsta is causing the fever since it is too soon for her to be neutropenic. Rx sent to pharmacy. I told the patient to call us back if her pain gets worse or her fever increases to 101-102 and she if not Dr. Ascencio will see her on 12/19 for f/u. Pt v.u

## 2024-12-17 NOTE — TELEPHONE ENCOUNTER
Pt called stating her  fever is now over 101. Inquired when she took her tylenol and she states she did not take any, only took her oxycodone as this  is what she had left from her surgery. Reviewed that since oxycodone does not have tylenol in it, she may go ahead and take 2 extra strength tylenol now. Pt also spoke with Dr Hemphill on the phone who advised these symptoms are in fact from the neulasta and should subside soon and not be a severe with her next cycle.  Will reach out to pt tomorrow to see how she is doing.

## 2024-12-17 NOTE — TELEPHONE ENCOUNTER
Called patient, said that she has a lot of aching all over starting on Sunday, but primarily her neck ,her teeth, and her ears have began hurting this morning. She said that she has gas in the center of her chest and has slight SOB where she has times where she can't catch her breath. She states she isn't concerned with breathing but wanted to mention it. She states that she has a current temperature of 100.7 and is flushed. She is thinking that she has something else going on other than side effects from the chemo and wanted to discuss with Dr. Hemphill.

## 2024-12-18 ENCOUNTER — TELEPHONE (OUTPATIENT)
Dept: ONCOLOGY | Facility: CLINIC | Age: 64
End: 2024-12-18
Payer: COMMERCIAL

## 2024-12-18 NOTE — TELEPHONE ENCOUNTER
Called pt to see how she is doing today. States she had spiked a fever again around 4am and took tylenol with relief. She is feeling much better this morning than she was yesterday and was appreciative of the call. Advised her to call me back if she develops any further concerning symptoms.

## 2024-12-19 ENCOUNTER — INFUSION (OUTPATIENT)
Dept: ONCOLOGY | Facility: HOSPITAL | Age: 64
End: 2024-12-19
Payer: COMMERCIAL

## 2024-12-19 ENCOUNTER — OFFICE VISIT (OUTPATIENT)
Dept: ONCOLOGY | Facility: CLINIC | Age: 64
End: 2024-12-19
Payer: COMMERCIAL

## 2024-12-19 VITALS
DIASTOLIC BLOOD PRESSURE: 81 MMHG | WEIGHT: 165.9 LBS | OXYGEN SATURATION: 96 % | HEART RATE: 90 BPM | HEIGHT: 64 IN | TEMPERATURE: 98.1 F | RESPIRATION RATE: 16 BRPM | BODY MASS INDEX: 28.32 KG/M2 | SYSTOLIC BLOOD PRESSURE: 126 MMHG

## 2024-12-19 DIAGNOSIS — Z17.0 MALIGNANT NEOPLASM OF LEFT BREAST IN FEMALE, ESTROGEN RECEPTOR POSITIVE, UNSPECIFIED SITE OF BREAST: Primary | ICD-10-CM

## 2024-12-19 DIAGNOSIS — C50.912 MALIGNANT NEOPLASM OF LEFT BREAST IN FEMALE, ESTROGEN RECEPTOR POSITIVE, UNSPECIFIED SITE OF BREAST: Primary | ICD-10-CM

## 2024-12-19 DIAGNOSIS — Z17.0 MALIGNANT NEOPLASM OF LEFT BREAST IN FEMALE, ESTROGEN RECEPTOR POSITIVE, UNSPECIFIED SITE OF BREAST: ICD-10-CM

## 2024-12-19 DIAGNOSIS — C50.912 MALIGNANT NEOPLASM OF LEFT BREAST IN FEMALE, ESTROGEN RECEPTOR POSITIVE, UNSPECIFIED SITE OF BREAST: ICD-10-CM

## 2024-12-19 LAB
ALBUMIN SERPL-MCNC: 4 G/DL (ref 3.5–5.2)
ALBUMIN/GLOB SERPL: 1.5 G/DL
ALP SERPL-CCNC: 103 U/L (ref 39–117)
ALT SERPL W P-5'-P-CCNC: 65 U/L (ref 1–33)
ANION GAP SERPL CALCULATED.3IONS-SCNC: 9.6 MMOL/L (ref 5–15)
AST SERPL-CCNC: 41 U/L (ref 1–32)
BASOPHILS # BLD AUTO: 0.02 10*3/MM3 (ref 0–0.2)
BASOPHILS NFR BLD AUTO: 0.5 % (ref 0–1.5)
BILIRUB SERPL-MCNC: 0.3 MG/DL (ref 0–1.2)
BUN SERPL-MCNC: 11 MG/DL (ref 8–23)
BUN/CREAT SERPL: 13.9 (ref 7–25)
CALCIUM SPEC-SCNC: 9.2 MG/DL (ref 8.6–10.5)
CHLORIDE SERPL-SCNC: 101 MMOL/L (ref 98–107)
CO2 SERPL-SCNC: 26.4 MMOL/L (ref 22–29)
CREAT SERPL-MCNC: 0.79 MG/DL (ref 0.57–1)
DEPRECATED RDW RBC AUTO: 40.2 FL (ref 37–54)
EGFRCR SERPLBLD CKD-EPI 2021: 84.7 ML/MIN/1.73
EOSINOPHIL # BLD AUTO: 0.06 10*3/MM3 (ref 0–0.4)
EOSINOPHIL NFR BLD AUTO: 1.5 % (ref 0.3–6.2)
ERYTHROCYTE [DISTWIDTH] IN BLOOD BY AUTOMATED COUNT: 12.6 % (ref 12.3–15.4)
GLOBULIN UR ELPH-MCNC: 2.6 GM/DL
GLUCOSE SERPL-MCNC: 105 MG/DL (ref 65–99)
HCT VFR BLD AUTO: 41.2 % (ref 34–46.6)
HGB BLD-MCNC: 13.9 G/DL (ref 12–15.9)
IMM GRANULOCYTES # BLD AUTO: 0.07 10*3/MM3 (ref 0–0.05)
IMM GRANULOCYTES NFR BLD AUTO: 1.8 % (ref 0–0.5)
LYMPHOCYTES # BLD AUTO: 1.78 10*3/MM3 (ref 0.7–3.1)
LYMPHOCYTES NFR BLD AUTO: 45.8 % (ref 19.6–45.3)
MCH RBC QN AUTO: 29.4 PG (ref 26.6–33)
MCHC RBC AUTO-ENTMCNC: 33.7 G/DL (ref 31.5–35.7)
MCV RBC AUTO: 87.3 FL (ref 79–97)
MONOCYTES # BLD AUTO: 0.91 10*3/MM3 (ref 0.1–0.9)
MONOCYTES NFR BLD AUTO: 23.4 % (ref 5–12)
NEUTROPHILS NFR BLD AUTO: 1.05 10*3/MM3 (ref 1.7–7)
NEUTROPHILS NFR BLD AUTO: 27 % (ref 42.7–76)
NRBC BLD AUTO-RTO: 0 /100 WBC (ref 0–0.2)
PLATELET # BLD AUTO: 202 10*3/MM3 (ref 140–450)
PMV BLD AUTO: 9.7 FL (ref 6–12)
POTASSIUM SERPL-SCNC: 4.3 MMOL/L (ref 3.5–5.2)
PROT SERPL-MCNC: 6.6 G/DL (ref 6–8.5)
RBC # BLD AUTO: 4.72 10*6/MM3 (ref 3.77–5.28)
SODIUM SERPL-SCNC: 137 MMOL/L (ref 136–145)
WBC NRBC COR # BLD AUTO: 3.89 10*3/MM3 (ref 3.4–10.8)

## 2024-12-19 PROCEDURE — G0463 HOSPITAL OUTPT CLINIC VISIT: HCPCS

## 2024-12-19 PROCEDURE — 80053 COMPREHEN METABOLIC PANEL: CPT | Performed by: INTERNAL MEDICINE

## 2024-12-19 PROCEDURE — 85025 COMPLETE CBC W/AUTO DIFF WBC: CPT

## 2024-12-19 PROCEDURE — 36415 COLL VENOUS BLD VENIPUNCTURE: CPT | Performed by: INTERNAL MEDICINE

## 2024-12-19 NOTE — PROGRESS NOTES
Subjective     REASON FOR CONSULTATION:  pT1cN0 grade 3 ER/[place]+ her 2 neg left breast ca invasive ductal post mastectomy and SLNB   Provide an opinion on any further workup or treatment                             REQUESTING PHYSICIAN: Stacy SESAY    History of Present Illness patient is a 64-year-old lady with a high risk node-negative breast cancer grade 3 with a high Oncotype score after her first dose of  adjuvant therapy with Cytoxan Taxotere for 4 cycles    She did well with her treatment until Sunday night when she started getting achy and then became very achy and has severe bone pains on Monday and Tuesday called in and told her to take her hydrocodone which helped with the pain but she still had a moderate fever 101 for 3 days and is finally you slow down.  She thought she may have had a cold and swab for COVID which was negative but it is not clear that this is all from the chemotherapy and she may indeed have had an infection coincidentally because of the fever which is unusual    Otherwise she had no nausea vomiting neuropathy mouth sores or diarrhea  CBC today is adequate with an ANC of just over thousand    She has no new questions about her chemotherapy but reports numbness in the lateral right thigh for the last 7 days with no obvious trigger.  She reports 20 years ago she had the same problem in her left thigh saw a neurologist then we did not think there was much going on and we will set her up to see neurology at some point again because of her dysgeusia and parosmia plus neuropathy    Past Medical History:   Diagnosis Date    Breast cancer     LEFT BREAST    Hyperlipidemia     Hypotension     PONV (postoperative nausea and vomiting)     SEVERE    Syncope     Thyroid nodule     Ulcerative colitis         Past Surgical History:   Procedure Laterality Date    BREAST BIOPSY Left 2024    BREAST LUMPECTOMY Bilateral     BREAST  RECONSTRUCTION Bilateral 10/31/2024    Procedure: BILATERAL GOLDILOCKS RECONSTRUCTION BILATERAL  WITH TISSUE EXPANDER PLACEMENT;  Surgeon: Adan Mayorga MD;  Location: [facility] OR [facility] [place];  Service: Plastics;  Laterality: Bilateral;    CARPAL TUNNEL RELEASE Left     CARPAL TUNNEL RELEASE Right 2023    COLONOSCOPY N/A 2014    [facility]    HX OVARIAN CYSTECTOMY      HYSTERECTOMY      INCISION AND DRAINAGE OF WOUND  2015    KNEE ARTHROSCOPY Right 2022    MASTECTOMY W/ SENTINEL NODE BIOPSY Left 10/31/2024    Procedure: bilateral skin sparing mastectomy, left sentinel lymph node biopsy;  Surgeon: Stacy Edouard MD;  Location: [facility] OR [facility] [place];  Service: General;  Laterality: Left;    THYROIDECTOMY, PARTIAL  2002   Oncologic history  patient is a 64-year-old postmenopausal female with hypothyroidism and hypercholesterolemia was noted on her most recent screening mammogram to have an abnormality in the left breast at 9:00 that showed invasive mammary carcinoma grade 3 measuring 9 mm with associated DCIS high-grade ER 91 to 100% DE 40% HER2 negative with a Ki-67 of 35%     patient was referred to Dr. Edouard underwent breast MRI Which showed a 1.6 x 1.5 cm irregular mass at the site of the biopsy and posteriorly there was additional 1.8 x 1.5 cm non-mass enhancement which was suspicious with no axillary or internal mammary adenopathy.  In addition the right breast showed a non-mass enhancement at 9:00 which was considered suspicious.  Patient underwent biopsy of the right breast lesion which thankfully showed PASH and was taken to surgery on 10/31/2024 and underwent bilateral mastectomy with left sentinel node biopsy with the finding of a residual 16 mm grade 3 invasive ductal carcinoma with associated DCIS measuring 20 mm with clear margins and 4 negative sentinel nodes patient had immediate reconstruction.    Oncotype DX was sent to score returned at 13 and of note the DE done by RT-PCR was negative but the ER was  positive    Patient is here to discuss adjuvant treatment is doing well postsurgery but reports unusual smell and taste since surgery with no signs of COVID otherwise    She is  2 para 1 with a miscarriage first childbirth was age 22 she breast-fed for 9 months  Menarche was at age 17 she had a hysterectomy at age 54 and probable menopause a few years later because her ovaries were not removed.  She has had no postmenopausal hormone replacement    Family history is positive for maternal aunt with breast cancer in her 40s she had a second cancer in her 70s and  of metastatic breast cancer.  Paternal grandmother had breast cancer in her 60s her 9 gene [employer] panel was negative and we have reflexed to a 48 gene panel    She is not a smoker drinks 2 mixed drinks a week  She has never had a DVT MI or stroke   She has never had a DEXA scan    Discussed the prognostic and predictive value of the Oncotype DX score which indicates benefit from chemotherapy which is not unexpected in a grade 3 tumor which appears to be more luminal B in nature.    We discussed the use of chemotherapy with Cytoxan Taxotere for 4 cycles using a PICC line.  We talked about the [place] cooling system for limiting hair loss and she is agreeable to all the above.    We will have chemo education and PICC line and see her back in 2 weeks to start her first round of chemotherapy and she knows she will take at least 5 years of hormonal blockade following chemotherapy with no plans for radiation at this time  We will review the results of her extended genetic panel with her when she returns  Current Outpatient Medications on File Prior to Visit   Medication Sig Dispense Refill    ALPRAZolam (Xanax) 0.25 MG tablet Take 1 tablet by mouth 2 (Two) Times a Day As Needed for Anxiety. 20 tablet 0    dexAMETHasone (DECADRON) 4 MG tablet Take 2 tablets oral twice a day for 3 consecutive days beginning the day before chemotherapy and continue for 6  doses. 12 tablet 3    HYDROcodone-acetaminophen (NORCO) 5-325 MG per tablet Take 1 tablet by mouth Every 6 (Six) Hours As Needed for Moderate Pain. 15 tablet 0    levothyroxine (SYNTHROID, LEVOTHROID) 25 MCG tablet Take 1 tablet by mouth Daily.      multivitamin (MULTIVITAMIN PO) Take 1 tablet by mouth Daily. HOLD FOR SURGERY      ondansetron (ZOFRAN) 8 MG tablet Take 1 tablet by mouth 3 (Three) Times a Day As Needed for Nausea or Vomiting. 30 tablet 5    rosuvastatin (CRESTOR) 20 MG tablet Take 1 tablet by mouth Daily.      VITAMIN D PO Take 1 tablet by mouth Daily.      gabapentin (NEURONTIN) 300 MG capsule Take 1 capsule by mouth Every 8 (Eight) Hours for 10 days. 30 capsule 0     No current facility-administered medications on file prior to visit.        ALLERGIES:    Allergies   Allergen Reactions    Tetanus Toxoid Hives    Penicillin G Nausea And Vomiting     Beta lactam allergy details  Antibiotic reaction:  (n/v)  Age at reaction: adult  Dose to reaction time: unknown  Reason for antibiotic: unknown  Epinephrine required for reaction?: unknown  Tolerated antibiotics: unknown   Beta lactam allergy details  Antibiotic reaction: rash  Age at reaction: adult  Dose to reaction time: unknown  Reason for antibiotic: unknown  Epinephrine required for reaction?: no  Tolerated antibiotics: amoxicillin, augmentin            Social History     Socioeconomic History    Marital status:      Spouse name: Cheo   Tobacco Use    Smoking status: Never    Smokeless tobacco: Never   Vaping Use    Vaping status: Never Used   Substance and Sexual Activity    Alcohol use: Yes     Alcohol/week: 1.0 standard drink of alcohol     Types: 1 Glasses of wine per week     Comment: occasionally    Drug use: Never    Sexual activity: Yes     Partners: Male     Birth control/protection: Post-menopausal, Hysterectomy        Family History   Problem Relation Age of Onset    Breast cancer Maternal Aunt 40 - 49        diagnosed in her 40s  "and mid to late 70s    Diabetes Mother     Arthritis Father     Cancer Father     Diabetes Son     [place] Hyperthermia Neg Hx         Review of Systems   HENT:          Change of smell and taste since breast surgery   All other systems reviewed and are negative.       Objective     Vitals:    12/19/24 0849   BP: 126/81   Pulse: 90   Resp: 16   Temp: 98.1 °F (36.7 °C)   TempSrc: Oral   SpO2: 96%   Weight: 75.3 kg (165 lb 14.4 oz)   Height: 162.6 cm (64.02\")   PainSc: 0-No pain         12/19/2024     8:48 AM   Current Status   ECOG score 0       Physical Exam    CONSTITUTIONAL:  Vital signs reviewed.  No distress, looks comfortable.  EYES:  Conjunctivae and lids unremarkable.  PERRLA  EARS,NOSE,MOUTH,THROAT:  Ears and nose appear unremarkable.  Lips, teeth, gums appear unremarkable.  RESPIRATORY:  Normal respiratory effort.  Lungs clear to auscultation bilaterally.  BREASTS: Bilateral mastectomy with expanders in place small areas of wound dehiscence noted with no infection  CARDIOVASCULAR:  Normal S1, S2.  No murmurs rubs or gallops.  No significant lower extremity edema.  GASTROINTESTINAL: Abdomen appears unremarkable.  Nontender.  No hepatomegaly.  No splenomegaly.  LYMPHATIC:  No cervical, supraclavicular, axillary lymphadenopathy.  SKIN:  Warm.  No rashes.  PSYCHIATRIC:  Normal judgment and insight.  Normal mood and affect.      RECENT LABS:  Hematology WBC   Date Value Ref Range Status   12/19/2024 3.89 3.40 - 10.80 10*3/mm3 Final     RBC   Date Value Ref Range Status   12/19/2024 4.72 3.77 - 5.28 10*6/mm3 Final     Hemoglobin   Date Value Ref Range Status   12/19/2024 13.9 12.0 - 15.9 g/dL Final     Hematocrit   Date Value Ref Range Status   12/19/2024 41.2 34.0 - 46.6 % Final     Platelets   Date Value Ref Range Status   12/19/2024 202 140 - 450 10*3/mm3 Final          Final Diagnosis  1. Breast, left 9 o'clock position AR edge, core biopsy: (Vision clip)  A. Invasive mammary carcinoma, no special type " (ductal), [place] histologic grade 3 (tubules = 3, nuclei = 3,  mitoses = 2), measuring 9 mm maximally, and involving approximately 9 core fragments.  B. Minimal associated ductal carcinoma in situ (DCIS) high nuclear grade with solid and cribriform architecture  and spotty single cell necrosis.  C. No definitive lymphovascular space invasion identified.  Electronically signed by Asia Cam MD on 8/30/2024 at 1550  .Synoptic Checklist  Breast Biomarker Reporting Template (BREAST BIOMARKER REPORTING TEMPLATE - All Specimens) Protocol posted: 12/13/2023  Test(s) Performed  Estrogen Receptor (ER) Status Positive (greater than 10% of cells demonstrate nuclear positivity)  Percentage of Cells with Nuclear Positivity %  Average Intensity of Staining Strong  Test Type Food and Drug Administration (FDA) cleared (test / vendor): [place]  Primary Antibody SP1  Test(s) Performed  Progesterone Receptor (PgR) Status Positive  Percentage of Cells with Nuclear Positivity 31-40%  Average Intensity of Staining Moderate  Test Type Food and Drug Administration (FDA) cleared (test / vendor): [place]  Primary Antibody 1E2  Test(s) Performed  HER2 by Immunohistochemistry Negative (Score 1+)  Test Type Food and Drug Administration (FDA) cleared (test / vendor): [place]  Primary Antibody 4B5  Test(s) Performed Ki-67  Ki-67 Percentage of Positive Nuclei 35 %    Final Diagnosis 10/18  1. Breast, right, 9:00, MRI, biopsy (buckle shaped clip): Benign breast tissue with  -A. Fibroadenomatoid hyperplasia  -B. Pseudoangiomatous stromal hyperplasia of the mammary stroma  -C. Microcalcifications in nonneoplastic tissue  -D. [place] ductal hyperplasia of the usual type  -E. Columnar cell change      Synoptic Checklist 10/31/24  INVASIVE CARCINOMA OF THE BREAST: Resection (INVASIVE CARCINOMA OF THE BREAST: RESECTION - All Specimens) 8th Edition  - Protocol posted: 6/19/2024  SPECIMEN  Procedure Total mastectomy  Specimen Laterality  "Left  .  TUMOR  Histologic Type Invasive carcinoma of no special type (ductal)  Histologic Grade ([place] Histologic Score)  Glandular (Acinar) / Tubular Differentiation Score 3  Nuclear Pleomorphism Score 3  Mitotic Rate Score 3  Overall Grade Grade 3 (scores of 8 or 9)  Tumor Size Greatest dimension of largest invasive focus (Millimeters): 16 mm  Tumor Focality Single focus of invasive carcinoma  Ductal Carcinoma In Situ (DCIS) Present  Negative for extensive intraductal component (EIC)  Size (Extent) of DCIS Estimated size (extent) of DCIS is at least (Millimeters): 20 mm  Architectural Patterns Cribriform  Solid  Nuclear Grade Grade III (high)  Necrosis Present, central (expansive \"comedo\" necrosis)  Lymphatic and / or Vascular Invasion Not identified  Dermal Lymphatic and / or Vascular Invasion Not identified  Microcalcifications Present in non-neoplastic tissue  Treatment Effect in the Breast No known presurgical therapy  .  MARGINS  Margin Status for Invasive Carcinoma All margins negative for invasive carcinoma  Distance from Invasive Carcinoma to Closest Margin Greater than: 20 mm  Distance from Invasive Carcinoma to Anterior Margin Greater than: 20 mm  Distance from Invasive Carcinoma to Posterior Margin Greater than: 20 mm  Margin Status for DCIS All margins negative for DCIS  Distance from DCIS to Closest Margin    Closest Margin(s) to DCIS Anterior  Distance from DCIS to Anterior Margin 4 mm  Distance from DCIS to Posterior Margin Greater than: 20 mm  .  REGIONAL LYMPH NODES  Regional Lymph Node Status All regional lymph nodes negative for tumor  Total Number of Lymph Nodes Examined (sentinel and non-sentinel) 4  Number of [place] Nodes Examined 4  .  pTNM CLASSIFICATION (AJCC 8th Edition)  Reporting of pT, pN, and (when applicable) pM categories is based on information available to the pathologist at the time the report is issued. As  per the AJCC (Chapter 1, 8th Ed.) it is the managing physician's " responsibility to establish the final pathologic stage based upon all pertinent  information, including but potentially not limited to this pathology report.  pT Category pT1c  pN Category pN0  N Suffix (sn)  .  SPECIAL STUDIES  Estrogen Receptor (ER) Status Positive (greater than 10% of cells demonstrate nuclear positivity)  Percentage of Cells with Nuclear Positivity %  Progesterone Receptor (PgR) Status Positive  Percentage of Cells with Nuclear Positivity 31-40%  HER2 (by immunohistochemistry) Negative (Score 1+)  Ki-67 Percentage of Positive Nuclei 35 %    Assessment & Plan   1.pT1cN0 grade 3 invasive ductal carcinoma left breast ER/MT positive HER2 negative with a Ki-67 of 35% mastectomy and sentinel node biopsy with clear margins 10/31/2024  Oncotype DX score of 33  TCx4 to start on 12/13/2024 with Neulasta support via PICC line  Had fever and significant bone pains presumably from Neulasta on body although she could have had a coincidental viral infection though COVID-negative  Consider switching to alternative growth factor next cycle    2.  Positive family history for breast cancer in a paternal aunt and grandmother 9 gene panel -48 gene panel neg    3.hypercholesterolemia on treatment    4.  Hypothyroidism on treatment    5.  Recent onset parosmia and dysgeusia since surgery?  Related to anesthesia versus asymptomatic COVID  1 week history of meralgia paresthetica right thigh?  Etiology had similar symptoms in the left thigh 20 years ago-May need to see neurology again  6.  Mild elevations of LFTs predating chemo-watch for now    Plan  1.  PICC line flush and CBC in 1 week  2 see me in 2 weeks for cycle 2 TC    I spent 40 total minutes, face-to-face, caring for Fátima today. Greater than 50% of this time involved counseling and/or coordination of care as documented within this note.

## 2024-12-26 ENCOUNTER — INFUSION (OUTPATIENT)
Dept: ONCOLOGY | Facility: HOSPITAL | Age: 64
End: 2024-12-26
Payer: COMMERCIAL

## 2024-12-26 PROCEDURE — G0463 HOSPITAL OUTPT CLINIC VISIT: HCPCS

## 2025-01-03 ENCOUNTER — OFFICE VISIT (OUTPATIENT)
Dept: ONCOLOGY | Facility: CLINIC | Age: 65
End: 2025-01-03
Payer: COMMERCIAL

## 2025-01-03 ENCOUNTER — INFUSION (OUTPATIENT)
Dept: ONCOLOGY | Facility: HOSPITAL | Age: 65
End: 2025-01-03
Payer: COMMERCIAL

## 2025-01-03 ENCOUNTER — CLINICAL SUPPORT (OUTPATIENT)
Dept: OTHER | Facility: HOSPITAL | Age: 65
End: 2025-01-03
Payer: COMMERCIAL

## 2025-01-03 VITALS
HEART RATE: 87 BPM | SYSTOLIC BLOOD PRESSURE: 104 MMHG | OXYGEN SATURATION: 96 % | DIASTOLIC BLOOD PRESSURE: 66 MMHG | WEIGHT: 171 LBS | HEIGHT: 64 IN | BODY MASS INDEX: 29.19 KG/M2 | TEMPERATURE: 98 F

## 2025-01-03 DIAGNOSIS — Z17.0 MALIGNANT NEOPLASM OF LEFT BREAST IN FEMALE, ESTROGEN RECEPTOR POSITIVE, UNSPECIFIED SITE OF BREAST: Primary | ICD-10-CM

## 2025-01-03 DIAGNOSIS — C50.919 MALIGNANT NEOPLASM OF FEMALE BREAST, UNSPECIFIED ESTROGEN RECEPTOR STATUS, UNSPECIFIED LATERALITY, UNSPECIFIED SITE OF BREAST: ICD-10-CM

## 2025-01-03 DIAGNOSIS — C50.912 MALIGNANT NEOPLASM OF LEFT BREAST IN FEMALE, ESTROGEN RECEPTOR POSITIVE, UNSPECIFIED SITE OF BREAST: Primary | ICD-10-CM

## 2025-01-03 DIAGNOSIS — Z17.0 MALIGNANT NEOPLASM OF LEFT BREAST IN FEMALE, ESTROGEN RECEPTOR POSITIVE, UNSPECIFIED SITE OF BREAST: ICD-10-CM

## 2025-01-03 DIAGNOSIS — C50.912 MALIGNANT NEOPLASM OF LEFT BREAST IN FEMALE, ESTROGEN RECEPTOR POSITIVE, UNSPECIFIED SITE OF BREAST: ICD-10-CM

## 2025-01-03 LAB
ALBUMIN SERPL-MCNC: 4.2 G/DL (ref 3.5–5.2)
ALBUMIN/GLOB SERPL: 1.7 G/DL
ALP SERPL-CCNC: 89 U/L (ref 39–117)
ALT SERPL W P-5'-P-CCNC: 63 U/L (ref 1–33)
ANION GAP SERPL CALCULATED.3IONS-SCNC: 11.3 MMOL/L (ref 5–15)
AST SERPL-CCNC: 35 U/L (ref 1–32)
BASOPHILS # BLD AUTO: 0.02 10*3/MM3 (ref 0–0.2)
BASOPHILS NFR BLD AUTO: 0.2 % (ref 0–1.5)
BILIRUB SERPL-MCNC: 0.2 MG/DL (ref 0–1.2)
BUN SERPL-MCNC: 13 MG/DL (ref 8–23)
BUN/CREAT SERPL: 21.7 (ref 7–25)
CALCIUM SPEC-SCNC: 9.6 MG/DL (ref 8.6–10.5)
CHLORIDE SERPL-SCNC: 106 MMOL/L (ref 98–107)
CO2 SERPL-SCNC: 22.7 MMOL/L (ref 22–29)
CREAT SERPL-MCNC: 0.6 MG/DL (ref 0.57–1)
DEPRECATED RDW RBC AUTO: 43 FL (ref 37–54)
EGFRCR SERPLBLD CKD-EPI 2021: 101.6 ML/MIN/1.73
EOSINOPHIL # BLD AUTO: 0.01 10*3/MM3 (ref 0–0.4)
EOSINOPHIL NFR BLD AUTO: 0.1 % (ref 0.3–6.2)
ERYTHROCYTE [DISTWIDTH] IN BLOOD BY AUTOMATED COUNT: 13.6 % (ref 12.3–15.4)
GLOBULIN UR ELPH-MCNC: 2.5 GM/DL
GLUCOSE SERPL-MCNC: 95 MG/DL (ref 65–99)
HCT VFR BLD AUTO: 37.8 % (ref 34–46.6)
HGB BLD-MCNC: 12.8 G/DL (ref 12–15.9)
IMM GRANULOCYTES # BLD AUTO: 0.04 10*3/MM3 (ref 0–0.05)
IMM GRANULOCYTES NFR BLD AUTO: 0.4 % (ref 0–0.5)
LYMPHOCYTES # BLD AUTO: 0.99 10*3/MM3 (ref 0.7–3.1)
LYMPHOCYTES NFR BLD AUTO: 9.2 % (ref 19.6–45.3)
MCH RBC QN AUTO: 29.4 PG (ref 26.6–33)
MCHC RBC AUTO-ENTMCNC: 33.9 G/DL (ref 31.5–35.7)
MCV RBC AUTO: 86.9 FL (ref 79–97)
MONOCYTES # BLD AUTO: 0.6 10*3/MM3 (ref 0.1–0.9)
MONOCYTES NFR BLD AUTO: 5.6 % (ref 5–12)
NEUTROPHILS NFR BLD AUTO: 84.5 % (ref 42.7–76)
NEUTROPHILS NFR BLD AUTO: 9.1 10*3/MM3 (ref 1.7–7)
NRBC BLD AUTO-RTO: 0 /100 WBC (ref 0–0.2)
PLATELET # BLD AUTO: 479 10*3/MM3 (ref 140–450)
PMV BLD AUTO: 8.9 FL (ref 6–12)
POTASSIUM SERPL-SCNC: 4.3 MMOL/L (ref 3.5–5.2)
PROT SERPL-MCNC: 6.7 G/DL (ref 6–8.5)
RBC # BLD AUTO: 4.35 10*6/MM3 (ref 3.77–5.28)
SODIUM SERPL-SCNC: 140 MMOL/L (ref 136–145)
WBC NRBC COR # BLD AUTO: 10.76 10*3/MM3 (ref 3.4–10.8)

## 2025-01-03 PROCEDURE — 96375 TX/PRO/DX INJ NEW DRUG ADDON: CPT

## 2025-01-03 PROCEDURE — 25010000002 DIPHENHYDRAMINE PER 50 MG: Performed by: INTERNAL MEDICINE

## 2025-01-03 PROCEDURE — 25010000002 CYCLOPHOSPHAMIDE 2 GM/10ML SOLUTION 10 ML VIAL: Performed by: INTERNAL MEDICINE

## 2025-01-03 PROCEDURE — 25010000002 PEGFILGRASTIM-CBQV 6 MG/0.6ML SOLUTION PREFILLED SYRINGE: Performed by: INTERNAL MEDICINE

## 2025-01-03 PROCEDURE — 25810000003 SODIUM CHLORIDE 0.9 % SOLUTION 250 ML FLEX CONT: Performed by: INTERNAL MEDICINE

## 2025-01-03 PROCEDURE — 96377 APPLICATON ON-BODY INJECTOR: CPT

## 2025-01-03 PROCEDURE — 85025 COMPLETE CBC W/AUTO DIFF WBC: CPT

## 2025-01-03 PROCEDURE — 96417 CHEMO IV INFUS EACH ADDL SEQ: CPT

## 2025-01-03 PROCEDURE — 25010000002 DOCETAXEL 20 MG/ML SOLUTION 8 ML VIAL: Performed by: INTERNAL MEDICINE

## 2025-01-03 PROCEDURE — 25010000002 PALONOSETRON PER 25 MCG: Performed by: INTERNAL MEDICINE

## 2025-01-03 PROCEDURE — 96413 CHEMO IV INFUSION 1 HR: CPT

## 2025-01-03 PROCEDURE — 80053 COMPREHEN METABOLIC PANEL: CPT

## 2025-01-03 RX ORDER — PALONOSETRON 0.05 MG/ML
0.25 INJECTION, SOLUTION INTRAVENOUS ONCE
Status: CANCELLED | OUTPATIENT
Start: 2025-01-03

## 2025-01-03 RX ORDER — FAMOTIDINE 10 MG/ML
20 INJECTION, SOLUTION INTRAVENOUS ONCE
Status: CANCELLED | OUTPATIENT
Start: 2025-01-03

## 2025-01-03 RX ORDER — LACTOBACILLUS RHAMNOSUS GG 10B CELL
1 CAPSULE ORAL DAILY
COMMUNITY

## 2025-01-03 RX ORDER — FAMOTIDINE 10 MG/ML
20 INJECTION, SOLUTION INTRAVENOUS ONCE
Status: COMPLETED | OUTPATIENT
Start: 2025-01-03 | End: 2025-01-03

## 2025-01-03 RX ORDER — FAMOTIDINE 10 MG/ML
20 INJECTION, SOLUTION INTRAVENOUS AS NEEDED
Status: CANCELLED | OUTPATIENT
Start: 2025-01-03

## 2025-01-03 RX ORDER — DIPHENHYDRAMINE HYDROCHLORIDE 50 MG/ML
25 INJECTION INTRAMUSCULAR; INTRAVENOUS ONCE
Status: COMPLETED | OUTPATIENT
Start: 2025-01-03 | End: 2025-01-03

## 2025-01-03 RX ORDER — GABAPENTIN 300 MG/1
600 CAPSULE ORAL NIGHTLY
Qty: 60 CAPSULE | Refills: 1 | Status: SHIPPED | OUTPATIENT
Start: 2025-01-03

## 2025-01-03 RX ORDER — PALONOSETRON 0.05 MG/ML
0.25 INJECTION, SOLUTION INTRAVENOUS ONCE
Status: COMPLETED | OUTPATIENT
Start: 2025-01-03 | End: 2025-01-03

## 2025-01-03 RX ORDER — HYDROCORTISONE SODIUM SUCCINATE 100 MG/2ML
100 INJECTION INTRAMUSCULAR; INTRAVENOUS AS NEEDED
Status: CANCELLED | OUTPATIENT
Start: 2025-01-03

## 2025-01-03 RX ORDER — SODIUM CHLORIDE 9 MG/ML
500 INJECTION, SOLUTION INTRAVENOUS ONCE
Start: 2025-01-06 | End: 2025-01-06

## 2025-01-03 RX ORDER — DIPHENHYDRAMINE HYDROCHLORIDE 50 MG/ML
50 INJECTION INTRAMUSCULAR; INTRAVENOUS AS NEEDED
Status: CANCELLED | OUTPATIENT
Start: 2025-01-03

## 2025-01-03 RX ORDER — SODIUM CHLORIDE 9 MG/ML
20 INJECTION, SOLUTION INTRAVENOUS ONCE
Status: CANCELLED | OUTPATIENT
Start: 2025-01-03

## 2025-01-03 RX ADMIN — PALONOSETRON HYDROCHLORIDE 0.25 MG: 0.25 INJECTION INTRAVENOUS at 10:48

## 2025-01-03 RX ADMIN — CYCLOPHOSPHAMIDE 1090 MG: 200 INJECTION, SOLUTION INTRAVENOUS at 13:04

## 2025-01-03 RX ADMIN — FAMOTIDINE 20 MG: 10 INJECTION INTRAVENOUS at 10:48

## 2025-01-03 RX ADMIN — DIPHENHYDRAMINE HYDROCHLORIDE 25 MG: 50 INJECTION, SOLUTION INTRAMUSCULAR; INTRAVENOUS at 10:48

## 2025-01-03 RX ADMIN — PEGFILGRASTIM-CBQV 6 MG: 6 INJECTION, SOLUTION SUBCUTANEOUS at 14:39

## 2025-01-03 RX ADMIN — DOCETAXEL 135 MG: 20 INJECTION, SOLUTION, CONCENTRATE INTRAVENOUS at 12:00

## 2025-01-03 NOTE — PATIENT INSTRUCTIONS
Fide Onbody Injector:    Will inject and deliver the dose 27 hours after its applied  The dose will deliver over 5 mins and when delviery is complete it will make a long beep sound and turn solid green as well as retract the needle  Flashing green is good! It will turn solid with the delivery is complete  No... xrays, body lotions or creams, sunlight     You can shower but do not submerge. Just be careful not to get it too wet

## 2025-01-03 NOTE — PROGRESS NOTES
Subjective     REASON FOR CONSULTATION:  pT1cN0 grade 3 ER/ID+ her 2 neg left breast ca invasive ductal post mastectomy and SLNB   Provide an opinion on any further workup or treatment                             REQUESTING PHYSICIAN: Stacy SESAY    History of Present Illness patient is a 64-year-old lady with a high risk node-negative breast cancer grade 3 with a high Oncotype score after her first dose of  adjuvant therapy with Cytoxan Taxotere for 4 cycles    She did well with her first treatment until Sunday night when she started getting achy and then became very achy and has severe bone pains on Monday and Tuesday called in and told her to take her hydrocodone which helped with the pain but she still had a moderate fever 101 for 3 days and is finally you slow down.  She thought she may have had a cold and swab for COVID which was negative but it is not clear that this is all from the chemotherapy and she may indeed have had an infection coincidentally because of the fever which is unusual    Otherwise she had no nausea vomiting neuropathy mouth sores or diarrhea  CBC today is adequate and she is back to normal    She has no new questions about her chemotherapy but reports numbness in the lateral right thigh for the last 7 days with no obvious trigger.  She reports 20 years ago she had the same problem in her left thigh saw a neurologist then we did not think there was much going on and we will set her up to see neurology at some point again because of her dysgeusia and parosmia plus neuropathy-she is taking gabapentin 600 at bedtime and this is helping with the numbness    She is here to start cycle #2 and overall I think she may have had a virus with the first cycle and so we will proceed with the same treatment and see how she does.  Her liver functions are stable and her counts are all good to    Past Medical History:   Diagnosis Date     Breast cancer     LEFT BREAST    Hyperlipidemia     Hypotension     PONV (postoperative nausea and vomiting)     SEVERE    Syncope     Thyroid nodule     Ulcerative colitis         Past Surgical History:   Procedure Laterality Date    BREAST BIOPSY Left 2024    BREAST LUMPECTOMY Bilateral     BREAST RECONSTRUCTION Bilateral 10/31/2024    Procedure: BILATERAL GOLDILOCKS RECONSTRUCTION BILATERAL  WITH TISSUE EXPANDER PLACEMENT;  Surgeon: Adan Mayorga MD;  [place] [facility] [place] [facility] [place]: [place];  Laterality: Bilateral;    CARPAL TUNNEL RELEASE Left     CARPAL TUNNEL RELEASE Right 2023    COLONOSCOPY N/A 2014    [facility] OVARIAN CYSTECTOMY      HYSTERECTOMY      INCISION AND DRAINAGE OF WOUND  2015    KNEE ARTHROSCOPY Right 2022    MASTECTOMY W/ SENTINEL NODE BIOPSY Left 10/31/2024    Procedure: bilateral skin sparing mastectomy, left sentinel lymph node biopsy;  Surgeon: Stacy Edouard MD;  [place] [facility] [place] [facility] [place]  Service: General;  Laterality: Left;    THYROIDECTOMY, PARTIAL  2002   Oncologic history  patient is a 64-year-old postmenopausal female with hypothyroidism and hypercholesterolemia was noted on her most recent screening mammogram to have an abnormality in the left breast at 9:00 that showed invasive mammary carcinoma grade 3 measuring 9 mm with associated DCIS high-grade ER 91 to 100% MD 40% HER2 negative with a Ki-67 of 35%     patient was referred to Dr. Edouard underwent breast MRI Which showed a 1.6 x 1.5 cm irregular mass at the site of the biopsy and posteriorly there was additional 1.8 x 1.5 cm non-mass enhancement which was suspicious with no axillary or internal mammary adenopathy.  In addition the right breast showed a non-mass enhancement at 9:00 which was considered suspicious.  Patient underwent biopsy of the right breast lesion which thankfully showed PASH and was taken to surgery on 10/31/2024 and underwent bilateral mastectomy with left sentinel node biopsy  with the finding of a residual 16 mm grade 3 invasive ductal carcinoma with associated DCIS measuring 20 mm with clear margins and 4 negative sentinel nodes patient had immediate reconstruction.    Oncotype DX was sent to score returned at 13 and of note the WI done by RT-PCR was negative but the ER was positive    Patient is here to discuss adjuvant treatment is doing well postsurgery but reports unusual smell and taste since surgery with no signs of COVID otherwise    She is  2 para 1 with a miscarriage first childbirth was age 22 she breast-fed for 9 months  Menarche was at age 17 she had a hysterectomy at age 54 and probable menopause a few years later because her ovaries were not removed.  She has had no postmenopausal hormone replacement    Family history is positive for maternal aunt with breast cancer in her 40s she had a second cancer in her 70s and  of metastatic breast cancer.  Paternal grandmother had breast cancer in her 60s her 9 gene [employer] panel was negative and we have reflexed to a 48 gene panel    She is not a smoker drinks 2 mixed drinks a week  She has never had a DVT MI or stroke   She has never had a DEXA scan    Discussed the prognostic and predictive value of the Oncotype DX score which indicates benefit from chemotherapy which is not unexpected in a grade 3 tumor which appears to be more luminal B in nature.    We discussed the use of chemotherapy with Cytoxan Taxotere for 4 cycles using a PICC line.  We talked about the [place] cooling system for limiting hair loss and she is agreeable to all the above.    We will have chemo education and PICC line and see her back in 2 weeks to start her first round of chemotherapy and she knows she will take at least 5 years of hormonal blockade following chemotherapy with no plans for radiation at this time  We will review the results of her extended genetic panel with her when she returns  Current Outpatient Medications on File Prior to  Visit   Medication Sig Dispense Refill    ALPRAZolam (Xanax) 0.25 MG tablet Take 1 tablet by mouth 2 (Two) Times a Day As Needed for Anxiety. 20 tablet 0    HYDROcodone-acetaminophen (NORCO) 5-325 MG per tablet Take 1 tablet by mouth Every 6 (Six) Hours As Needed for Moderate Pain. 15 tablet 0    levothyroxine (SYNTHROID, LEVOTHROID) 25 MCG tablet Take 1 tablet by mouth Daily.      multivitamin (MULTIVITAMIN PO) Take 1 tablet by mouth Daily. HOLD FOR SURGERY      ondansetron (ZOFRAN) 8 MG tablet Take 1 tablet by mouth 3 (Three) Times a Day As Needed for Nausea or Vomiting. 30 tablet 5    probiotic (CULTURELLE) capsule capsule Take 1 capsule by mouth Daily.      rosuvastatin (CRESTOR) 20 MG tablet Take 1 tablet by mouth Daily.      VITAMIN D PO Take 1 tablet by mouth Daily.      [DISCONTINUED] gabapentin (NEURONTIN) 300 MG capsule Take 1 capsule by mouth Every 8 (Eight) Hours for 10 days. 30 capsule 0     No current facility-administered medications on file prior to visit.        ALLERGIES:    Allergies   Allergen Reactions    Tetanus Toxoid Hives    Penicillin G Nausea And Vomiting     Beta lactam allergy details  Antibiotic reaction:  (n/v)  Age at reaction: adult  Dose to reaction time: unknown  Reason for antibiotic: unknown  Epinephrine required for reaction?: unknown  Tolerated antibiotics: unknown   Beta lactam allergy details  Antibiotic reaction: rash  Age at reaction: adult  Dose to reaction time: unknown  Reason for antibiotic: unknown  Epinephrine required for reaction?: no  Tolerated antibiotics: amoxicillin, augmentin            Social History     Socioeconomic History    Marital status:      Spouse name: Cheo   Tobacco Use    Smoking status: Never    Smokeless tobacco: Never   Vaping Use    Vaping status: Never Used   Substance and Sexual Activity    Alcohol use: Yes     Alcohol/week: 1.0 standard drink of alcohol     Types: 1 Glasses of wine per week     Comment: occasionally    Drug use: Never  "   Sexual activity: Yes     Partners: Male     Birth control/protection: Post-menopausal, Hysterectomy        Family History   Problem Relation Age of Onset    Breast cancer Maternal Aunt 40 - 49        diagnosed in her 40s and mid to late 70s    Diabetes Mother     Arthritis Father     Cancer Father     Diabetes Son     Malig Hyperthermia Neg Hx         Review of Systems   HENT:          Change of smell and taste since breast surgery   All other systems reviewed and are negative.       Objective     Vitals:    01/03/25 0951   BP: 104/66   Pulse: 87   Temp: 98 °F (36.7 °C)   TempSrc: Oral   SpO2: 96%   Weight: 77.6 kg (171 lb)   Height: 162.6 cm (64.02\")   PainSc: 0-No pain         1/3/2025     9:51 AM   Current Status   ECOG score 0       Physical Exam    CONSTITUTIONAL:  Vital signs reviewed.  No distress, looks comfortable.  EYES:  Conjunctivae and lids unremarkable.  PERRLA  EARS,NOSE,MOUTH,THROAT:  Ears and nose appear unremarkable.  Lips, teeth, gums appear unremarkable.  RESPIRATORY:  Normal respiratory effort.  Lungs clear to auscultation bilaterally.  BREASTS: Bilateral mastectomy with expanders in place small areas of wound dehiscence noted with no infection  CARDIOVASCULAR:  Normal S1, S2.  No murmurs rubs or gallops.  No significant lower extremity edema.  GASTROINTESTINAL: Abdomen appears unremarkable.  Nontender.  No hepatomegaly.  No splenomegaly.  LYMPHATIC:  No cervical, supraclavicular, axillary lymphadenopathy.  SKIN:  Warm.  No rashes.  PSYCHIATRIC:  Normal judgment and insight.  Normal mood and affect.      RECENT LABS:  Hematology WBC   Date Value Ref Range Status   01/03/2025 10.76 3.40 - 10.80 10*3/mm3 Final     RBC   Date Value Ref Range Status   01/03/2025 4.35 3.77 - 5.28 10*6/mm3 Final     Hemoglobin   Date Value Ref Range Status   01/03/2025 12.8 12.0 - 15.9 g/dL Final     Hematocrit   Date Value Ref Range Status   01/03/2025 37.8 34.0 - 46.6 % Final     Platelets   Date Value Ref Range " Status   01/03/2025 479 (H) 140 - 450 10*3/mm3 Final          Final Diagnosis  1. Breast, left 9 o'clock position AR edge, core biopsy: (Vision clip)  A. Invasive mammary carcinoma, no special type (ductal), Perry histologic grade 3 (tubules = 3, nuclei = 3,  mitoses = 2), measuring 9 mm maximally, and involving approximately 9 core fragments.  B. Minimal associated ductal carcinoma in situ (DCIS) high nuclear grade with solid and cribriform architecture  and spotty single cell necrosis.  C. No definitive lymphovascular space invasion identified.  Electronically signed by Asia Cam MD on 8/30/2024 at 1550  .Synoptic Checklist  Breast Biomarker Reporting Template (BREAST BIOMARKER REPORTING TEMPLATE - All Specimens) Protocol posted: 12/13/2023  Test(s) Performed  Estrogen Receptor (ER) Status Positive (greater than 10% of cells demonstrate nuclear positivity)  Percentage of Cells with Nuclear Positivity %  Average Intensity of Staining Strong  Test Type Food and Drug Administration (FDA) cleared (test / vendor): [place]  Primary Antibody SP1  Test(s) Performed  Progesterone Receptor (PgR) Status Positive  Percentage of Cells with Nuclear Positivity 31-40%  Average Intensity of Staining Moderate  Test Type Food and Drug Administration (FDA) cleared (test / vendor): [place]  Primary Antibody 1E2  Test(s) Performed  HER2 by Immunohistochemistry Negative (Score 1+)  Test Type Food and Drug Administration (FDA) cleared (test / vendor): [place]  Primary Antibody 4B5  Test(s) Performed Ki-67  Ki-67 Percentage of Positive Nuclei 35 %    Final Diagnosis 10/18  1. Breast, right, 9:00, MRI, biopsy (buckle shaped clip): Benign breast tissue with  -A. Fibroadenomatoid hyperplasia  -B. Pseudoangiomatous stromal hyperplasia of the mammary stroma  -C. Microcalcifications in nonneoplastic tissue  -D. [place] ductal hyperplasia of the usual type  -E. Columnar cell change      Synoptic Checklist 10/31/24  INVASIVE  "CARCINOMA OF THE BREAST: Resection (INVASIVE CARCINOMA OF THE BREAST: RESECTION - All Specimens) 8th Edition  - Protocol posted: 6/19/2024  SPECIMEN  Procedure Total mastectomy  Specimen Laterality Left  .  TUMOR  Histologic Type Invasive carcinoma of no special type (ductal)  Histologic Grade (Perry Histologic Score)  Glandular (Acinar) / Tubular Differentiation Score 3  Nuclear Pleomorphism Score 3  Mitotic Rate Score 3  Overall Grade Grade 3 (scores of 8 or 9)  Tumor Size Greatest dimension of largest invasive focus (Millimeters): 16 mm  Tumor Focality Single focus of invasive carcinoma  Ductal Carcinoma In Situ (DCIS) Present  Negative for extensive intraductal component (EIC)  Size (Extent) of DCIS Estimated size (extent) of DCIS is at least (Millimeters): 20 mm  Architectural Patterns Cribriform  Solid  Nuclear Grade Grade III (high)  Necrosis Present, central (expansive \"comedo\" necrosis)  Lymphatic and / or Vascular Invasion Not identified  Dermal Lymphatic and / or Vascular Invasion Not identified  Microcalcifications Present in non-neoplastic tissue  Treatment Effect in the Breast No known presurgical therapy  .  MARGINS  Margin Status for Invasive Carcinoma All margins negative for invasive carcinoma  Distance from Invasive Carcinoma to Closest Margin Greater than: 20 mm  Distance from Invasive Carcinoma to Anterior Margin Greater than: 20 mm  Distance from Invasive Carcinoma to Posterior Margin Greater than: 20 mm  Margin Status for DCIS All margins negative for DCIS  Distance from DCIS to Closest Margin    Closest Margin(s) to DCIS Anterior  Distance from DCIS to Anterior Margin 4 mm  Distance from DCIS to Posterior Margin Greater than: 20 mm  .  REGIONAL LYMPH NODES  Regional Lymph Node Status All regional lymph nodes negative for tumor  Total Number of Lymph Nodes Examined (sentinel and non-sentinel) 4  Number of [place] Nodes Examined 4  .  pTNM CLASSIFICATION (AJCC 8th Edition)  Reporting of " pT, pN, and (when applicable) pM categories is based on information available to the pathologist at the time the report is issued. As  per the AJCC (Chapter 1, 8th Ed.) it is the managing physician's responsibility to establish the final pathologic stage based upon all pertinent  information, including but potentially not limited to this pathology report.  pT Category pT1c  pN Category pN0  N Suffix (sn)  .  SPECIAL STUDIES  Estrogen Receptor (ER) Status Positive (greater than 10% of cells demonstrate nuclear positivity)  Percentage of Cells with Nuclear Positivity %  Progesterone Receptor (PgR) Status Positive  Percentage of Cells with Nuclear Positivity 31-40%  HER2 (by immunohistochemistry) Negative (Score 1+)  Ki-67 Percentage of Positive Nuclei 35 %    Assessment & Plan   1.pT1cN0 grade 3 invasive ductal carcinoma left breast ER/WA positive HER2 negative with a Ki-67 of 35% mastectomy and sentinel node biopsy with clear margins 10/31/2024  Oncotype DX score of 33  TCx4 to start on 12/13/2024 with Neulasta support via PICC line  Had fever and significant bone pains presumably from Neulasta on body although she could have had a coincidental viral infection though COVID-negative  Consider switching to alternative growth factor next cycle  Cycle # 2TC with Neulasta on body on 1/3/2025    2.  Positive family history for breast cancer in a paternal aunt and grandmother 9 gene panel -48 gene panel neg    3.hypercholesterolemia on treatment    4.  Hypothyroidism on treatment    5.  Recent onset parosmia and dysgeusia since surgery?  Related to anesthesia versus asymptomatic COVID  1 week history of meralgia paresthetica right thigh?  Etiology had similar symptoms in the left thigh 20 years ago-May need to see neurology again    6.  Mild elevations of LFTs predating chemo-watch for now    7. Meralgia parasthetica on gabapentin  to see neurology    Plan  1.  TC #2 today with onpro  2.PICC line flush 1/2 week  3 see  KM/wp in 3 weeks for cycle 3 TC  4. See me in 6 weeks for TC      I spent 40 total minutes, face-to-face, caring for Fátima today. Greater than 50% of this time involved counseling and/or coordination of care as documented within this note.

## 2025-01-03 NOTE — PROGRESS NOTES
"OUTPATIENT ONCOLOGY NUTRITION ASSESSMENT    Patient Name: Fátima Julien  YOB: 1960  MRN: [id number]  Assessment Date: 1/3/2025    COMMENTS:  Follow up, cycle two TC. The patient reports she tolerated treatment well without any difficulty. She has had some taste changes but is eating well.   Will continue to available as needed.          Reason for Assessment Follow up     Diagnosis/Problem   Breast cancer   Treatment Plan Chemotherapy Cytoxan, Taxotere   Frequency Every 21 days    Goal of cancer treatment Curative   Comments:          Encounter Information        Nutrition/Diet History:  Good appetite   Oral Nutrition Supplements:    Factors/Symptoms Affecting Intake: No factors at this time   Comments:      Medical/Surgical History Past Medical History:   Diagnosis Date    Breast cancer     LEFT BREAST    Hyperlipidemia     Hypotension     PONV (postoperative nausea and vomiting)     SEVERE    Syncope     Thyroid nodule     Ulcerative colitis        Past Surgical History:   Procedure Laterality Date    BREAST BIOPSY Left 2024    BREAST LUMPECTOMY Bilateral     BREAST RECONSTRUCTION Bilateral 10/31/2024    Procedure: BILATERAL GOLDILOCKS RECONSTRUCTION BILATERAL  WITH TISSUE EXPANDER PLACEMENT;  Surgeon: Adan Mayorga MD;  [place] [facility] [place] [facility] [place]  Service: Plastics;  Laterality: Bilateral;    CARPAL TUNNEL RELEASE Left     CARPAL TUNNEL RELEASE Right 2023    COLONOSCOPY N/A 2014    [facility] OVARIAN CYSTECTOMY      HYSTERECTOMY      INCISION AND DRAINAGE OF WOUND  2015    KNEE ARTHROSCOPY Right 2022    MASTECTOMY W/ SENTINEL NODE BIOPSY Left 10/31/2024    Procedure: bilateral skin sparing mastectomy, left sentinel lymph node biopsy;  Surgeon: Stacy Edouard MD;  [place] [facility] [place] [facility] [place]  Service: General;  Laterality: Left;    THYROIDECTOMY, PARTIAL  2002               Anthropometrics        Current Height Ht Readings from Last 1 Encounters:   01/03/25 162.6 cm (64.02\")    " "  Current Weight Wt Readings from Last 1 Encounters:   01/03/25 77.6 kg (171 lb)      Weight History Wt Readings from Last 30 Encounters:   01/03/25 0951 77.6 kg (171 lb)   12/19/24 0849 75.3 kg (165 lb 14.4 oz)   12/16/24 1030 78.1 kg (172 lb 3.2 oz)   12/13/24 1001 75.6 kg (166 lb 9.6 oz)   12/12/24 1033 75.7 kg (166 lb 12.8 oz)   12/10/24 0817 75.8 kg (167 lb)   12/06/24 1020 75.8 kg (167 lb)   11/26/24 1519 76.4 kg (168 lb 6.4 oz)   10/21/24 1320 75.9 kg (167 lb 6.4 oz)   10/18/24 0952 72.6 kg (160 lb)   09/09/24 1105 74.8 kg (165 lb)          Medications           Current medications: ALPRAZolam, HYDROcodone-acetaminophen, Vitamin D, gabapentin, levothyroxine, multivitamin, ondansetron, probiotic, and rosuvastatin                 Tests/Procedures        Tests/Procedures Chemotherapy     Labs       Pertinent Labs    Results from last 7 days   Lab Units 01/03/25  0936   SODIUM mmol/L 140   POTASSIUM mmol/L 4.3   CHLORIDE mmol/L 106   CO2 mmol/L 22.7   BUN mg/dL 13   CREATININE mg/dL 0.60   CALCIUM mg/dL 9.6   BILIRUBIN mg/dL 0.2   ALK PHOS U/L 89   ALT (SGPT) U/L 63*   AST (SGOT) U/L 35*   GLUCOSE mg/dL 95     Results from last 7 days   Lab Units 01/03/25  0936   HEMOGLOBIN g/dL 12.8   HEMATOCRIT % 37.8   WBC 10*3/mm3 10.76   ALBUMIN g/dL 4.2     Results from last 7 days   Lab Units 01/03/25  0936   PLATELETS 10*3/mm3 479*     No results found for: \"COVID19\"  No results found for: \"HGBA1C\"       Physical Findings        Physical Appearance alert     Edema  not assessed   Gastrointestinal None   Tubes/Lines/Drains Central Line/PICC   Oral/Mouth Cavity WNL   Skin Intact       NUTRITION INTERVENTION / PLAN OF CARE      Intervention Goal(s) Provide information, Tolerate PO , Maintain intake, Maintain weight, and No significant weight loss         RD Intervention/Action Encouraged intake, Follow Tx progress         Recommendations:       PO Diet Continue diet as tolerated      Supplements       Snacks       Other  "         Monitor/Evaluation PO intake, Supplement intake, Pertinent labs, Weight, Symptoms, Follow up as needed   Education Will provide eduction as needed     Electronically signed by:  Lisa Nino RD, LD  01/03/25 12:50 EST

## 2025-01-07 ENCOUNTER — TELEPHONE (OUTPATIENT)
Dept: ONCOLOGY | Facility: CLINIC | Age: 65
End: 2025-01-07
Payer: COMMERCIAL

## 2025-01-07 NOTE — TELEPHONE ENCOUNTER
CALLED PATIENT WITH HER APPT DATE AND TIME - THAT THE PAITENT REQUESTED DUE TO THE WEATHER THAT IS COMING IN

## 2025-01-07 NOTE — TELEPHONE ENCOUNTER
"Caller: Fátima Julien \"Josue\"    Relationship to patient: Self    Best call back number: [phone]    Chief complaint: RESCHEDULE    Type of visit: PICC LINE FLUSH    Requested date: 1-10 MORNING, NOON OR THURSDAY THIS WEEK OR TOMORROW     If rescheduling, when is the original appointment: 1-10     Additional notes:PLEASE ADVISE          "

## 2025-01-10 ENCOUNTER — INFUSION (OUTPATIENT)
Dept: ONCOLOGY | Facility: HOSPITAL | Age: 65
End: 2025-01-10
Payer: COMMERCIAL

## 2025-01-13 ENCOUNTER — TELEPHONE (OUTPATIENT)
Dept: ONCOLOGY | Facility: CLINIC | Age: 65
End: 2025-01-13

## 2025-01-13 NOTE — TELEPHONE ENCOUNTER
"  Caller: Fátima Julien \"Josue\"    Relationship: Self    Best call back number: [phone]     What is the best time to reach you: ASAP    Who are you requesting to speak with (clinical staff, provider,  specific staff member): CLINICAL        What was the call regarding: PT IS HAVING FMLA PAPERWORK SENT FROM HER EMPLOYER [employer]INO TO DR ANDERSON'S TEAM, WILL NEED THIS FILLED OUT TO START WITH THE DATE SHE HAD HER SURGERY IN OCT.  PLEASE CALL PT TO ADVISE WHEN RECEIVED.      "

## 2025-01-14 ENCOUNTER — TELEPHONE (OUTPATIENT)
Dept: ONCOLOGY | Facility: CLINIC | Age: 65
End: 2025-01-14
Payer: COMMERCIAL

## 2025-01-14 NOTE — TELEPHONE ENCOUNTER
I called pt back to let her know we received FMLA from her employer, will back date to 10/31/24 -1/9/25 continuous leave from surgery with Dr. Romy Edouard's office. Intermittent leave 1/9/25-1/9/26. Pt will use continuous leave at a later date from reconstruction surgery.

## 2025-01-15 ENCOUNTER — HOSPITAL ENCOUNTER (OUTPATIENT)
Dept: PHYSICAL THERAPY | Facility: HOSPITAL | Age: 65
Setting detail: THERAPIES SERIES
Discharge: HOME | End: 2025-01-15
Payer: COMMERCIAL

## 2025-01-15 DIAGNOSIS — Z91.89 AT RISK FOR LYMPHEDEMA: ICD-10-CM

## 2025-01-15 DIAGNOSIS — Z90.13 S/P BILATERAL MASTECTOMY: ICD-10-CM

## 2025-01-15 DIAGNOSIS — C50.919 MALIGNANT NEOPLASM OF FEMALE BREAST, UNSPECIFIED ESTROGEN RECEPTOR STATUS, UNSPECIFIED LATERALITY, UNSPECIFIED SITE OF BREAST: Primary | ICD-10-CM

## 2025-01-15 DIAGNOSIS — I89.0 LYMPHEDEMA: ICD-10-CM

## 2025-01-15 PROCEDURE — 93702 BIS XTRACELL FLUID ANALYSIS: CPT

## 2025-01-15 PROCEDURE — 97535 SELF CARE MNGMENT TRAINING: CPT

## 2025-01-15 PROCEDURE — 97161 PT EVAL LOW COMPLEX 20 MIN: CPT

## 2025-01-17 ENCOUNTER — INFUSION (OUTPATIENT)
Dept: ONCOLOGY | Facility: HOSPITAL | Age: 65
End: 2025-01-17
Payer: COMMERCIAL

## 2025-01-23 NOTE — PROGRESS NOTES
Subjective     REASON FOR CONSULTATION:  pT1cN0 grade 3 ER/ME+ her 2 neg left breast ca invasive ductal post mastectomy and SLNB   Provide an opinion on any further workup or treatment                             History of Present Illness patient is a 64-year-old lady with a high risk node-negative breast cancer grade 3 with a high Oncotype score after her first dose of  adjuvant therapy with Cytoxan Taxotere for 4 cycles    She did well with her first treatment until Sunday night when she started getting achy and then became very achy and has severe bone pains on Monday and Tuesday called in and told her to take her hydrocodone which helped with the pain but she still had a moderate fever 101 for 3 days and is finally you slow down.  She thought she may have had a cold and swab for COVID which was negative but it is not clear that this is all from the chemotherapy and she may indeed have had an infection coincidentally because of the fever which is unusual    Otherwise she had no nausea vomiting neuropathy mouth sores or diarrhea  CBC today is adequate and she is back to normal    She has no new questions about her chemotherapy but reports numbness in the lateral right thigh for the last 7 days with no obvious trigger.  She reports 20 years ago she had the same problem in her left thigh saw a neurologist then we did not think there was much going on and we will set her up to see neurology at some point again because of her dysgeusia and parosmia plus neuropathy-she is taking gabapentin 600 at bedtime and this is helping with the numbness    She is here to start cycle #2 and overall I think she may have had a virus with the first cycle and so we will proceed with the same treatment and see how she does.     She presents today 1/24/2025 for Cycle 3 Cytoxan and docetaxel. She tolerated Cycle 2 well. She denies nausea, vomiting, diarrhea, worsening fatigue, shortness of breath or chest pain, skin rash, numbness or  tingling. She states she feels the worst about 3-6 days following chemotherapy and then starts to feel better, especially in the second week. She does report a headache that started on Monday of this week- it comes and goes, is dull and feels like pressure on her forehead and behind her eyes. She takes tylenol which does help, but two nights ago when laying down it wouldn't go away so she took a hydrocodone which did resolve it. She slept well last night and woke up this morning feeling good with no headache.       Past Medical History:   Diagnosis Date    Breast cancer     LEFT BREAST    Hyperlipidemia     Hypotension     PONV (postoperative nausea and vomiting)     SEVERE    Syncope     Thyroid nodule     Ulcerative colitis         Past Surgical History:   Procedure Laterality Date    BREAST BIOPSY Left 2024    BREAST LUMPECTOMY Bilateral     BREAST RECONSTRUCTION Bilateral 10/31/2024    Procedure: BILATERAL GOLDILOCKS RECONSTRUCTION BILATERAL  WITH TISSUE EXPANDER PLACEMENT;  Surgeon: Adan Mayorga MD;  Location: [facility] OR [facility] [place];  Service: Plastics;  Laterality: Bilateral;    CARPAL TUNNEL RELEASE Left     CARPAL TUNNEL RELEASE Right 2023    COLONOSCOPY N/A 2014    [facility] OVARIAN CYSTECTOMY      HYSTERECTOMY      INCISION AND DRAINAGE OF WOUND  2015    KNEE ARTHROSCOPY Right 2022    MASTECTOMY W/ SENTINEL NODE BIOPSY Left 10/31/2024    Procedure: bilateral skin sparing mastectomy, left sentinel lymph node biopsy;  Surgeon: Stacy Edouard MD;  Location: [facility] OR [facility] [place];  Service: General;  Laterality: Left;    THYROIDECTOMY, PARTIAL  2002   Oncologic history  patient is a 64-year-old postmenopausal female with hypothyroidism and hypercholesterolemia was noted on her most recent screening mammogram to have an abnormality in the left breast at 9:00 that showed invasive mammary carcinoma grade 3 measuring 9 mm with associated DCIS high-grade ER 91 to 100% RI 40% HER2 negative with a Ki-67 of  35%     patient was referred to Dr. Edouard underwent breast MRI Which showed a 1.6 x 1.5 cm irregular mass at the site of the biopsy and posteriorly there was additional 1.8 x 1.5 cm non-mass enhancement which was suspicious with no axillary or internal mammary adenopathy.  In addition the right breast showed a non-mass enhancement at 9:00 which was considered suspicious.  Patient underwent biopsy of the right breast lesion which thankfully showed PASH and was taken to surgery on 10/31/2024 and underwent bilateral mastectomy with left sentinel node biopsy with the finding of a residual 16 mm grade 3 invasive ductal carcinoma with associated DCIS measuring 20 mm with clear margins and 4 negative sentinel nodes patient had immediate reconstruction.    Oncotype DX was sent to score returned at 13 and of note the WA done by RT-PCR was negative but the ER was positive    Patient is here to discuss adjuvant treatment is doing well postsurgery but reports unusual smell and taste since surgery with no signs of COVID otherwise    She is  2 para 1 with a miscarriage first childbirth was age 22 she breast-fed for 9 months  Menarche was at age 17 she had a hysterectomy at age 54 and probable menopause a few years later because her ovaries were not removed.  She has had no postmenopausal hormone replacement    Family history is positive for maternal aunt with breast cancer in her 40s she had a second cancer in her 70s and  of metastatic breast cancer.  Paternal grandmother had breast cancer in her 60s her 9 gene [employer] panel was negative and we have reflexed to a 48 gene panel    She is not a smoker drinks 2 mixed drinks a week  She has never had a DVT MI or stroke   She has never had a DEXA scan    Discussed the prognostic and predictive value of the Oncotype DX score which indicates benefit from chemotherapy which is not unexpected in a grade 3 tumor which appears to be more luminal B in nature.    We discussed the  use of chemotherapy with Cytoxan Taxotere for 4 cycles using a PICC line.  We talked about the [place] cooling system for limiting hair loss and she is agreeable to all the above.    We will have chemo education and PICC line and see her back in 2 weeks to start her first round of chemotherapy and she knows she will take at least 5 years of hormonal blockade following chemotherapy with no plans for radiation at this time  We will review the results of her extended genetic panel with her when she returns  Current Outpatient Medications on File Prior to Visit   Medication Sig Dispense Refill    ALPRAZolam (Xanax) 0.25 MG tablet Take 1 tablet by mouth 2 (Two) Times a Day As Needed for Anxiety. 20 tablet 0    gabapentin (NEURONTIN) 300 MG capsule Take 2 capsules by mouth Every Night. 60 capsule 1    HYDROcodone-acetaminophen (NORCO) 5-325 MG per tablet Take 1 tablet by mouth Every 6 (Six) Hours As Needed for Moderate Pain. 15 tablet 0    levothyroxine (SYNTHROID, LEVOTHROID) 25 MCG tablet Take 1 tablet by mouth Daily.      multivitamin (MULTIVITAMIN PO) Take 1 tablet by mouth Daily. HOLD FOR SURGERY      ondansetron (ZOFRAN) 8 MG tablet Take 1 tablet by mouth 3 (Three) Times a Day As Needed for Nausea or Vomiting. 30 tablet 5    probiotic (CULTURELLE) capsule capsule Take 1 capsule by mouth Daily.      rosuvastatin (CRESTOR) 20 MG tablet Take 1 tablet by mouth Daily.      VITAMIN D PO Take 1 tablet by mouth Daily.       No current facility-administered medications on file prior to visit.        ALLERGIES:    Allergies   Allergen Reactions    Tetanus Toxoid Hives    Penicillin G Nausea And Vomiting     Beta lactam allergy details  Antibiotic reaction:  (n/v)  Age at reaction: adult  Dose to reaction time: unknown  Reason for antibiotic: unknown  Epinephrine required for reaction?: unknown  Tolerated antibiotics: unknown   Beta lactam allergy details  Antibiotic reaction: rash  Age at reaction: adult  Dose to reaction  "time: unknown  Reason for antibiotic: unknown  Epinephrine required for reaction?: no  Tolerated antibiotics: amoxicillin, augmentin            Social History     Socioeconomic History    Marital status:      Spouse name: Cheo   Tobacco Use    Smoking status: Never    Smokeless tobacco: Never   Vaping Use    Vaping status: Never Used   Substance and Sexual Activity    Alcohol use: Yes     Alcohol/week: 1.0 standard drink of alcohol     Types: 1 Glasses of wine per week     Comment: occasionally    Drug use: Never    Sexual activity: Yes     Partners: Male     Birth control/protection: Post-menopausal, Hysterectomy        Family History   Problem Relation Age of Onset    Breast cancer Maternal Aunt 40 - 49        diagnosed in her 40s and mid to late 70s    Diabetes Mother     Arthritis Father     Cancer Father     Diabetes Son     [place] Hyperthermia Neg Hx           Objective   Vitals:    01/24/25 0848   BP: 119/79   Pulse: 73   Resp: 16   Temp: 97.9 °F (36.6 °C)   TempSrc: Oral   SpO2: 96%   Weight: 78.8 kg (173 lb 11.2 oz)   Height: 162.6 cm (64.02\")   PainSc: 0-No pain           1/3/2025     9:51 AM   Current Status   ECOG score 0       Physical Exam    CONSTITUTIONAL:  Vital signs reviewed.  No distress, looks comfortable.  EYES:  Conjunctivae and lids unremarkable.  PERRLA  EARS,NOSE,MOUTH,THROAT:  Ears and nose appear unremarkable.  Lips, teeth, gums appear unremarkable.  RESPIRATORY:  Normal respiratory effort.  Lungs clear to auscultation bilaterally.   BREASTS: S/p bilateral mastectomy with reconstruction  CARDIOVASCULAR:  Normal S1, S2.  No murmurs rubs or gallops.  No significant lower extremity edema.  GASTROINTESTINAL: Abdomen appears unremarkable.    LYMPHATIC:  No cervical, supraclavicular, axillary lymphadenopathy.  SKIN:  Warm.  No rashes.  PSYCHIATRIC:  Normal judgment and insight.  Normal mood and affect.      RECENT LABS:  Results from last 7 days   Lab Units 01/24/25  0827   WBC 10*3/mm3 " 8.05   NEUTROS ABS 10*3/mm3 6.56   HEMOGLOBIN g/dL 12.1   HEMATOCRIT % 36.4   PLATELETS 10*3/mm3 395     Results from last 7 days   Lab Units 01/24/25  0827   SODIUM mmol/L 139   POTASSIUM mmol/L 4.2   CHLORIDE mmol/L 105   CO2 mmol/L 23.6   BUN mg/dL 13   CREATININE mg/dL 0.62   CALCIUM mg/dL 9.3   ALBUMIN g/dL 3.9   BILIRUBIN mg/dL 0.2   ALK PHOS U/L 85   ALT (SGPT) U/L 42*   AST (SGOT) U/L 25   GLUCOSE mg/dL 110*               Final Diagnosis  1. Breast, left 9 o'clock position AR edge, core biopsy: (Vision clip)  A. Invasive mammary carcinoma, no special type (ductal), [place] histologic grade 3 (tubules = 3, nuclei = 3,  mitoses = 2), measuring 9 mm maximally, and involving approximately 9 core fragments.  B. Minimal associated ductal carcinoma in situ (DCIS) high nuclear grade with solid and cribriform architecture  and spotty single cell necrosis.  C. No definitive lymphovascular space invasion identified.  Electronically signed by Asia Cam MD on 8/30/2024 at 1550  .Synoptic Checklist  Breast Biomarker Reporting Template (BREAST BIOMARKER REPORTING TEMPLATE - All Specimens) Protocol posted: 12/13/2023  Test(s) Performed  Estrogen Receptor (ER) Status Positive (greater than 10% of cells demonstrate nuclear positivity)  Percentage of Cells with Nuclear Positivity %  Average Intensity of Staining Strong  Test Type Food and Drug Administration (FDA) cleared (test / vendor): [place]  Primary Antibody SP1  Test(s) Performed  Progesterone Receptor (PgR) Status Positive  Percentage of Cells with Nuclear Positivity 31-40%  Average Intensity of Staining Moderate  Test Type Food and Drug Administration (FDA) cleared (test / vendor): [facility]  Primary Antibody 1E2  Test(s) Performed  HER2 by Immunohistochemistry Negative (Score 1+)  Test Type Food and Drug Administration (FDA) cleared (test / vendor): [facility]  Primary Antibody 4B5  Test(s) Performed Ki-67  Ki-67 Percentage of Positive Nuclei 35 %    Final  "Diagnosis 10/18  1. Breast, right, 9:00, MRI, biopsy (buckle shaped clip): Benign breast tissue with  -A. Fibroadenomatoid hyperplasia  -B. Pseudoangiomatous stromal hyperplasia of the mammary stroma  -C. Microcalcifications in nonneoplastic tissue  -D. [place] ductal hyperplasia of the usual type  -E. Columnar cell change      Synoptic Checklist 10/31/24  INVASIVE CARCINOMA OF THE BREAST: Resection (INVASIVE CARCINOMA OF THE BREAST: RESECTION - All Specimens) 8th Edition  - Protocol posted: 6/19/2024  SPECIMEN  Procedure Total mastectomy  Specimen Laterality Left  .  TUMOR  Histologic Type Invasive carcinoma of no special type (ductal)  Histologic Grade ([place] Histologic Score)  Glandular (Acinar) / Tubular Differentiation Score 3  Nuclear Pleomorphism Score 3  Mitotic Rate Score 3  Overall Grade Grade 3 (scores of 8 or 9)  Tumor Size Greatest dimension of largest invasive focus (Millimeters): 16 mm  Tumor Focality Single focus of invasive carcinoma  Ductal Carcinoma In Situ (DCIS) Present  Negative for extensive intraductal component (EIC)  Size (Extent) of DCIS Estimated size (extent) of DCIS is at least (Millimeters): 20 mm  Architectural Patterns Cribriform  Solid  Nuclear Grade Grade III (high)  Necrosis Present, central (expansive \"comedo\" necrosis)  Lymphatic and / or Vascular Invasion Not identified  Dermal Lymphatic and / or Vascular Invasion Not identified  Microcalcifications Present in non-neoplastic tissue  Treatment Effect in the Breast No known presurgical therapy  .  MARGINS  Margin Status for Invasive Carcinoma All margins negative for invasive carcinoma  Distance from Invasive Carcinoma to Closest Margin Greater than: 20 mm  Distance from Invasive Carcinoma to Anterior Margin Greater than: 20 mm  Distance from Invasive Carcinoma to Posterior Margin Greater than: 20 mm  Margin Status for DCIS All margins negative for DCIS  Distance from DCIS to Closest Margin    Closest Margin(s) to DCIS " Anterior  Distance from DCIS to Anterior Margin 4 mm  Distance from DCIS to Posterior Margin Greater than: 20 mm  .  REGIONAL LYMPH NODES  Regional Lymph Node Status All regional lymph nodes negative for tumor  Total Number of Lymph Nodes Examined (sentinel and non-sentinel) 4  Number of [place] Nodes Examined 4  .  pTNM CLASSIFICATION (AJCC 8th Edition)  Reporting of pT, pN, and (when applicable) pM categories is based on information available to the pathologist at the time the report is issued. As  per the AJCC (Chapter 1, 8th Ed.) it is the managing physician's responsibility to establish the final pathologic stage based upon all pertinent  information, including but potentially not limited to this pathology report.  pT Category pT1c  pN Category pN0  N Suffix (sn)  .  SPECIAL STUDIES  Estrogen Receptor (ER) Status Positive (greater than 10% of cells demonstrate nuclear positivity)  Percentage of Cells with Nuclear Positivity %  Progesterone Receptor (PgR) Status Positive  Percentage of Cells with Nuclear Positivity 31-40%  HER2 (by immunohistochemistry) Negative (Score 1+)  Ki-67 Percentage of Positive Nuclei 35 %    Assessment & Plan   1.pT1cN0 grade 3 invasive ductal carcinoma left breast ER/ID positive HER2 negative with a Ki-67 of 35% mastectomy and sentinel node biopsy with clear margins 10/31/2024  Oncotype DX score of 33  TCx4 to start on 12/13/2024 with Neulasta support via PICC line  Had fever and significant bone pains presumably from Neulasta on body although she could have had a coincidental viral infection though COVID-negative  Consider switching to alternative growth factor next cycle  Cycle # 2TC with Neulasta on body on 1/3/2025  1/24/2025: Proceed with Cycle 3 TC with neulasta support. Overall tolerating this well. The headaches could be related to sinus congestion, advised trying flonase and reaching out if no improvement.    2.  Positive family history for breast cancer in a paternal  aunt and grandmother 9 gene panel -48 gene panel neg    3.hypercholesterolemia on treatment    4.  Hypothyroidism on treatment    5.  Recent onset parosmia and dysgeusia since surgery?  Related to anesthesia versus asymptomatic COVID  1 week history of meralgia paresthetica right thigh?  Etiology had similar symptoms in the left thigh 20 years ago-May need to see neurology again    6.  Mild elevations of LFTs predating chemo-watch for now    7. Meralgia parasthetica on gabapentin  to see neurology    Plan:  Proceed with cycle 3 TC with neulasta on pro   Continue weekly dressing changes with PICC line  Return to clinic in 3 weeks for MD visit, Cycle 4 TC, cbc and cmp.   Instructed to reach out sooner with any concerns.     ROSE Desai

## 2025-01-24 ENCOUNTER — OFFICE VISIT (OUTPATIENT)
Dept: ONCOLOGY | Facility: CLINIC | Age: 65
End: 2025-01-24
Payer: COMMERCIAL

## 2025-01-24 ENCOUNTER — INFUSION (OUTPATIENT)
Dept: ONCOLOGY | Facility: HOSPITAL | Age: 65
End: 2025-01-24
Payer: COMMERCIAL

## 2025-01-24 VITALS
RESPIRATION RATE: 16 BRPM | OXYGEN SATURATION: 96 % | HEIGHT: 64 IN | TEMPERATURE: 97.9 F | SYSTOLIC BLOOD PRESSURE: 119 MMHG | DIASTOLIC BLOOD PRESSURE: 79 MMHG | HEART RATE: 73 BPM | WEIGHT: 173.7 LBS | BODY MASS INDEX: 29.65 KG/M2

## 2025-01-24 DIAGNOSIS — Z17.0 MALIGNANT NEOPLASM OF LEFT BREAST IN FEMALE, ESTROGEN RECEPTOR POSITIVE, UNSPECIFIED SITE OF BREAST: Primary | ICD-10-CM

## 2025-01-24 DIAGNOSIS — C50.912 MALIGNANT NEOPLASM OF LEFT BREAST IN FEMALE, ESTROGEN RECEPTOR POSITIVE, UNSPECIFIED SITE OF BREAST: Primary | ICD-10-CM

## 2025-01-24 DIAGNOSIS — C50.912 MALIGNANT NEOPLASM OF LEFT BREAST IN FEMALE, ESTROGEN RECEPTOR POSITIVE, UNSPECIFIED SITE OF BREAST: ICD-10-CM

## 2025-01-24 DIAGNOSIS — Z17.0 MALIGNANT NEOPLASM OF LEFT BREAST IN FEMALE, ESTROGEN RECEPTOR POSITIVE, UNSPECIFIED SITE OF BREAST: ICD-10-CM

## 2025-01-24 LAB
ALBUMIN SERPL-MCNC: 3.9 G/DL (ref 3.5–5.2)
ALBUMIN/GLOB SERPL: 1.5 G/DL
ALP SERPL-CCNC: 85 U/L (ref 39–117)
ALT SERPL W P-5'-P-CCNC: 42 U/L (ref 1–33)
ANION GAP SERPL CALCULATED.3IONS-SCNC: 10.4 MMOL/L (ref 5–15)
AST SERPL-CCNC: 25 U/L (ref 1–32)
BASOPHILS # BLD AUTO: 0.02 10*3/MM3 (ref 0–0.2)
BASOPHILS NFR BLD AUTO: 0.2 % (ref 0–1.5)
BILIRUB SERPL-MCNC: 0.2 MG/DL (ref 0–1.2)
BUN SERPL-MCNC: 13 MG/DL (ref 8–23)
BUN/CREAT SERPL: 21 (ref 7–25)
CALCIUM SPEC-SCNC: 9.3 MG/DL (ref 8.6–10.5)
CHLORIDE SERPL-SCNC: 105 MMOL/L (ref 98–107)
CO2 SERPL-SCNC: 23.6 MMOL/L (ref 22–29)
CREAT SERPL-MCNC: 0.62 MG/DL (ref 0.57–1)
DEPRECATED RDW RBC AUTO: 48.1 FL (ref 37–54)
EGFRCR SERPLBLD CKD-EPI 2021: 100.8 ML/MIN/1.73
EOSINOPHIL # BLD AUTO: 0.03 10*3/MM3 (ref 0–0.4)
EOSINOPHIL NFR BLD AUTO: 0.4 % (ref 0.3–6.2)
ERYTHROCYTE [DISTWIDTH] IN BLOOD BY AUTOMATED COUNT: 15 % (ref 12.3–15.4)
GLOBULIN UR ELPH-MCNC: 2.6 GM/DL
GLUCOSE SERPL-MCNC: 110 MG/DL (ref 65–99)
HCT VFR BLD AUTO: 36.4 % (ref 34–46.6)
HGB BLD-MCNC: 12.1 G/DL (ref 12–15.9)
IMM GRANULOCYTES # BLD AUTO: 0.02 10*3/MM3 (ref 0–0.05)
IMM GRANULOCYTES NFR BLD AUTO: 0.2 % (ref 0–0.5)
LYMPHOCYTES # BLD AUTO: 0.82 10*3/MM3 (ref 0.7–3.1)
LYMPHOCYTES NFR BLD AUTO: 10.2 % (ref 19.6–45.3)
MCH RBC QN AUTO: 29.5 PG (ref 26.6–33)
MCHC RBC AUTO-ENTMCNC: 33.2 G/DL (ref 31.5–35.7)
MCV RBC AUTO: 88.8 FL (ref 79–97)
MONOCYTES # BLD AUTO: 0.6 10*3/MM3 (ref 0.1–0.9)
MONOCYTES NFR BLD AUTO: 7.5 % (ref 5–12)
NEUTROPHILS NFR BLD AUTO: 6.56 10*3/MM3 (ref 1.7–7)
NEUTROPHILS NFR BLD AUTO: 81.5 % (ref 42.7–76)
NRBC BLD AUTO-RTO: 0 /100 WBC (ref 0–0.2)
PLATELET # BLD AUTO: 395 10*3/MM3 (ref 140–450)
PMV BLD AUTO: 8.8 FL (ref 6–12)
POTASSIUM SERPL-SCNC: 4.2 MMOL/L (ref 3.5–5.2)
PROT SERPL-MCNC: 6.5 G/DL (ref 6–8.5)
RBC # BLD AUTO: 4.1 10*6/MM3 (ref 3.77–5.28)
SODIUM SERPL-SCNC: 139 MMOL/L (ref 136–145)
WBC NRBC COR # BLD AUTO: 8.05 10*3/MM3 (ref 3.4–10.8)

## 2025-01-24 PROCEDURE — 25010000002 CYCLOPHOSPHAMIDE 2 GM/10ML SOLUTION 10 ML VIAL

## 2025-01-24 PROCEDURE — 25010000002 PEGFILGRASTIM-CBQV 6 MG/0.6ML SOLUTION PREFILLED SYRINGE

## 2025-01-24 PROCEDURE — 25010000002 DOCETAXEL 20 MG/ML SOLUTION 8 ML VIAL

## 2025-01-24 PROCEDURE — 96372 THER/PROPH/DIAG INJ SC/IM: CPT

## 2025-01-24 PROCEDURE — 80053 COMPREHEN METABOLIC PANEL: CPT

## 2025-01-24 PROCEDURE — 96375 TX/PRO/DX INJ NEW DRUG ADDON: CPT

## 2025-01-24 PROCEDURE — 96417 CHEMO IV INFUS EACH ADDL SEQ: CPT

## 2025-01-24 PROCEDURE — 25010000002 PALONOSETRON PER 25 MCG

## 2025-01-24 PROCEDURE — 85025 COMPLETE CBC W/AUTO DIFF WBC: CPT

## 2025-01-24 PROCEDURE — 25810000003 SODIUM CHLORIDE 0.9 % SOLUTION 250 ML FLEX CONT

## 2025-01-24 PROCEDURE — 96413 CHEMO IV INFUSION 1 HR: CPT

## 2025-01-24 PROCEDURE — 25010000002 DIPHENHYDRAMINE PER 50 MG

## 2025-01-24 RX ORDER — FAMOTIDINE 10 MG/ML
20 INJECTION, SOLUTION INTRAVENOUS AS NEEDED
Status: CANCELLED | OUTPATIENT
Start: 2025-01-24

## 2025-01-24 RX ORDER — DIPHENHYDRAMINE HYDROCHLORIDE 50 MG/ML
25 INJECTION INTRAMUSCULAR; INTRAVENOUS ONCE
Status: COMPLETED | OUTPATIENT
Start: 2025-01-24 | End: 2025-01-24

## 2025-01-24 RX ORDER — DIPHENHYDRAMINE HYDROCHLORIDE 50 MG/ML
50 INJECTION INTRAMUSCULAR; INTRAVENOUS AS NEEDED
Status: CANCELLED | OUTPATIENT
Start: 2025-01-24

## 2025-01-24 RX ORDER — PALONOSETRON 0.05 MG/ML
0.25 INJECTION, SOLUTION INTRAVENOUS ONCE
Status: COMPLETED | OUTPATIENT
Start: 2025-01-24 | End: 2025-01-24

## 2025-01-24 RX ORDER — FAMOTIDINE 10 MG/ML
20 INJECTION, SOLUTION INTRAVENOUS ONCE
Status: COMPLETED | OUTPATIENT
Start: 2025-01-24 | End: 2025-01-24

## 2025-01-24 RX ORDER — SODIUM CHLORIDE 9 MG/ML
20 INJECTION, SOLUTION INTRAVENOUS ONCE
Status: DISCONTINUED | OUTPATIENT
Start: 2025-01-24 | End: 2025-01-24 | Stop reason: HOSPADM

## 2025-01-24 RX ORDER — HYDROCORTISONE SODIUM SUCCINATE 100 MG/2ML
100 INJECTION INTRAMUSCULAR; INTRAVENOUS AS NEEDED
Status: CANCELLED | OUTPATIENT
Start: 2025-01-24

## 2025-01-24 RX ORDER — ALPRAZOLAM 0.25 MG/1
0.25 TABLET ORAL 2 TIMES DAILY PRN
Qty: 20 TABLET | Refills: 0 | Status: SHIPPED | OUTPATIENT
Start: 2025-01-24

## 2025-01-24 RX ADMIN — PEGFILGRASTIM-CBQV 6 MG: 6 INJECTION, SOLUTION SUBCUTANEOUS at 11:49

## 2025-01-24 RX ADMIN — PALONOSETRON HYDROCHLORIDE 0.25 MG: 0.25 INJECTION INTRAVENOUS at 09:28

## 2025-01-24 RX ADMIN — FAMOTIDINE 20 MG: 10 INJECTION INTRAVENOUS at 09:32

## 2025-01-24 RX ADMIN — CYCLOPHOSPHAMIDE 1090 MG: 200 INJECTION, SOLUTION INTRAVENOUS at 11:18

## 2025-01-24 RX ADMIN — DOCETAXEL 135 MG: 20 INJECTION, SOLUTION, CONCENTRATE INTRAVENOUS at 10:09

## 2025-01-24 RX ADMIN — DIPHENHYDRAMINE HYDROCHLORIDE 25 MG: 50 INJECTION, SOLUTION INTRAMUSCULAR; INTRAVENOUS at 09:35

## 2025-02-03 ENCOUNTER — INFUSION (OUTPATIENT)
Dept: ONCOLOGY | Facility: HOSPITAL | Age: 65
End: 2025-02-03
Payer: COMMERCIAL

## 2025-02-03 DIAGNOSIS — Z17.0 MALIGNANT NEOPLASM OF LEFT BREAST IN FEMALE, ESTROGEN RECEPTOR POSITIVE, UNSPECIFIED SITE OF BREAST: Primary | ICD-10-CM

## 2025-02-03 DIAGNOSIS — C50.912 MALIGNANT NEOPLASM OF LEFT BREAST IN FEMALE, ESTROGEN RECEPTOR POSITIVE, UNSPECIFIED SITE OF BREAST: Primary | ICD-10-CM

## 2025-02-03 PROCEDURE — 96374 THER/PROPH/DIAG INJ IV PUSH: CPT

## 2025-02-03 PROCEDURE — 25010000002 PROCHLORPERAZINE 10 MG/2ML SOLUTION

## 2025-02-03 RX ORDER — PROCHLORPERAZINE EDISYLATE 5 MG/ML
10 INJECTION INTRAMUSCULAR; INTRAVENOUS ONCE
Status: COMPLETED | OUTPATIENT
Start: 2025-02-03 | End: 2025-02-03

## 2025-02-03 RX ORDER — PROCHLORPERAZINE MALEATE 10 MG
10 TABLET ORAL EVERY 6 HOURS PRN
Qty: 30 TABLET | Refills: 1 | Status: SHIPPED | OUTPATIENT
Start: 2025-02-03

## 2025-02-03 RX ADMIN — PROCHLORPERAZINE EDISYLATE 10 MG: 5 INJECTION INTRAMUSCULAR; INTRAVENOUS at 15:03

## 2025-02-03 NOTE — NURSING NOTE
The patient presents today for a PICC line dressing change complaining of nausea. The patient stated that she has been taking zofran as directed yet the nausea still persists. Discussed with Jackelin ROSE and was given futher orders to give compazine IV once in the office and to send in PO compazine to the patients pharmacy

## 2025-02-07 ENCOUNTER — INFUSION (OUTPATIENT)
Dept: ONCOLOGY | Facility: HOSPITAL | Age: 65
End: 2025-02-07
Payer: COMMERCIAL

## 2025-02-07 PROCEDURE — G0463 HOSPITAL OUTPT CLINIC VISIT: HCPCS

## 2025-02-14 ENCOUNTER — INFUSION (OUTPATIENT)
Dept: ONCOLOGY | Facility: HOSPITAL | Age: 65
End: 2025-02-14
Payer: COMMERCIAL

## 2025-02-14 ENCOUNTER — OFFICE VISIT (OUTPATIENT)
Dept: ONCOLOGY | Facility: CLINIC | Age: 65
End: 2025-02-14
Payer: COMMERCIAL

## 2025-02-14 VITALS
BODY MASS INDEX: 29.5 KG/M2 | HEIGHT: 64 IN | SYSTOLIC BLOOD PRESSURE: 132 MMHG | RESPIRATION RATE: 16 BRPM | WEIGHT: 172.8 LBS | HEART RATE: 90 BPM | OXYGEN SATURATION: 97 % | TEMPERATURE: 98 F | DIASTOLIC BLOOD PRESSURE: 84 MMHG

## 2025-02-14 DIAGNOSIS — Z17.0 MALIGNANT NEOPLASM OF LEFT BREAST IN FEMALE, ESTROGEN RECEPTOR POSITIVE, UNSPECIFIED SITE OF BREAST: Primary | ICD-10-CM

## 2025-02-14 DIAGNOSIS — Z17.0 MALIGNANT NEOPLASM OF LEFT BREAST IN FEMALE, ESTROGEN RECEPTOR POSITIVE, UNSPECIFIED SITE OF BREAST: ICD-10-CM

## 2025-02-14 DIAGNOSIS — C50.912 MALIGNANT NEOPLASM OF LEFT BREAST IN FEMALE, ESTROGEN RECEPTOR POSITIVE, UNSPECIFIED SITE OF BREAST: Primary | ICD-10-CM

## 2025-02-14 DIAGNOSIS — C50.912 MALIGNANT NEOPLASM OF LEFT BREAST IN FEMALE, ESTROGEN RECEPTOR POSITIVE, UNSPECIFIED SITE OF BREAST: ICD-10-CM

## 2025-02-14 LAB
ALBUMIN SERPL-MCNC: 4 G/DL (ref 3.5–5.2)
ALBUMIN/GLOB SERPL: 1.5 G/DL
ALP SERPL-CCNC: 97 U/L (ref 39–117)
ALT SERPL W P-5'-P-CCNC: 17 U/L (ref 1–33)
ANION GAP SERPL CALCULATED.3IONS-SCNC: 10.7 MMOL/L (ref 5–15)
AST SERPL-CCNC: 17 U/L (ref 1–32)
BASOPHILS # BLD AUTO: 0.01 10*3/MM3 (ref 0–0.2)
BASOPHILS NFR BLD AUTO: 0.2 % (ref 0–1.5)
BILIRUB SERPL-MCNC: 0.2 MG/DL (ref 0–1.2)
BUN SERPL-MCNC: 14 MG/DL (ref 8–23)
BUN/CREAT SERPL: 21.5 (ref 7–25)
CALCIUM SPEC-SCNC: 9.6 MG/DL (ref 8.6–10.5)
CHLORIDE SERPL-SCNC: 104 MMOL/L (ref 98–107)
CO2 SERPL-SCNC: 23.3 MMOL/L (ref 22–29)
CREAT SERPL-MCNC: 0.65 MG/DL (ref 0.57–1)
DEPRECATED RDW RBC AUTO: 48.6 FL (ref 37–54)
EGFRCR SERPLBLD CKD-EPI 2021: 99.7 ML/MIN/1.73
EOSINOPHIL # BLD AUTO: 0.01 10*3/MM3 (ref 0–0.4)
EOSINOPHIL NFR BLD AUTO: 0.2 % (ref 0.3–6.2)
ERYTHROCYTE [DISTWIDTH] IN BLOOD BY AUTOMATED COUNT: 14.9 % (ref 12.3–15.4)
GLOBULIN UR ELPH-MCNC: 2.7 GM/DL
GLUCOSE SERPL-MCNC: 113 MG/DL (ref 65–99)
HCT VFR BLD AUTO: 35.2 % (ref 34–46.6)
HGB BLD-MCNC: 11.6 G/DL (ref 12–15.9)
IMM GRANULOCYTES # BLD AUTO: 0.02 10*3/MM3 (ref 0–0.05)
IMM GRANULOCYTES NFR BLD AUTO: 0.4 % (ref 0–0.5)
LYMPHOCYTES # BLD AUTO: 0.63 10*3/MM3 (ref 0.7–3.1)
LYMPHOCYTES NFR BLD AUTO: 13 % (ref 19.6–45.3)
MCH RBC QN AUTO: 29.2 PG (ref 26.6–33)
MCHC RBC AUTO-ENTMCNC: 33 G/DL (ref 31.5–35.7)
MCV RBC AUTO: 88.7 FL (ref 79–97)
MONOCYTES # BLD AUTO: 0.6 10*3/MM3 (ref 0.1–0.9)
MONOCYTES NFR BLD AUTO: 12.4 % (ref 5–12)
NEUTROPHILS NFR BLD AUTO: 3.58 10*3/MM3 (ref 1.7–7)
NEUTROPHILS NFR BLD AUTO: 73.8 % (ref 42.7–76)
NRBC BLD AUTO-RTO: 0 /100 WBC (ref 0–0.2)
PLATELET # BLD AUTO: 510 10*3/MM3 (ref 140–450)
PMV BLD AUTO: 8.7 FL (ref 6–12)
POTASSIUM SERPL-SCNC: 4.2 MMOL/L (ref 3.5–5.2)
PROT SERPL-MCNC: 6.7 G/DL (ref 6–8.5)
RBC # BLD AUTO: 3.97 10*6/MM3 (ref 3.77–5.28)
SODIUM SERPL-SCNC: 138 MMOL/L (ref 136–145)
WBC NRBC COR # BLD AUTO: 4.85 10*3/MM3 (ref 3.4–10.8)

## 2025-02-14 PROCEDURE — 25010000002 PALONOSETRON PER 25 MCG: Performed by: INTERNAL MEDICINE

## 2025-02-14 PROCEDURE — 96375 TX/PRO/DX INJ NEW DRUG ADDON: CPT

## 2025-02-14 PROCEDURE — 96377 APPLICATON ON-BODY INJECTOR: CPT

## 2025-02-14 PROCEDURE — 96417 CHEMO IV INFUS EACH ADDL SEQ: CPT

## 2025-02-14 PROCEDURE — 96413 CHEMO IV INFUSION 1 HR: CPT

## 2025-02-14 PROCEDURE — 25010000002 DIPHENHYDRAMINE PER 50 MG: Performed by: INTERNAL MEDICINE

## 2025-02-14 PROCEDURE — 25010000002 CYCLOPHOSPHAMIDE 2 GM/10ML SOLUTION 10 ML VIAL: Performed by: INTERNAL MEDICINE

## 2025-02-14 PROCEDURE — 25810000003 SODIUM CHLORIDE 0.9 % SOLUTION 250 ML FLEX CONT: Performed by: INTERNAL MEDICINE

## 2025-02-14 PROCEDURE — 25010000002 PEGFILGRASTIM-CBQV 6 MG/0.6ML SOLUTION PREFILLED SYRINGE: Performed by: INTERNAL MEDICINE

## 2025-02-14 PROCEDURE — 96372 THER/PROPH/DIAG INJ SC/IM: CPT

## 2025-02-14 PROCEDURE — 25010000002 DOCETAXEL 20 MG/ML SOLUTION 8 ML VIAL: Performed by: INTERNAL MEDICINE

## 2025-02-14 PROCEDURE — 85025 COMPLETE CBC W/AUTO DIFF WBC: CPT

## 2025-02-14 PROCEDURE — 80053 COMPREHEN METABOLIC PANEL: CPT

## 2025-02-14 RX ORDER — PALONOSETRON 0.05 MG/ML
0.25 INJECTION, SOLUTION INTRAVENOUS ONCE
Status: CANCELLED | OUTPATIENT
Start: 2025-02-14

## 2025-02-14 RX ORDER — FAMOTIDINE 10 MG/ML
20 INJECTION, SOLUTION INTRAVENOUS ONCE
Status: CANCELLED | OUTPATIENT
Start: 2025-02-14

## 2025-02-14 RX ORDER — SODIUM CHLORIDE 9 MG/ML
20 INJECTION, SOLUTION INTRAVENOUS ONCE
Status: CANCELLED | OUTPATIENT
Start: 2025-02-14

## 2025-02-14 RX ORDER — HYDROCORTISONE SODIUM SUCCINATE 100 MG/2ML
100 INJECTION INTRAMUSCULAR; INTRAVENOUS AS NEEDED
Status: CANCELLED | OUTPATIENT
Start: 2025-02-14

## 2025-02-14 RX ORDER — PALONOSETRON 0.05 MG/ML
0.25 INJECTION, SOLUTION INTRAVENOUS ONCE
Status: COMPLETED | OUTPATIENT
Start: 2025-02-14 | End: 2025-02-14

## 2025-02-14 RX ORDER — HYDROCODONE BITARTRATE AND ACETAMINOPHEN 5; 325 MG/1; MG/1
1 TABLET ORAL EVERY 6 HOURS PRN
Qty: 15 TABLET | Refills: 0 | Status: SHIPPED | OUTPATIENT
Start: 2025-02-14

## 2025-02-14 RX ORDER — DIPHENHYDRAMINE HYDROCHLORIDE 50 MG/ML
25 INJECTION INTRAMUSCULAR; INTRAVENOUS ONCE
Status: COMPLETED | OUTPATIENT
Start: 2025-02-14 | End: 2025-02-14

## 2025-02-14 RX ORDER — DIPHENHYDRAMINE HYDROCHLORIDE 50 MG/ML
50 INJECTION INTRAMUSCULAR; INTRAVENOUS AS NEEDED
Status: CANCELLED | OUTPATIENT
Start: 2025-02-14

## 2025-02-14 RX ORDER — OLANZAPINE 2.5 MG/1
TABLET, FILM COATED ORAL
Qty: 14 TABLET | Refills: 0 | Status: SHIPPED | OUTPATIENT
Start: 2025-02-14

## 2025-02-14 RX ORDER — FAMOTIDINE 10 MG/ML
20 INJECTION, SOLUTION INTRAVENOUS AS NEEDED
Status: CANCELLED | OUTPATIENT
Start: 2025-02-14

## 2025-02-14 RX ORDER — FAMOTIDINE 10 MG/ML
20 INJECTION, SOLUTION INTRAVENOUS ONCE
Status: COMPLETED | OUTPATIENT
Start: 2025-02-14 | End: 2025-02-14

## 2025-02-14 RX ADMIN — DIPHENHYDRAMINE HYDROCHLORIDE 25 MG: 50 INJECTION, SOLUTION INTRAMUSCULAR; INTRAVENOUS at 09:20

## 2025-02-14 RX ADMIN — PALONOSETRON HYDROCHLORIDE 0.25 MG: 0.25 INJECTION INTRAVENOUS at 09:17

## 2025-02-14 RX ADMIN — DOCETAXEL 125 MG: 20 INJECTION, SOLUTION, CONCENTRATE INTRAVENOUS at 09:58

## 2025-02-14 RX ADMIN — CYCLOPHOSPHAMIDE 1090 MG: 200 INJECTION, SOLUTION INTRAVENOUS at 11:01

## 2025-02-14 RX ADMIN — FAMOTIDINE 20 MG: 10 INJECTION INTRAVENOUS at 09:18

## 2025-02-14 RX ADMIN — PEGFILGRASTIM-CBQV 6 MG: 6 INJECTION, SOLUTION SUBCUTANEOUS at 11:59

## 2025-02-14 NOTE — PROGRESS NOTES
Subjective     REASON FOR CONSULTATION:  pT1cN0 grade 3 ER/MD+ her 2 neg left breast ca invasive ductal post mastectomy and SLNB   Provide an opinion on any further workup or treatment                             History of Present Illness patient is a 64-year-old lady with a high risk node-negative breast cancer grade 3 with a high Oncotype score after her first dose of  adjuvant therapy with Cytoxan Taxotere for 4 cycles    She is here for cycle 4 and had significant issues with cycle 3 with nausea which lasted for about 10 days and then not respond well to Zofran which caused a headache in addition so we called in Compazine which helped she has persistent meralgia paresthetica on her right thigh and she is taking gabapentin 600 at bedtime to help her sleep because of the discomfort and is scheduled to see neurology in April which was the soonest appointment they could get her    I have recommended she take Zyprexa 2.5 for the first 2 weeks after this chemo and hopefully the symptoms will resolve and I have cut her Taxotere back by 10%    She has no new questions about her chemotherapy but reports numbness in the lateral right thigh persists.  She reports 20 years ago she had the same problem in her left thigh saw a neurologist then we did not think there was much going on and we will set her up to see neurology at some point again -she is taking gabapentin 600 at bedtime and this is helping with the numbness  Past Medical History:   Diagnosis Date    Breast cancer     LEFT BREAST    Hyperlipidemia     Hypotension     PONV (postoperative nausea and vomiting)     SEVERE    Syncope     Thyroid nodule     Ulcerative colitis         Past Surgical History:   Procedure Laterality Date    BREAST BIOPSY Left 2024    BREAST LUMPECTOMY Bilateral     BREAST RECONSTRUCTION Bilateral 10/31/2024    Procedure: BILATERAL GOLDILOCKS RECONSTRUCTION BILATERAL  WITH TISSUE EXPANDER PLACEMENT;  Surgeon: Adan Mayorga MD;   [place] [facility] [place]  Service: Plastics;  Laterality: Bilateral;    CARPAL TUNNEL RELEASE Left     CARPAL TUNNEL RELEASE Right 2023    COLONOSCOPY N/A 2014    [facility]    HX OVARIAN CYSTECTOMY      HYSTERECTOMY      INCISION AND DRAINAGE OF WOUND  2015    KNEE ARTHROSCOPY Right 2022    MASTECTOMY W/ SENTINEL NODE BIOPSY Left 10/31/2024    Procedure: bilateral skin sparing mastectomy, left sentinel lymph node biopsy;  Surgeon: Stacy Edouard MD;  [place] [facility] [place];  Service: General;  Laterality: Left;    THYROIDECTOMY, PARTIAL  2002   Oncologic history  patient is a 64-year-old postmenopausal female with hypothyroidism and hypercholesterolemia was noted on her most recent screening mammogram to have an abnormality in the left breast at 9:00 that showed invasive mammary carcinoma grade 3 measuring 9 mm with associated DCIS high-grade ER 91 to 100% LA 40% HER2 negative with a Ki-67 of 35%     patient was referred to Dr. Edouard underwent breast MRI Which showed a 1.6 x 1.5 cm irregular mass at the site of the biopsy and posteriorly there was additional 1.8 x 1.5 cm non-mass enhancement which was suspicious with no axillary or internal mammary adenopathy.  In addition the right breast showed a non-mass enhancement at 9:00 which was considered suspicious.  Patient underwent biopsy of the right breast lesion which thankfully showed PASH and was taken to surgery on 10/31/2024 and underwent bilateral mastectomy with left sentinel node biopsy with the finding of a residual 16 mm grade 3 invasive ductal carcinoma with associated DCIS measuring 20 mm with clear margins and 4 negative sentinel nodes patient had immediate reconstruction.    Oncotype DX was sent to score returned at 13 and of note the LA done by RT-PCR was negative but the ER was positive    Patient is here to discuss adjuvant treatment is doing well postsurgery but reports unusual smell and taste since surgery with no signs of COVID  otherwise    She is  2 para 1 with a miscarriage first childbirth was age 22 she breast-fed for 9 months  Menarche was at age 17 she had a hysterectomy at age 54 and probable menopause a few years later because her ovaries were not removed.  She has had no postmenopausal hormone replacement    Family history is positive for maternal aunt with breast cancer in her 40s she had a second cancer in her 70s and  of metastatic breast cancer.  Paternal grandmother had breast cancer in her 60s her 9 gene [employer] panel was negative and we have reflexed to a 48 gene panel    She is not a smoker drinks 2 mixed drinks a week  She has never had a DVT MI or stroke   She has never had a DEXA scan    Discussed the prognostic and predictive value of the Oncotype DX score which indicates benefit from chemotherapy which is not unexpected in a grade 3 tumor which appears to be more luminal B in nature.    We discussed the use of chemotherapy with Cytoxan Taxotere for 4 cycles using a PICC line.  We talked about the [place] cooling system for limiting hair loss and she is agreeable to all the above.    We will have chemo education and PICC line and see her back in 2 weeks to start her first round of chemotherapy and she knows she will take at least 5 years of hormonal blockade following chemotherapy with no plans for radiation at this time  We will review the results of her extended genetic panel with her when she returns  Current Outpatient Medications on File Prior to Visit   Medication Sig Dispense Refill    ALPRAZolam (Xanax) 0.25 MG tablet Take 1 tablet by mouth 2 (Two) Times a Day As Needed for Anxiety. 20 tablet 0    gabapentin (NEURONTIN) 300 MG capsule Take 2 capsules by mouth Every Night. 60 capsule 1    levothyroxine (SYNTHROID, LEVOTHROID) 25 MCG tablet Take 1 tablet by mouth Daily.      multivitamin (MULTIVITAMIN PO) Take 1 tablet by mouth Daily. HOLD FOR SURGERY      ondansetron (ZOFRAN) 8 MG tablet Take 1 tablet  by mouth 3 (Three) Times a Day As Needed for Nausea or Vomiting. 30 tablet 5    probiotic (CULTURELLE) capsule capsule Take 1 capsule by mouth Daily.      prochlorperazine (COMPAZINE) 10 MG tablet Take 1 tablet by mouth Every 6 (Six) Hours As Needed for Nausea or Vomiting. 30 tablet 1    VITAMIN D PO Take 1 tablet by mouth Daily.      rosuvastatin (CRESTOR) 20 MG tablet Take 1 tablet by mouth Daily. (Patient not taking: Reported on 2/14/2025)      [DISCONTINUED] HYDROcodone-acetaminophen (NORCO) 5-325 MG per tablet Take 1 tablet by mouth Every 6 (Six) Hours As Needed for Moderate Pain. (Patient not taking: Reported on 2/14/2025) 15 tablet 0     No current facility-administered medications on file prior to visit.        ALLERGIES:    Allergies   Allergen Reactions    Tetanus Toxoid Hives    Penicillin G Nausea And Vomiting     Beta lactam allergy details  Antibiotic reaction:  (n/v)  Age at reaction: adult  Dose to reaction time: unknown  Reason for antibiotic: unknown  Epinephrine required for reaction?: unknown  Tolerated antibiotics: unknown   Beta lactam allergy details  Antibiotic reaction: rash  Age at reaction: adult  Dose to reaction time: unknown  Reason for antibiotic: unknown  Epinephrine required for reaction?: no  Tolerated antibiotics: amoxicillin, augmentin            Social History     Socioeconomic History    Marital status:      Spouse name: Cheo   Tobacco Use    Smoking status: Never    Smokeless tobacco: Never   Vaping Use    Vaping status: Never Used   Substance and Sexual Activity    Alcohol use: Yes     Alcohol/week: 1.0 standard drink of alcohol     Types: 1 Glasses of wine per week     Comment: occasionally    Drug use: Never    Sexual activity: Yes     Partners: Male     Birth control/protection: Post-menopausal, Hysterectomy        Family History   Problem Relation Age of Onset    Breast cancer Maternal Aunt 40 - 49        diagnosed in her 40s and mid to late 70s    Diabetes Mother   "   Arthritis Father     Cancer Father     Diabetes Son     [place] Hyperthermia Neg Hx           Objective   Vitals:    02/14/25 0822   BP: 132/84   Pulse: 90   Resp: 16   Temp: 98 °F (36.7 °C)   TempSrc: Oral   SpO2: 97%   Weight: 78.4 kg (172 lb 12.8 oz)   Height: 162.6 cm (64.02\")   PainSc: 0-No pain           2/14/2025     8:21 AM   Current Status   ECOG score 0       Physical Exam    CONSTITUTIONAL:  Vital signs reviewed.  No distress, looks comfortable.  EYES:  Conjunctivae and lids unremarkable.  PERRLA  EARS,NOSE,MOUTH,THROAT:  Ears and nose appear unremarkable.  Lips, teeth, gums appear unremarkable.  RESPIRATORY:  Normal respiratory effort.  Lungs clear to auscultation bilaterally.   BREASTS: S/p bilateral mastectomy with reconstruction  CARDIOVASCULAR:  Normal S1, S2.  No murmurs rubs or gallops.  No significant lower extremity edema.  GASTROINTESTINAL: Abdomen appears unremarkable.    LYMPHATIC:  No cervical, supraclavicular, axillary lymphadenopathy.  SKIN:  Warm.  No rashes.  PSYCHIATRIC:  Normal judgment and insight.  Normal mood and affect.      RECENT LABS:  Results from last 7 days   Lab Units 02/14/25  0810   WBC 10*3/mm3 4.85   NEUTROS ABS 10*3/mm3 3.58   HEMOGLOBIN g/dL 11.6*   HEMATOCRIT % 35.2   PLATELETS 10*3/mm3 510*                     Final Diagnosis  1. Breast, left 9 o'clock position AR edge, core biopsy: (Vision clip)  A. Invasive mammary carcinoma, no special type (ductal), [place] histologic grade 3 (tubules = 3, nuclei = 3,  mitoses = 2), measuring 9 mm maximally, and involving approximately 9 core fragments.  B. Minimal associated ductal carcinoma in situ (DCIS) high nuclear grade with solid and cribriform architecture  and spotty single cell necrosis.  C. No definitive lymphovascular space invasion identified.  Electronically signed by Asia aCm MD on 8/30/2024 at 1550  .Synoptic Checklist  Breast Biomarker Reporting Template (BREAST BIOMARKER REPORTING TEMPLATE - All " Specimens) Protocol posted: 12/13/2023  Test(s) Performed  Estrogen Receptor (ER) Status Positive (greater than 10% of cells demonstrate nuclear positivity)  Percentage of Cells with Nuclear Positivity %  Average Intensity of Staining Strong  Test Type Food and Drug Administration (FDA) cleared (test / vendor): [employer]ice  Primary Antibody SP1  Test(s) Performed  Progesterone Receptor (PgR) Status Positive  Percentage of Cells with Nuclear Positivity 31-40%  Average Intensity of Staining Moderate  Test Type Food and Drug Administration (FDA) cleared (test / vendor): [employer]  Primary Antibody 1E2  Test(s) Performed  HER2 by Immunohistochemistry Negative (Score 1+)  Test Type Food and Drug Administration (FDA) cleared (test / vendor): [employer]  Primary Antibody 4B5  Test(s) Performed Ki-67  Ki-67 Percentage of Positive Nuclei 35 %    Final Diagnosis 10/18  1. Breast, right, 9:00, MRI, biopsy (buckle shaped clip): Benign breast tissue with  -A. Fibroadenomatoid hyperplasia  -B. Pseudoangiomatous stromal hyperplasia of the mammary stroma  -C. Microcalcifications in nonneoplastic tissue  -D. [place] ductal hyperplasia of the usual type  -E. Columnar cell change      Synoptic Checklist 10/31/24  INVASIVE CARCINOMA OF THE BREAST: Resection (INVASIVE CARCINOMA OF THE BREAST: RESECTION - All Specimens) 8th Edition  - Protocol posted: 6/19/2024  SPECIMEN  Procedure Total mastectomy  Specimen Laterality Left  .  TUMOR  Histologic Type Invasive carcinoma of no special type (ductal)  Histologic Grade ([place] Histologic Score)  Glandular (Acinar) / Tubular Differentiation Score 3  Nuclear Pleomorphism Score 3  Mitotic Rate Score 3  Overall Grade Grade 3 (scores of 8 or 9)  Tumor Size Greatest dimension of largest invasive focus (Millimeters): 16 mm  Tumor Focality Single focus of invasive carcinoma  Ductal Carcinoma In Situ (DCIS) Present  Negative for extensive intraductal component (EIC)  Size (Extent) of DCIS Estimated  "size (extent) of DCIS is at least (Millimeters): 20 mm  Architectural Patterns Cribriform  Solid  Nuclear Grade Grade III (high)  Necrosis Present, central (expansive \"comedo\" necrosis)  Lymphatic and / or Vascular Invasion Not identified  Dermal Lymphatic and / or Vascular Invasion Not identified  Microcalcifications Present in non-neoplastic tissue  Treatment Effect in the Breast No known presurgical therapy  .  MARGINS  Margin Status for Invasive Carcinoma All margins negative for invasive carcinoma  Distance from Invasive Carcinoma to Closest Margin Greater than: 20 mm  Distance from Invasive Carcinoma to Anterior Margin Greater than: 20 mm  Distance from Invasive Carcinoma to Posterior Margin Greater than: 20 mm  Margin Status for DCIS All margins negative for DCIS  Distance from DCIS to Closest Margin    Closest Margin(s) to DCIS Anterior  Distance from DCIS to Anterior Margin 4 mm  Distance from DCIS to Posterior Margin Greater than: 20 mm  .  REGIONAL LYMPH NODES  Regional Lymph Node Status All regional lymph nodes negative for tumor  Total Number of Lymph Nodes Examined (sentinel and non-sentinel) 4  Number of [place] Nodes Examined 4  .  pTNM CLASSIFICATION (AJCC 8th Edition)  Reporting of pT, pN, and (when applicable) pM categories is based on information available to the pathologist at the time the report is issued. As  per the AJCC (Chapter 1, 8th Ed.) it is the managing physician's responsibility to establish the final pathologic stage based upon all pertinent  information, including but potentially not limited to this pathology report.  pT Category pT1c  pN Category pN0  N Suffix (sn)  .  SPECIAL STUDIES  Estrogen Receptor (ER) Status Positive (greater than 10% of cells demonstrate nuclear positivity)  Percentage of Cells with Nuclear Positivity %  Progesterone Receptor (PgR) Status Positive  Percentage of Cells with Nuclear Positivity 31-40%  HER2 (by immunohistochemistry) Negative (Score " 1+)  Ki-67 Percentage of Positive Nuclei 35 %    Assessment & Plan   1.pT1cN0 grade 3 invasive ductal carcinoma left breast ER/AZ positive HER2 negative with a Ki-67 of 35% mastectomy and sentinel node biopsy with clear margins 10/31/2024  Oncotype DX score of 33  TCx4 to start on 12/13/2024 with Neulasta support via PICC line  Had fever and significant bone pains presumably from Neulasta on body although she could have had a coincidental viral infection though COVID-negative  Consider switching to alternative growth factor next cycle  Cycle # 2TC with Neulasta on body on 1/3/2025  1/24/2025: Proceed with Cycle 3 TC with neulasta support. Overall tolerating this well. The headaches could be related to sinus congestion, advised trying flonase and reaching out if no improvement.  Persistent nausea for 10 days after each treatment will cut back on the Taxotere and add olanzapine 2.5 for 10 days-PICC line removed on 2/14/2025    2.  Positive family history for breast cancer in a paternal aunt and grandmother 9 gene panel -48 gene panel neg    3.hypercholesterolemia on treatment    4.  Hypothyroidism on treatment    5.  Recent onset parosmia and dysgeusia since surgery?  Related to anesthesia versus asymptomatic COVID  1 week history of meralgia paresthetica right thigh?  Etiology had similar symptoms in the left thigh 20 years ago-May need to see neurology again    6.  Mild elevations of LFTs predating chemo-watch for now  Improved    7. Meralgia parasthetica on gabapentin  to see neurology    Plan:  Proceed with cycle 4 TC with neulasta on pro 10% decrease in Taxotere  DC PICC line  Olanzapine 2.5 daily for 10 days   return to clinic in 4 weeks for MD visit to discuss hormonal blockade  Instructed to reach out sooner with any concerns.     I spent 40 total minutes, face-to-face, caring for Fátima today. Greater than 50% of this time involved counseling and/or coordination of care as documented within this note.

## 2025-03-04 DIAGNOSIS — C50.919 MALIGNANT NEOPLASM OF FEMALE BREAST, UNSPECIFIED ESTROGEN RECEPTOR STATUS, UNSPECIFIED LATERALITY, UNSPECIFIED SITE OF BREAST: ICD-10-CM

## 2025-03-05 RX ORDER — GABAPENTIN 300 MG/1
600 CAPSULE ORAL NIGHTLY
Qty: 60 CAPSULE | Refills: 0 | Status: SHIPPED | OUTPATIENT
Start: 2025-03-05

## 2025-03-14 ENCOUNTER — LAB (OUTPATIENT)
Dept: LAB | Facility: HOSPITAL | Age: 65
End: 2025-03-14
Payer: COMMERCIAL

## 2025-03-14 ENCOUNTER — OFFICE VISIT (OUTPATIENT)
Dept: ONCOLOGY | Facility: CLINIC | Age: 65
End: 2025-03-14
Payer: COMMERCIAL

## 2025-03-14 VITALS
DIASTOLIC BLOOD PRESSURE: 72 MMHG | RESPIRATION RATE: 16 BRPM | HEART RATE: 78 BPM | WEIGHT: 170.6 LBS | OXYGEN SATURATION: 97 % | HEIGHT: 64 IN | SYSTOLIC BLOOD PRESSURE: 112 MMHG | BODY MASS INDEX: 29.12 KG/M2 | TEMPERATURE: 97.3 F

## 2025-03-14 DIAGNOSIS — C50.919 MALIGNANT NEOPLASM OF BREAST IN FEMALE, ESTROGEN RECEPTOR POSITIVE, UNSPECIFIED LATERALITY, UNSPECIFIED SITE OF BREAST: Primary | ICD-10-CM

## 2025-03-14 DIAGNOSIS — Z17.0 MALIGNANT NEOPLASM OF LEFT BREAST IN FEMALE, ESTROGEN RECEPTOR POSITIVE, UNSPECIFIED SITE OF BREAST: ICD-10-CM

## 2025-03-14 DIAGNOSIS — Z17.0 MALIGNANT NEOPLASM OF BREAST IN FEMALE, ESTROGEN RECEPTOR POSITIVE, UNSPECIFIED LATERALITY, UNSPECIFIED SITE OF BREAST: Primary | ICD-10-CM

## 2025-03-14 DIAGNOSIS — C50.912 MALIGNANT NEOPLASM OF LEFT BREAST IN FEMALE, ESTROGEN RECEPTOR POSITIVE, UNSPECIFIED SITE OF BREAST: ICD-10-CM

## 2025-03-14 LAB
BASOPHILS # BLD AUTO: 0.06 10*3/MM3 (ref 0–0.2)
BASOPHILS NFR BLD AUTO: 1.3 % (ref 0–1.5)
DEPRECATED RDW RBC AUTO: 51.2 FL (ref 37–54)
EOSINOPHIL # BLD AUTO: 0.25 10*3/MM3 (ref 0–0.4)
EOSINOPHIL NFR BLD AUTO: 5.5 % (ref 0.3–6.2)
ERYTHROCYTE [DISTWIDTH] IN BLOOD BY AUTOMATED COUNT: 15.4 % (ref 12.3–15.4)
HCT VFR BLD AUTO: 39.8 % (ref 34–46.6)
HGB BLD-MCNC: 12.9 G/DL (ref 12–15.9)
IMM GRANULOCYTES # BLD AUTO: 0.03 10*3/MM3 (ref 0–0.05)
IMM GRANULOCYTES NFR BLD AUTO: 0.7 % (ref 0–0.5)
LYMPHOCYTES # BLD AUTO: 1.39 10*3/MM3 (ref 0.7–3.1)
LYMPHOCYTES NFR BLD AUTO: 30.5 % (ref 19.6–45.3)
MCH RBC QN AUTO: 29.2 PG (ref 26.6–33)
MCHC RBC AUTO-ENTMCNC: 32.4 G/DL (ref 31.5–35.7)
MCV RBC AUTO: 90 FL (ref 79–97)
MONOCYTES # BLD AUTO: 0.45 10*3/MM3 (ref 0.1–0.9)
MONOCYTES NFR BLD AUTO: 9.9 % (ref 5–12)
NEUTROPHILS NFR BLD AUTO: 2.37 10*3/MM3 (ref 1.7–7)
NEUTROPHILS NFR BLD AUTO: 52.1 % (ref 42.7–76)
NRBC BLD AUTO-RTO: 0 /100 WBC (ref 0–0.2)
PLATELET # BLD AUTO: 338 10*3/MM3 (ref 140–450)
PMV BLD AUTO: 8.7 FL (ref 6–12)
RBC # BLD AUTO: 4.42 10*6/MM3 (ref 3.77–5.28)
WBC NRBC COR # BLD AUTO: 4.55 10*3/MM3 (ref 3.4–10.8)

## 2025-03-14 PROCEDURE — 85025 COMPLETE CBC W/AUTO DIFF WBC: CPT

## 2025-03-14 PROCEDURE — 36415 COLL VENOUS BLD VENIPUNCTURE: CPT

## 2025-03-14 RX ORDER — CHOLECALCIFEROL (VITAMIN D3) 25 MCG
1000 TABLET ORAL DAILY
COMMUNITY

## 2025-03-14 RX ORDER — ONDANSETRON 8 MG/1
8 TABLET, FILM COATED ORAL
COMMUNITY
Start: 2024-12-12

## 2025-03-14 RX ORDER — ANASTROZOLE 1 MG/1
1 TABLET ORAL DAILY
Qty: 90 TABLET | Refills: 3 | Status: SHIPPED | OUTPATIENT
Start: 2025-03-14 | End: 2026-03-09

## 2025-03-14 NOTE — PROGRESS NOTES
Subjective     REASON FOR CONSULTATION:  pT1cN0 grade 3 ER/NH+ her 2 neg left breast ca invasive ductal post mastectomy and SLNB   Provide an opinion on any further workup or treatment                             History of Present Illness patient is a 64-year-old lady with a high risk node-negative breast cancer grade 3 with a high Oncotype score treated with  Cytoxan Taxotere for 4 cycles    She is here after cycle 4 of Cytoxan Taxotere and is doing well overall.  She has no residual neuropathy except she  has persistent meralgia paresthetica on her right thigh and she is taking gabapentin 300 at bedtime to help her sleep because of the discomfort and is scheduled to see neurology in April which was the soonest appointment they could get her    She is concerned about weight gain that she has had through chemo and concerned that she will continue to lose weight and is talking to her primary care doctor about weight loss drugs but I think she can hold off for now as she is not that heavy and that she may lose some weight after that she stopped the steroids for her chemo    We have prescribed Arimidex as adjuvant treatment for at least 5 years based on the fact she has normal bone density.The side effects and toxicities of the Aromatase inhibitors was discussed with the patient including, hot flashes, mood swings and hair thinning.Significant arthralgias and worsening bone density were also discussed. Baseline bone density evaluation was ordered.      Past Medical History:   Diagnosis Date    Breast cancer     LEFT BREAST    Hyperlipidemia     Hypotension     PONV (postoperative nausea and vomiting)     SEVERE    Syncope     Thyroid nodule     Ulcerative colitis         Past Surgical History:   Procedure Laterality Date    BREAST BIOPSY Left 2024    BREAST LUMPECTOMY Bilateral     BREAST RECONSTRUCTION Bilateral 10/31/2024    Procedure: BILATERAL GOLDILOCKS RECONSTRUCTION BILATERAL  WITH TISSUE EXPANDER PLACEMENT;   Surgeon: Adan Mayorga MD;  [place] [facility] [place] [facility] [place]  Service: Plastics;  Laterality: Bilateral;    CARPAL TUNNEL RELEASE Left     CARPAL TUNNEL RELEASE Right 2023    COLONOSCOPY N/A 2014    [facility]    HX OVARIAN CYSTECTOMY      HYSTERECTOMY      INCISION AND DRAINAGE OF WOUND  2015    KNEE ARTHROSCOPY Right 2022    MASTECTOMY W/ SENTINEL NODE BIOPSY Left 10/31/2024    Procedure: bilateral skin sparing mastectomy, left sentinel lymph node biopsy;  Surgeon: Stacy Edouard MD;  Location: [facility] [place] [facility] [place];  Service: General;  Laterality: Left;    THYROIDECTOMY, PARTIAL  2002   Oncologic history  patient is a 64-year-old postmenopausal female with hypothyroidism and hypercholesterolemia was noted on her most recent screening mammogram to have an abnormality in the left breast at 9:00 that showed invasive mammary carcinoma grade 3 measuring 9 mm with associated DCIS high-grade ER 91 to 100% MN 40% HER2 negative with a Ki-67 of 35%     patient was referred to Dr. Edouard underwent breast MRI Which showed a 1.6 x 1.5 cm irregular mass at the site of the biopsy and posteriorly there was additional 1.8 x 1.5 cm non-mass enhancement which was suspicious with no axillary or internal mammary adenopathy.  In addition the right breast showed a non-mass enhancement at 9:00 which was considered suspicious.  Patient underwent biopsy of the right breast lesion which thankfully showed PASH and was taken to surgery on 10/31/2024 and underwent bilateral mastectomy with left sentinel node biopsy with the finding of a residual 16 mm grade 3 invasive ductal carcinoma with associated DCIS measuring 20 mm with clear margins and 4 negative sentinel nodes patient had immediate reconstruction.    Oncotype DX was sent to score returned at 13 and of note the MN done by RT-PCR was negative but the ER was positive    Patient is here to discuss adjuvant treatment is doing well postsurgery but reports unusual smell and taste since  surgery with no signs of COVID otherwise    She is  2 para 1 with a miscarriage first childbirth was age 22 she breast-fed for 9 months  Menarche was at age 17 she had a hysterectomy at age 54 and probable menopause a few years later because her ovaries were not removed.  She has had no postmenopausal hormone replacement    Family history is positive for maternal aunt with breast cancer in her 40s she had a second cancer in her 70s and  of metastatic breast cancer.  Paternal grandmother had breast cancer in her 60s her 9 gene [employer] panel was negative and we have reflexed to a 48 gene panel    She is not a smoker drinks 2 mixed drinks a week  She has never had a DVT MI or stroke   She has never had a DEXA scan    Discussed the prognostic and predictive value of the Oncotype DX score which indicates benefit from chemotherapy which is not unexpected in a grade 3 tumor which appears to be more luminal B in nature.    We discussed the use of chemotherapy with Cytoxan Taxotere for 4 cycles using a PICC line.  We talked about the [place] cooling system for limiting hair loss and she is agreeable to all the above.    We will have chemo education and PICC line and see her back in 2 weeks to start her first round of chemotherapy and she knows she will take at least 5 years of hormonal blockade following chemotherapy with no plans for radiation at this time  We will review the results of her extended genetic panel with her when she returns  Current Outpatient Medications on File Prior to Visit   Medication Sig Dispense Refill    ALPRAZolam (Xanax) 0.25 MG tablet Take 1 tablet by mouth 2 (Two) Times a Day As Needed for Anxiety. 20 tablet 0    cholecalciferol (Vitamin D, Cholecalciferol,) 25 MCG (1000 UT) tablet Take 1 tablet by mouth Daily.      gabapentin (NEURONTIN) 300 MG capsule TAKE 2 CAPSULES BY MOUTH EVERY NIGHT 60 capsule 0    HYDROcodone-acetaminophen (NORCO) 5-325 MG per tablet Take 1 tablet by mouth Every  6 (Six) Hours As Needed for Moderate Pain. 15 tablet 0    levothyroxine (SYNTHROID, LEVOTHROID) 25 MCG tablet Take 1 tablet by mouth Daily.      multivitamin (MULTIVITAMIN PO) Take 1 tablet by mouth Daily. HOLD FOR SURGERY      OLANZapine (zyPREXA) 2.5 MG tablet Take nightly x 7 days starting day of chemo infusion 14 tablet 0    ondansetron (ZOFRAN) 8 MG tablet Take 1 tablet by mouth.      probiotic (CULTURELLE) capsule capsule Take 1 capsule by mouth Daily.      VITAMIN D PO Take 1 tablet by mouth Daily.      prochlorperazine (COMPAZINE) 10 MG tablet Take 1 tablet by mouth Every 6 (Six) Hours As Needed for Nausea or Vomiting. (Patient not taking: Reported on 3/14/2025) 30 tablet 1    rosuvastatin (CRESTOR) 20 MG tablet Take 1 tablet by mouth Daily. (Patient not taking: Reported on 3/14/2025)       No current facility-administered medications on file prior to visit.        ALLERGIES:    Allergies   Allergen Reactions    Tetanus Toxoid Hives    Penicillin G Nausea And Vomiting     Beta lactam allergy details  Antibiotic reaction:  (n/v)  Age at reaction: adult  Dose to reaction time: unknown  Reason for antibiotic: unknown  Epinephrine required for reaction?: unknown  Tolerated antibiotics: unknown   Beta lactam allergy details  Antibiotic reaction: rash  Age at reaction: adult  Dose to reaction time: unknown  Reason for antibiotic: unknown  Epinephrine required for reaction?: no  Tolerated antibiotics: amoxicillin, augmentin            Social History     Socioeconomic History    Marital status:      Spouse name: Cheo   Tobacco Use    Smoking status: Never    Smokeless tobacco: Never   Vaping Use    Vaping status: Never Used   Substance and Sexual Activity    Alcohol use: Yes     Alcohol/week: 1.0 standard drink of alcohol     Types: 1 Glasses of wine per week     Comment: occasionally    Drug use: Never    Sexual activity: Yes     Partners: Male     Birth control/protection: Post-menopausal, Hysterectomy     "    Family History   Problem Relation Age of Onset    Breast cancer Maternal Aunt 40 - 49        diagnosed in her 40s and mid to late 70s    Diabetes Mother     Arthritis Father     Cancer Father     Diabetes Son     [place] Hyperthermia Neg Hx           Objective   Vitals:    03/14/25 0914   BP: 112/72   Pulse: 78   Resp: 16   Temp: 97.3 °F (36.3 °C)   TempSrc: Oral   SpO2: 97%   Weight: 77.4 kg (170 lb 9.6 oz)   Height: 162.6 cm (64.02\")   PainSc: 0-No pain           3/14/2025     9:13 AM   Current Status   ECOG score 0       Physical Exam    CONSTITUTIONAL:  Vital signs reviewed.  No distress, looks comfortable.  EYES:  Conjunctivae and lids unremarkable.  PERRLA  EARS,NOSE,MOUTH,THROAT:  Ears and nose appear unremarkable.  Lips, teeth, gums appear unremarkable.  RESPIRATORY:  Normal respiratory effort.  Lungs clear to auscultation bilaterally.   BREASTS: S/p bilateral mastectomy with reconstruction  CARDIOVASCULAR:  Normal S1, S2.  No murmurs rubs or gallops.  No significant lower extremity edema.  GASTROINTESTINAL: Abdomen appears unremarkable.    LYMPHATIC:  No cervical, supraclavicular, axillary lymphadenopathy.  SKIN:  Warm.  No rashes.  PSYCHIATRIC:  Normal judgment and insight.  Normal mood and affect.      RECENT LABS:  Results from last 7 days   Lab Units 03/14/25  0905   WBC 10*3/mm3 4.55   NEUTROS ABS 10*3/mm3 2.37   HEMOGLOBIN g/dL 12.9   HEMATOCRIT % 39.8   PLATELETS 10*3/mm3 338                     Final Diagnosis  1. Breast, left 9 o'clock position AR edge, core biopsy: (Vision clip)  A. Invasive mammary carcinoma, no special type (ductal), Ray histologic grade 3 (tubules = 3, nuclei = 3,  mitoses = 2), measuring 9 mm maximally, and involving approximately 9 core fragments.  B. Minimal associated ductal carcinoma in situ (DCIS) high nuclear grade with solid and cribriform architecture  and spotty single cell necrosis.  C. No definitive lymphovascular space invasion identified.  Electronically " signed by Asia Cam MD on 8/30/2024 at 1550  .Synoptic Checklist  Breast Biomarker Reporting Template (BREAST BIOMARKER REPORTING TEMPLATE - All Specimens) Protocol posted: 12/13/2023  Test(s) Performed  Estrogen Receptor (ER) Status Positive (greater than 10% of cells demonstrate nuclear positivity)  Percentage of Cells with Nuclear Positivity %  Average Intensity of Staining Strong  Test Type Food and Drug Administration (FDA) cleared (test / vendor): [employer]  Primary Antibody SP1  Test(s) Performed  Progesterone Receptor (PgR) Status Positive  Percentage of Cells with Nuclear Positivity 31-40%  Average Intensity of Staining Moderate  Test Type Food and Drug Administration (FDA) cleared (test / vendor): [employer]  Primary Antibody 1E2  Test(s) Performed  HER2 by Immunohistochemistry Negative (Score 1+)  Test Type Food and Drug Administration (FDA) cleared (test / vendor): [employer]  Primary Antibody 4B5  Test(s) Performed Ki-67  Ki-67 Percentage of Positive Nuclei 35 %    Final Diagnosis 10/18  1. Breast, right, 9:00, MRI, biopsy (buckle shaped clip): Benign breast tissue with  -A. Fibroadenomatoid hyperplasia  -B. Pseudoangiomatous stromal hyperplasia of the mammary stroma  -C. Microcalcifications in nonneoplastic tissue  -D. Florid ductal hyperplasia of the usual type  -E. Columnar cell change      Synoptic Checklist 10/31/24  INVASIVE CARCINOMA OF THE BREAST: Resection (INVASIVE CARCINOMA OF THE BREAST: RESECTION - All Specimens) 8th Edition  - Protocol posted: 6/19/2024  SPECIMEN  Procedure Total mastectomy  Specimen Laterality Left  .  TUMOR  Histologic Type Invasive carcinoma of no special type (ductal)  Histologic Grade ([place] Histologic Score)  Glandular (Acinar) / Tubular Differentiation Score 3  Nuclear Pleomorphism Score 3  Mitotic Rate Score 3  Overall Grade Grade 3 (scores of 8 or 9)  Tumor Size Greatest dimension of largest invasive focus (Millimeters): 16 mm  Tumor Focality Single  "focus of invasive carcinoma  Ductal Carcinoma In Situ (DCIS) Present  Negative for extensive intraductal component (EIC)  Size (Extent) of DCIS Estimated size (extent) of DCIS is at least (Millimeters): 20 mm  Architectural Patterns Cribriform  Solid  Nuclear Grade Grade III (high)  Necrosis Present, central (expansive \"comedo\" necrosis)  Lymphatic and / or Vascular Invasion Not identified  Dermal Lymphatic and / or Vascular Invasion Not identified  Microcalcifications Present in non-neoplastic tissue  Treatment Effect in the Breast No known presurgical therapy  .  MARGINS  Margin Status for Invasive Carcinoma All margins negative for invasive carcinoma  Distance from Invasive Carcinoma to Closest Margin Greater than: 20 mm  Distance from Invasive Carcinoma to Anterior Margin Greater than: 20 mm  Distance from Invasive Carcinoma to Posterior Margin Greater than: 20 mm  Margin Status for DCIS All margins negative for DCIS  Distance from DCIS to Closest Margin    Closest Margin(s) to DCIS Anterior  Distance from DCIS to Anterior Margin 4 mm  Distance from DCIS to Posterior Margin Greater than: 20 mm  .  REGIONAL LYMPH NODES  Regional Lymph Node Status All regional lymph nodes negative for tumor  Total Number of Lymph Nodes Examined (sentinel and non-sentinel) 4  Number of [place] Nodes Examined 4  .  pTNM CLASSIFICATION (AJCC 8th Edition)  Reporting of pT, pN, and (when applicable) pM categories is based on information available to the pathologist at the time the report is issued. As  per the AJCC (Chapter 1, 8th Ed.) it is the managing physician's responsibility to establish the final pathologic stage based upon all pertinent  information, including but potentially not limited to this pathology report.  pT Category pT1c  pN Category pN0  N Suffix (sn)  .  SPECIAL STUDIES  Estrogen Receptor (ER) Status Positive (greater than 10% of cells demonstrate nuclear positivity)  Percentage of Cells with Nuclear Positivity " %  Progesterone Receptor (PgR) Status Positive  Percentage of Cells with Nuclear Positivity 31-40%  HER2 (by immunohistochemistry) Negative (Score 1+)  Ki-67 Percentage of Positive Nuclei 35 %    Assessment & Plan   1.pT1cN0 grade 3 invasive ductal carcinoma left breast ER/VT positive HER2 negative with a Ki-67 of 35% bilat mastectomy and sentinel node biopsy with clear margins 10/31/2024  Oncotype DX score of 33  TCx4 to start on 12/13/2024 with Neulasta support via PICC line  Had fever and significant bone pains presumably from Neulasta on body although she could have had a coincidental viral infection though COVID-negative  Consider switching to alternative growth factor next cycle  Cycle # 2TC with Neulasta on body on 1/3/2025  1/24/2025: Proceed with Cycle 3 TC with neulasta support. Overall tolerating this well. The headaches could be related to sinus congestion, advised trying flonase and reaching out if no improvement.  Persistent nausea for 10 days after each treatment will cut back on the Taxotere and add olanzapine 2.5 for 10 days-PICC line removed on 2/14/2025  Arimidex started in 3/25 no radiation needed    2.  Positive family history for breast cancer in a paternal aunt and grandmother 9 gene panel -48 gene panel neg    3.hypercholesterolemia on treatment    4.  Hypothyroidism on treatment    5.  Recent onset parosmia and dysgeusia since surgery?  Related to anesthesia versus asymptomatic COVID  1 week history of meralgia paresthetica right thigh?  Etiology had similar symptoms in the left thigh 20 years ago-May need to see neurology again    6.  Mild elevations of LFTs predating chemo-watch for now  Improved    7. Meralgia parasthetica on gabapentin  to see neurology    Plan:  1.  Arimidex 1 mg daily for at least 5 years  2.  Return in 4 months to see me and call in the interim with any side effects

## 2025-04-03 ENCOUNTER — PATIENT OUTREACH (OUTPATIENT)
Dept: OTHER | Facility: HOSPITAL | Age: 65
End: 2025-04-03
Payer: COMMERCIAL

## 2025-04-03 NOTE — PROGRESS NOTES
Chart reviewed for survivorship information. She is following with the lymphedema clinic with next appointment on 4/16. She is due for her next mammogram 10/2025 and will follow with Dr. Edouard and Dr. Hemphill for future breast cancer care. She had her last colonoscopy 2014, Cervical cancer screening unknown, and was never a smoker    Will mail survivorship booklet that discusses NCCN recommendations for all cancer survivors of 150 minutes/week moderate intensity exercise, achieve and maintain a healthy weight, plants-based whole-foods diet, avoid tobacco and second hand smoke, avoid alcohol or minimize alcohol intake - no more than 1 drink in a day for adults.    Called patient and LVM encouraging her to call with any future needs that arise.

## 2025-04-23 ENCOUNTER — HOSPITAL ENCOUNTER (OUTPATIENT)
Dept: PHYSICAL THERAPY | Facility: HOSPITAL | Age: 65
Setting detail: THERAPIES SERIES
Discharge: HOME | End: 2025-04-23
Payer: COMMERCIAL

## 2025-04-23 DIAGNOSIS — C50.919 MALIGNANT NEOPLASM OF FEMALE BREAST, UNSPECIFIED ESTROGEN RECEPTOR STATUS, UNSPECIFIED LATERALITY, UNSPECIFIED SITE OF BREAST: Primary | ICD-10-CM

## 2025-04-23 DIAGNOSIS — Z90.13 S/P BILATERAL MASTECTOMY: ICD-10-CM

## 2025-04-23 DIAGNOSIS — I89.0 LYMPHEDEMA: ICD-10-CM

## 2025-04-23 DIAGNOSIS — Z91.89 AT RISK FOR LYMPHEDEMA: ICD-10-CM

## 2025-04-23 PROCEDURE — 93702 BIS XTRACELL FLUID ANALYSIS: CPT

## 2025-04-23 PROCEDURE — 97535 SELF CARE MNGMENT TRAINING: CPT

## 2025-04-23 NOTE — THERAPY RE-EVALUATION
Physical Therapy Lymphedema Re-Evaluation  [facility]     Patient Name: Fátima Julien  : 1960  MRN: [id number]  Today's Date: 2025      Visit Date: 2025    Visit Dx:    ICD-10-CM ICD-9-CM   1. Malignant neoplasm of female breast, unspecified estrogen receptor status, unspecified laterality, unspecified site of breast  C50.919 174.9   2. Lymphedema  I89.0 457.1   3. S/P bilateral mastectomy  Z90.13 V45.71   4. At risk for lymphedema  Z91.89 V49.89       Patient Active Problem List   Diagnosis    Malignant neoplasm of female breast    Breast cancer        Past Medical History:   Diagnosis Date    Breast cancer     LEFT BREAST    Hyperlipidemia     Hypotension     PONV (postoperative nausea and vomiting)     SEVERE    Syncope     Thyroid nodule     Ulcerative colitis         Past Surgical History:   Procedure Laterality Date    BREAST BIOPSY Left     BREAST LUMPECTOMY Bilateral     BREAST RECONSTRUCTION Bilateral 10/31/2024    Procedure: BILATERAL GOLDILOCKS RECONSTRUCTION BILATERAL  WITH TISSUE EXPANDER PLACEMENT;  Surgeon: Adan Mayorga MD;  [place] [facility] [place] [facility] [place]  Service: Plastics;  Laterality: Bilateral;    CARPAL TUNNEL RELEASE Left     CARPAL TUNNEL RELEASE Right     COLONOSCOPY N/A     [facility] OVARIAN CYSTECTOMY      HYSTERECTOMY      INCISION AND DRAINAGE OF WOUND  2015    KNEE ARTHROSCOPY Right     MASTECTOMY W/ SENTINEL NODE BIOPSY Left 10/31/2024    Procedure: bilateral skin sparing mastectomy, left sentinel lymph node biopsy;  Surgeon: Stacy Edouard MD;  Location: [facility];  Service: General;  Laterality: Left;    THYROIDECTOMY, PARTIAL         Visit Dx:    ICD-10-CM ICD-9-CM   1. Malignant neoplasm of female breast, unspecified estrogen receptor status, unspecified laterality, unspecified site of breast  C50.919 174.9   2. Lymphedema  I89.0 457.1   3. S/P bilateral mastectomy  Z90.13 V45.71   4. At risk for lymphedema  Z91.89  V49.89            Lymphedema       Row Name 04/23/25 1300             Subjective Pain    Able to rate subjective pain? yes  -KA      Pre-Treatment Pain Level 0  -KA      Post-Treatment Pain Level 0  -KA         Subjective    Subjective Comments Pt reports that she underwent her final reconstruction surgery on 4/4/25.  Still under precautions, no heavy lifting until 6 weeks after surgery.  Completed chemo.  Has not seen any swelling, but wears arm sleeve about 4 days per week preventatively.  Also receiving monthly lymphatic massage closer to her home.  -KA         Lymphedema Assessment    Lymphedema Classification LUE:;secondary;at risk/stage 0  -KA      Lymphedema Cancer Related Sx bilateral;simple mastectomy;left;sentinel node biopsy  -KA      Lymphedema Surgery Comments 10/31/2024  -KA      Lymph Nodes Removed # 4  -KA      Positive Lymph Nodes # 0  -KA      Stage of Cancer Stage I  -KA      Chemo Received yes  -KA      Chemo Treatments #/Timeframe 4  -KA      Adverse Chemo Reactions/Complication Completed 2/14/25  -KA      Radiation Therapy Received no  -KA      Infections or Cellulitis? no  -KA      Lymphedema Assessment Comments No obvious edema at this time  -KA         Lymphedema Edema Assessment    Edema Assessment Comment no obvious edema, negative for MARCELINA and seroma at this time  -KA         L-Dex Bioimpedence Screening    L-Dex Measurement Extremity LUE  -KA      L-Dex Patient Position Standing  -KA      L-Dex UE Dominate Side Right  -KA      L-Dex UE At Risk Side Left  -KA      L-Dex UE Pre Surgical Value No  -KA      L-Dex UE Score 3.5  -KA      L-Dex UE Baseline Score 6  -KA      L-Dex UE Value Change -2.5  -KA      L-Dex UE Comment WNLs  -KA      $ L-Dex Charge yes  -KA                User Key  (r) = Recorded By, (t) = Taken By, (c) = Cosigned By      Initials Name Provider Type    Keya Giraldo, PT Physical Therapist                                    Therapy Education  Education Details:  reviewed s/s of lymphedema, steps to prevention, bioimpedance results and interpretation, use of compression sleeve  Given: Symptoms/condition management, Edema management  Program: Reinforced  How Provided: Verbal  Provided to: Patient  Level of Understanding: Verbalized  41898 - PT Self Care/Mgmt Minutes: 10       OP Exercises       Row Name 04/23/25 1300             Subjective    Subjective Comments Pt reports that she underwent her final reconstruction surgery on 4/4/25.  Still under precautions, no heavy lifting until 6 weeks after surgery.  Completed chemo.  Has not seen any swelling, but wears arm sleeve about 4 days per week preventatively.  Also receiving monthly lymphatic massage closer to her home.  -KA         Subjective Pain    Able to rate subjective pain? yes  -KA      Pre-Treatment Pain Level 0  -KA      Post-Treatment Pain Level 0  -KA                User Key  (r) = Recorded By, (t) = Taken By, (c) = Cosigned By      Initials Name Provider Type    Keya Giraldo, PT Physical Therapist                                 PT OP Goals       Row Name 04/23/25 1300          Long Term Goals    LTG Date to Achieve 04/15/25  -KA     LTG 1 Pt will maintain bioimpedance L-Dex WNLs  -KA     LTG 1 Progress Ongoing  -KA     LTG 1 Progress Comments WNLs today at 3.5  -KA     LTG 2 Pt will obtain properly fitting compression arm sleeve and verbalize wear schedule.  -KA     LTG 2 Progress Met  -KA     LTG 3 Pt will verbalize s/s of lymphedema and steps for prevention.  -KA     LTG 3 Progress Ongoing  -KA     LTG 3 Progress Comments Reviewed today  -KA        Time Calculation    PT Goal Re-Cert Due Date 07/22/25  -               User Key  (r) = Recorded By, (t) = Taken By, (c) = Cosigned By      Initials Name Provider Type    Keya Giraldo, PT Physical Therapist                     PT Assessment/Plan       Row Name 04/23/25 1328          PT Assessment    Functional Limitations Other (comment)  -OSVALDO      Impairments Impaired lymphatic circulation  -     Assessment Comments Pt returns for 3 month breast care follow up and repeat bioimpedance.  No s/s of lymphedema or concerns about breast or involved UE in interim. Bioimpedance score is WNLs and stable at 3.5.  Pt has met 1/3 LTG at this time and remains appropriate for skilled breast care physical therapy for lymphedema surveillance and prevention.  -KA     Rehab Potential Good  -KA     Patient/caregiver participated in establishment of treatment plan and goals Yes  -KA     Patient would benefit from skilled therapy intervention Yes  -KA        PT Plan    PT Frequency Other (comment)  -KA     Predicted Duration of Therapy Intervention (PT) Repeat bioimpedance in 3 months per protocol unless indicated sooner  -KA     Planned CPT's? PT EVAL LOW COMPLEXITY: 68204;PT RE-EVAL: 98281;PT THER PROC EA 15 MIN: 66544;PT THER ACT EA 15 MIN: 49623;PT MANUAL THERAPY EA 15 MIN: 92606;PT NEUROMUSC RE-EDUCATION EA 15 MIN: 87131;PT SELF CARE/HOME MGMT/TRAIN EA 15: 03741;PT BIS XTRACELL FLUID ANALYSIS: 84087  -KA     PT Plan Comments Repeat bioimpedance in 3 months per protocol unless indicated sooner  -KA               User Key  (r) = Recorded By, (t) = Taken By, (c) = Cosigned By      Initials Name Provider Type    Keya Giraldo, PT Physical Therapist                     The patient had a 3 month SOZO measurement which I reviewed today. The score is WNL, see in EMR. Bioimpedance spectroscopy helps identify the onset of lymphedema in an arm or leg before patients experience noticeable swelling. Research has shown that the early detection of lymphedema using L-Dex combined with treatment can reduce progression to chronic lymphedema by 95% in breast cancer patients. Whenever possible, patients are tested for baseline L-Dex score before cancer treatment begins and then are reassessed during regular follow-up visits using the SOZO device. Otherwise, this can be started  postoperatively and continued during regular follow-up visits. If the patient's L-Dex score increases above normal levels, that is a sign that lymphedema is developing and a referral is made to physical therapy for further evaluation and early compression treatment. Lymphedema assessment with the SOZO L-Dex score is recommended to be done every 3 months for the first 3 years and then every 6 months for years 4 and 5 followed by annually afterwards.              Time Calculation:   Start Time: 1312  Stop Time: 1327  Time Calculation (min): 15 min  Timed Charges  37210 - PT Self Care/Mgmt Minutes: 10  Untimed Charges  91033 - PT Bioimpedence Rx Minutes: 5  Total Minutes  Timed Charges Total Minutes: 10  Untimed Charges Total Minutes: 5   Total Minutes: 15   Therapy Charges for Today       Code Description Service Date Service Provider Modifiers Qty    29057085164 HC PT BIS XTRACELL FLUID ANALYSIS 4/23/2025 Keya Hoover, PT  1    46877194763 HC PT SELF CARE/MGMT/TRAIN EA 15 MIN 4/23/2025 Keya Hoover, PT GP 1                      Keya Hoover, PT  4/23/2025

## 2025-05-21 ENCOUNTER — TELEPHONE (OUTPATIENT)
Dept: ONCOLOGY | Facility: CLINIC | Age: 65
End: 2025-05-21
Payer: COMMERCIAL

## 2025-05-21 NOTE — TELEPHONE ENCOUNTER
Provider: Dr. Hemphill  Caller: Fátima Julien  Relationship to Patient: Self  Call Back Phone Number: [phone]  Reason for Call: Pt called stated she wants to speak to Alisa regarding medication side effects. Please call to advise.

## 2025-05-22 NOTE — TELEPHONE ENCOUNTER
Pt returned my call. States since starting on her arimidex she has had joint and bone pain from her hips to her lower legs. It does seem to be worse on the left than the right. Denies any leg swelling or calf pain, states it is definitely bone and joint pain. Advised to hold arimidex for one month and begin taking tart cherry extract supplement. She will call back in about 1-2 weeks to see if pain has improved.

## 2025-05-28 ENCOUNTER — TELEPHONE (OUTPATIENT)
Dept: ONCOLOGY | Facility: CLINIC | Age: 65
End: 2025-05-28
Payer: COMMERCIAL

## 2025-05-28 NOTE — TELEPHONE ENCOUNTER
Provider: Joby Hendersoner: patient  Relationship to Patient: self  Call Back Phone Number: [phone]   Reason for Call: Pt has chicken pox wanted to let Dr. Foster know. Should pt continue to take antiviral medication?

## 2025-05-30 NOTE — TELEPHONE ENCOUNTER
Discussed with Dr Hemphill and returned pt's call and let her know it was fine to take the antiviral medication for her chicken pox. She v/u.

## 2025-06-10 ENCOUNTER — TELEPHONE (OUTPATIENT)
Dept: ONCOLOGY | Facility: CLINIC | Age: 65
End: 2025-06-10
Payer: COMMERCIAL

## 2025-06-10 NOTE — TELEPHONE ENCOUNTER
Provider: Dr. Hemphill  Caller: Fátima Julien  Relationship to Patient: Self  Call Back Phone Number: [phone]  Reason for Call: Pt called and stated she has been of her Arimidex for 10 days and everything is the same. Please call to advise.

## 2025-06-11 RX ORDER — METHYLPREDNISOLONE 4 MG/1
TABLET ORAL
Qty: 21 TABLET | Refills: 0 | Status: SHIPPED | OUTPATIENT
Start: 2025-06-11

## 2025-06-11 NOTE — TELEPHONE ENCOUNTER
Returned pt's call and left my direct number [phone] for  her to return my call for more details on symptoms.

## 2025-06-11 NOTE — TELEPHONE ENCOUNTER
Reena returned my call. States the pain has not improved at all being off her AI and states it actually feels more muscular now than before when she described it more as bone /joint pain. Still states left leg is worse than right, but not redness or swelling. Legs are painful to touch. Reviewed with Dr Hemphill and caporol dose anson prescribed. She will continue to hold her aromatase inhibitor. Called pt back and left her a voicemail with details and advised to return call with any further questions. Also to call back next week to update on symptoms.

## 2025-06-23 ENCOUNTER — HOSPITAL ENCOUNTER (OUTPATIENT)
Dept: CARDIOLOGY | Facility: HOSPITAL | Age: 65
Discharge: HOME | End: 2025-06-23
Admitting: INTERNAL MEDICINE
Payer: COMMERCIAL

## 2025-06-23 ENCOUNTER — TELEPHONE (OUTPATIENT)
Dept: ONCOLOGY | Facility: CLINIC | Age: 65
End: 2025-06-23
Payer: COMMERCIAL

## 2025-06-23 DIAGNOSIS — C50.919 MALIGNANT NEOPLASM OF FEMALE BREAST, UNSPECIFIED ESTROGEN RECEPTOR STATUS, UNSPECIFIED LATERALITY, UNSPECIFIED SITE OF BREAST: ICD-10-CM

## 2025-06-23 DIAGNOSIS — M79.605 PAIN AND SWELLING OF LEFT LOWER EXTREMITY: Primary | ICD-10-CM

## 2025-06-23 DIAGNOSIS — M79.89 PAIN AND SWELLING OF LEFT LOWER EXTREMITY: Primary | ICD-10-CM

## 2025-06-23 DIAGNOSIS — M79.89 PAIN AND SWELLING OF LEFT LOWER EXTREMITY: ICD-10-CM

## 2025-06-23 DIAGNOSIS — M79.605 PAIN AND SWELLING OF LEFT LOWER EXTREMITY: ICD-10-CM

## 2025-06-23 DIAGNOSIS — R45.89 ANXIETY ABOUT HEALTH: ICD-10-CM

## 2025-06-23 LAB
BH CV LOWER VASCULAR LEFT COMMON FEMORAL AUGMENT: NORMAL
BH CV LOWER VASCULAR LEFT COMMON FEMORAL COMPETENT: NORMAL
BH CV LOWER VASCULAR LEFT COMMON FEMORAL COMPRESS: NORMAL
BH CV LOWER VASCULAR LEFT COMMON FEMORAL PHASIC: NORMAL
BH CV LOWER VASCULAR LEFT COMMON FEMORAL SPONT: NORMAL
BH CV LOWER VASCULAR LEFT DISTAL FEMORAL COMPRESS: NORMAL
BH CV LOWER VASCULAR LEFT GASTRONEMIUS COMPRESS: NORMAL
BH CV LOWER VASCULAR LEFT GREATER SAPH AK COMPRESS: NORMAL
BH CV LOWER VASCULAR LEFT GREATER SAPH BK COMPRESS: NORMAL
BH CV LOWER VASCULAR LEFT LESSER SAPH COMPRESS: NORMAL
BH CV LOWER VASCULAR LEFT MID FEMORAL AUGMENT: NORMAL
BH CV LOWER VASCULAR LEFT MID FEMORAL COMPETENT: NORMAL
BH CV LOWER VASCULAR LEFT MID FEMORAL COMPRESS: NORMAL
BH CV LOWER VASCULAR LEFT MID FEMORAL PHASIC: NORMAL
BH CV LOWER VASCULAR LEFT MID FEMORAL SPONT: NORMAL
BH CV LOWER VASCULAR LEFT PERONEAL COMPRESS: NORMAL
BH CV LOWER VASCULAR LEFT POPLITEAL AUGMENT: NORMAL
BH CV LOWER VASCULAR LEFT POPLITEAL COMPETENT: NORMAL
BH CV LOWER VASCULAR LEFT POPLITEAL COMPRESS: NORMAL
BH CV LOWER VASCULAR LEFT POPLITEAL PHASIC: NORMAL
BH CV LOWER VASCULAR LEFT POPLITEAL SPONT: NORMAL
BH CV LOWER VASCULAR LEFT POSTERIOR TIBIAL COMPRESS: NORMAL
BH CV LOWER VASCULAR LEFT PROFUNDA FEMORAL COMPRESS: NORMAL
BH CV LOWER VASCULAR LEFT PROXIMAL FEMORAL COMPRESS: NORMAL
BH CV LOWER VASCULAR LEFT SAPHENOFEMORAL JUNCTION COMPRESS: NORMAL
BH CV LOWER VASCULAR RIGHT COMMON FEMORAL AUGMENT: NORMAL
BH CV LOWER VASCULAR RIGHT COMMON FEMORAL COMPETENT: NORMAL
BH CV LOWER VASCULAR RIGHT COMMON FEMORAL COMPRESS: NORMAL
BH CV LOWER VASCULAR RIGHT COMMON FEMORAL PHASIC: NORMAL
BH CV LOWER VASCULAR RIGHT COMMON FEMORAL SPONT: NORMAL
BH CV VAS PRELIMINARY FINDINGS SCRIPTING: 1

## 2025-06-23 PROCEDURE — 93971 EXTREMITY STUDY: CPT | Performed by: SURGERY

## 2025-06-23 PROCEDURE — 93971 EXTREMITY STUDY: CPT

## 2025-06-23 NOTE — TELEPHONE ENCOUNTER
Pt confirmed apt today for STAT doppler at [facility] at 4:15, arrive at 4:00.sent to billing. Gave Dr Hemphill's cell phone number.

## 2025-06-26 NOTE — TELEPHONE ENCOUNTER
Late entry: Pt followed up re: muscle and joint aches and pains. States they did improve some on the steroid dose pack, but pain returned once off steroids. States left leg is worse than the right with pain extending down into her calf. Stat doppler ordered to evaluate for DVT.

## 2025-07-01 ENCOUNTER — TELEMEDICINE (OUTPATIENT)
Dept: PSYCHIATRY | Facility: HOSPITAL | Age: 65
End: 2025-07-01
Payer: COMMERCIAL

## 2025-07-01 DIAGNOSIS — Z17.0 MALIGNANT NEOPLASM OF LEFT BREAST IN FEMALE, ESTROGEN RECEPTOR POSITIVE, UNSPECIFIED SITE OF BREAST: Primary | ICD-10-CM

## 2025-07-01 DIAGNOSIS — C50.912 MALIGNANT NEOPLASM OF LEFT BREAST IN FEMALE, ESTROGEN RECEPTOR POSITIVE, UNSPECIFIED SITE OF BREAST: Primary | ICD-10-CM

## 2025-07-01 DIAGNOSIS — F43.23 ADJUSTMENT DISORDER WITH MIXED ANXIETY AND DEPRESSED MOOD: ICD-10-CM

## 2025-07-01 RX ORDER — GABAPENTIN 100 MG/1
100 CAPSULE ORAL 3 TIMES DAILY
Qty: 90 CAPSULE | Refills: 2 | Status: SHIPPED | OUTPATIENT
Start: 2025-07-01 | End: 2026-07-01

## 2025-07-01 NOTE — PROGRESS NOTES
"Behavioral Oncology: Initial Evaluation  In Person    Subjective  Patient ID: Fátima Julien is a 64 y.o. female is seen face to face in the [facility].    CC: \"Increase in anxiety\"    HPI/ Interval History:   Patient is a 64-year-old female diagnosed with grade 3 invasive ductal carcinoma, hormone positive, in September 2024, bilateral mastectomy October 21, not suspecting need for additional treatment but with high oncotype leading to recommendation for adjuvant treatment.  Completed TC x 4 in February, initiated on Arimidex in March of this year.  Did not require radiation.  Reports initial tolerability of Arimidex, unfortunately later experiencing pain in left leg, persisting over several months.  Arimidex has since been discontinued approximately 6 weeks ago with appreciated improvement in plan, unfortunately with reduced appetite with weight loss. Finds this to be odd after lifetime of love for food, enjoyment of eating, etc. Couples with this drastic increase in anxiety, wanting to crawl out of skin, even having a panic attack. Describes feeling anxious first thing in the morning, in drastic contrast to baseline humor, optimism, positivity.  Recent medical history reviewed; while initially denying associated changes, patient does acknowledge vision changes with diagnosis of macular hole, anticipating surgery later this month.  Agrees this has greatly changed visual perception.  Furthermore states she is no longer taking gabapentin, as this is not necessary for pain. States she was taking 300 mg twice daily from November through February, then briefly alongside surgery.  Generally reports desire to remain off medications that are not recommended long-term.  While not directly correlated, patient acknowledges these could have coincided with increase in current symptoms.    Previous psychiatric history reviewed; patient reports history of depressive episode x 1, feeling " physically ill following mother's passing approximately 20 years ago.  Eventually initiated sertraline through PCP, low dose, taking this for approximately 1 month with near resolution of symptoms.  Continued for approximately 1 year and has been off ever since.  Was prescribed alprazolam 0.25 mg as needed alongside diagnosis, last filled in January, and has taken these on occasion with recent experiences of anxiety.     Current PHQ-9 reviewed to be subthreshold  with total score of 8. Pt endorses sx of reduced interests and pleasure, disrupted sleep, poor energy, poor appetite, and impacted concentration and focus.  Denies past or current thoughts of self-harm or suicide.  Is sleeping well, appx 7 hours at night. Reports regular schedule 9:30-10 PM, up at 5:30-6 AM. Typically wakes feeling rested, not lethargic. Naps occasionally during day, more since chemo. Works for home and able to snooze if needed. Concentration is complicated, reporting perception of memory issues although feeling this are on par with concerns of others her age. Focus is acceptable. Continues to make lists and finds this is very helpful. Appetite remains low, continuing to endorse early satiety, although this is beginning to improve. Has lost appx 8 pounds. Does endorse nausea from moment of wakeup, relating this to morning sickness with pregnancy. Has taken ondansetron at times, stating this coincides with discontinuation of Arimidex. Has appointment upcoming with Dr. Hemphill in appx 2 weeks. Denies recent addition or subtraction of any additional medications. Does feel more clumsy, paying attention to where walking, shoes wearing.     Symptoms of anxiety reviewed; pt endorses feeling nervous, anxious, or on edge, not being able to stop or control worrying, worrying too much about different things, trouble relaxing, restlessness, and agitation, irritability. Does endorse anxiety regarding being off arimidex, feeling unprotected. Identifies  fears of recurrence, while not ruminating regarding these thoughts often. Denies specific focus, more stating brain is not shutting off. Agrees symptoms are mostly somatic. Reports feeling bothered by this a couple times a day. Currently reports breath focus, gratitude practice during these moments with limited benefit - more helpful at night     Pain has almost completely resolved. Does report some discomfort after significant walking yesterday, generally not intrusive. Doppler negative.     Past Psychiatric History:  Pt reports history of depression, recognizing this after mother passed away appx 20 years ago. Reports feeling physically ill. Didn't want to get out of bed. Had horrible pain, concerned she had fibromyalgia, was crying and states this was out of character. Eventually initiated sertraline for appx one year with benefit, then discontinuing and remaining off since this time. Additionally has taken xanax alongside treatment, finding benefit to this with limited PRN use. Currently taking with benefit. No history of connection to counseling.    Family Psychiatric History:  Denied, although recognized this likely would not have been diagnosed or discussed.    Medical History:  Generally describes self as being very healthy; continues on multivitamin, levothyroxine, tart cherry,   Does have a macular hole in vision, anticipating surgery on July 24. Has had some dizziness with distorted vision and agrees this may have coincided with increase in symptoms as of recently.     Substance Use History:  Never smoked, no THC, other drug use.  Does enjoy appx 1 cocktail every week to two weeks - not enjoying this as of recently.    Social History:  Pt is a college graduate, beginning work in [place] as a freshman and continuing in this. 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 Previously on the road a lot, reduced since diagnosis. Generally working 8-5. 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 Worked as able during chemo.    Pt is  to  of 43 years. One adult son, 3 dogs, 2 cats. Robust network of supportive friends and loved ones.    Exam: As above    Recent Labs Reviewed:  Hospital Outpatient Visit on 06/23/2025   Component Date Value    Right Common Femoral Spo* 06/23/2025 Y     Right Common Femoral Com* 06/23/2025 Y     Right Common Femoral Pha* 06/23/2025 Y     Right Common Femoral Com* 06/23/2025 C     Right Common Femoral Aug* 06/23/2025 Y     Left Common Femoral Spont 06/23/2025 Y     Left Common Femoral Comp* 06/23/2025 Y     Left Common Femoral Phas* 06/23/2025 Y     Left Common Femoral Comp* 06/23/2025 C     Left Common Femoral Augm* 06/23/2025 Y     Left Saphenofemoral Junc* 06/23/2025 C     Left Profunda Femoral Co* 06/23/2025 C     Left Proximal Femoral Co* 06/23/2025 C     Left Mid Femoral Spont 06/23/2025 Y     Left Mid Femoral Compete* 06/23/2025 Y     Left Mid Femoral Phasic 06/23/2025 Y     Left Mid Femoral Compress 06/23/2025 C     Left Mid Femoral Augment 06/23/2025 Y     Left Distal Femoral Comp* 06/23/2025 C     Left Popliteal Spont 06/23/2025 Y     Left Popliteal Competent 06/23/2025 Y     Left Popliteal Phasic 06/23/2025 Y     Left Popliteal Compress 06/23/2025 C     Left Popliteal Augment 06/23/2025 Y     Left Posterior Tibial Co* 06/23/2025 C     Left Peroneal Compress 06/23/2025 C     Left Gastronemius Compre* 06/23/2025 C     Left Greater Saph AK Com* 06/23/2025 C     Left Greater Saph BK Com* 06/23/2025 C     Left Lesser Saph Compress 06/23/2025 C     BH CV VAS PRELIMINARY FI* 06/23/2025 1.0    Labs reviewed    Current Psychotropic Medications:  No current psychotropic medications    Plan of Care/ Medical Decision Making  Patient with recent increase in anxiety, likely adjustment disorder, although with rule out regarding major depressive disorder, recurrent, versus generalized anxiety disorder.  Discussed current plans of  discussing symptoms with Dr. Hemphill given recent medication change, next steps regarding aromatase inhibitor.  Additionally considered potential impact of vision changes in regard to nausea, appetite, anxiety.  Support pursuing this actively with current team.  Medication history reviewed; considered readdition of SSRI/SNRI as opportunity to address current symptoms, noting somatic presentation of previous depressive disorder.  Patient hesitant to add daily medication until better understanding how symptoms fit into current picture.  Recent discontinuation of gabapentin reviewed; specifically shared off label use for anxiety, questions as to whether gabapentin was masking the symptoms and thus discontinuation may have coincided with their increase.  Considered readdition of this versus alprazolam, low dose, for short-term use, considering SSRI if need for more long-term strategy.  Will initiate gabapentin 100 mg 3 times daily.    Follow-up scheduled in 4 weeks to assess response.    Diagnoses and all orders for this visit:    1. Malignant neoplasm of left breast in female, estrogen receptor positive, unspecified site of breast (Primary)  -     gabapentin (Neurontin) 100 MG capsule; Take 1 capsule by mouth 3 (Three) Times a Day.  Dispense: 90 capsule; Refill: 2    2. Adjustment disorder with mixed anxiety and depressed mood  -     gabapentin (Neurontin) 100 MG capsule; Take 1 capsule by mouth 3 (Three) Times a Day.  Dispense: 90 capsule; Refill: 2    I spent 69 minutes caring for Fátima on this date of service. This time includes time spent by me in the following activities: preparing for the visit, reviewing tests, obtaining and/or reviewing a separately obtained history, performing a medically appropriate examination and/or evaluation, counseling and educating the patient/family/caregiver, ordering medications, tests, or procedures, referring and communicating with other health care professionals, and documenting  information in the medical record.

## 2025-07-09 ENCOUNTER — OFFICE VISIT (OUTPATIENT)
Dept: ONCOLOGY | Facility: CLINIC | Age: 65
End: 2025-07-09
Payer: COMMERCIAL

## 2025-07-09 ENCOUNTER — TELEPHONE (OUTPATIENT)
Dept: ONCOLOGY | Facility: CLINIC | Age: 65
End: 2025-07-09
Payer: COMMERCIAL

## 2025-07-09 ENCOUNTER — LAB (OUTPATIENT)
Dept: LAB | Facility: HOSPITAL | Age: 65
End: 2025-07-09
Payer: COMMERCIAL

## 2025-07-09 VITALS
HEART RATE: 73 BPM | BODY MASS INDEX: 27.93 KG/M2 | WEIGHT: 163.6 LBS | DIASTOLIC BLOOD PRESSURE: 68 MMHG | HEIGHT: 64 IN | OXYGEN SATURATION: 98 % | TEMPERATURE: 97.9 F | SYSTOLIC BLOOD PRESSURE: 109 MMHG

## 2025-07-09 DIAGNOSIS — Z17.0 MALIGNANT NEOPLASM OF BREAST IN FEMALE, ESTROGEN RECEPTOR POSITIVE, UNSPECIFIED LATERALITY, UNSPECIFIED SITE OF BREAST: ICD-10-CM

## 2025-07-09 DIAGNOSIS — C50.919 MALIGNANT NEOPLASM OF FEMALE BREAST, UNSPECIFIED ESTROGEN RECEPTOR STATUS, UNSPECIFIED LATERALITY, UNSPECIFIED SITE OF BREAST: Primary | ICD-10-CM

## 2025-07-09 DIAGNOSIS — C50.919 MALIGNANT NEOPLASM OF BREAST IN FEMALE, ESTROGEN RECEPTOR POSITIVE, UNSPECIFIED LATERALITY, UNSPECIFIED SITE OF BREAST: ICD-10-CM

## 2025-07-09 LAB
ALBUMIN SERPL-MCNC: 4.5 G/DL (ref 3.5–5.2)
ALBUMIN/GLOB SERPL: 1.6 G/DL
ALP SERPL-CCNC: 79 U/L (ref 39–117)
ALT SERPL W P-5'-P-CCNC: 31 U/L (ref 1–33)
ANION GAP SERPL CALCULATED.3IONS-SCNC: 8.9 MMOL/L (ref 5–15)
AST SERPL-CCNC: 28 U/L (ref 1–32)
BASOPHILS # BLD AUTO: 0.02 10*3/MM3 (ref 0–0.2)
BASOPHILS NFR BLD AUTO: 0.5 % (ref 0–1.5)
BILIRUB SERPL-MCNC: 0.2 MG/DL (ref 0–1.2)
BUN SERPL-MCNC: 10.8 MG/DL (ref 8–23)
BUN/CREAT SERPL: 14.4 (ref 7–25)
CALCIUM SPEC-SCNC: 10 MG/DL (ref 8.6–10.5)
CHLORIDE SERPL-SCNC: 103 MMOL/L (ref 98–107)
CO2 SERPL-SCNC: 26.1 MMOL/L (ref 22–29)
CREAT SERPL-MCNC: 0.75 MG/DL (ref 0.57–1)
DEPRECATED RDW RBC AUTO: 42.9 FL (ref 37–54)
EGFRCR SERPLBLD CKD-EPI 2021: 90.1 ML/MIN/1.73
EOSINOPHIL # BLD AUTO: 0.05 10*3/MM3 (ref 0–0.4)
EOSINOPHIL NFR BLD AUTO: 1.3 % (ref 0.3–6.2)
ERYTHROCYTE [DISTWIDTH] IN BLOOD BY AUTOMATED COUNT: 13.6 % (ref 12.3–15.4)
GLOBULIN UR ELPH-MCNC: 2.8 GM/DL
GLUCOSE SERPL-MCNC: 91 MG/DL (ref 65–99)
HCT VFR BLD AUTO: 43.4 % (ref 34–46.6)
HGB BLD-MCNC: 14.5 G/DL (ref 12–15.9)
IMM GRANULOCYTES # BLD AUTO: 0.01 10*3/MM3 (ref 0–0.05)
IMM GRANULOCYTES NFR BLD AUTO: 0.3 % (ref 0–0.5)
LYMPHOCYTES # BLD AUTO: 1.38 10*3/MM3 (ref 0.7–3.1)
LYMPHOCYTES NFR BLD AUTO: 36.1 % (ref 19.6–45.3)
MCH RBC QN AUTO: 28.5 PG (ref 26.6–33)
MCHC RBC AUTO-ENTMCNC: 33.4 G/DL (ref 31.5–35.7)
MCV RBC AUTO: 85.3 FL (ref 79–97)
MONOCYTES # BLD AUTO: 0.24 10*3/MM3 (ref 0.1–0.9)
MONOCYTES NFR BLD AUTO: 6.3 % (ref 5–12)
NEUTROPHILS NFR BLD AUTO: 2.12 10*3/MM3 (ref 1.7–7)
NEUTROPHILS NFR BLD AUTO: 55.5 % (ref 42.7–76)
NRBC BLD AUTO-RTO: 0 /100 WBC (ref 0–0.2)
PLATELET # BLD AUTO: 293 10*3/MM3 (ref 140–450)
PMV BLD AUTO: 8.7 FL (ref 6–12)
POTASSIUM SERPL-SCNC: 5.6 MMOL/L (ref 3.5–5.2)
PROT SERPL-MCNC: 7.3 G/DL (ref 6–8.5)
RBC # BLD AUTO: 5.09 10*6/MM3 (ref 3.77–5.28)
SODIUM SERPL-SCNC: 138 MMOL/L (ref 136–145)
WBC NRBC COR # BLD AUTO: 3.82 10*3/MM3 (ref 3.4–10.8)

## 2025-07-09 PROCEDURE — 85025 COMPLETE CBC W/AUTO DIFF WBC: CPT

## 2025-07-09 PROCEDURE — 80053 COMPREHEN METABOLIC PANEL: CPT

## 2025-07-09 PROCEDURE — 36415 COLL VENOUS BLD VENIPUNCTURE: CPT

## 2025-07-09 RX ORDER — EXEMESTANE 25 MG/1
25 TABLET ORAL DAILY
Qty: 90 TABLET | Refills: 3 | Status: SHIPPED | OUTPATIENT
Start: 2025-07-09 | End: 2026-07-04

## 2025-07-09 NOTE — TELEPHONE ENCOUNTER
Called pt to review elevated potassium level and inquire about any medication or supplement changes. Left voicemail to return my call to my direct number.

## 2025-07-09 NOTE — PROGRESS NOTES
Subjective     REASON FOR CONSULTATION:  pT1cN0 grade 3 ER/MA+ her 2 neg left breast ca invasive ductal post mastectomy and SLNB   Provide an opinion on any further workup or treatment                             History of Present Illness patient is a 64-year-old lady with a high risk node-negative breast cancer grade 3 with a high Oncotype score treated with  Cytoxan Taxotere for 4 cycles    She had a hard couple of months initially with intolerance of Arimidex which we discontinued with a lot of muscle pains and aches which responded partially to a steroid Dosepak    Then she developed chickenpox  Then she developed a macular hole which is being surgically treated    She is back today to discuss second line hormonal blockade and I have recommended Aromasin.  Thankfully bone density in December was normal    She continues with sciatic type pain on the left which is responded to gabapentin and the meralgia paresthetica she had on her right thigh has resolved  She is related disaster    Past Medical History:   Diagnosis Date    Breast cancer     LEFT BREAST    Hyperlipidemia     Hypotension     PONV (postoperative nausea and vomiting)     SEVERE    Syncope     Thyroid nodule     Ulcerative colitis         Past Surgical History:   Procedure Laterality Date    BREAST BIOPSY Left 2024    BREAST LUMPECTOMY Bilateral     BREAST RECONSTRUCTION Bilateral 10/31/2024    Procedure: BILATERAL GOLDILOCKS RECONSTRUCTION BILATERAL  WITH TISSUE EXPANDER PLACEMENT;  Surgeon: Adan Mayorga MD;  Location: [facility] [place];  Service: Plastics;  Laterality: Bilateral;    CARPAL TUNNEL RELEASE Left     CARPAL TUNNEL RELEASE Right 2023    COLONOSCOPY N/A 2014    [facility]    HX OVARIAN CYSTECTOMY      HYSTERECTOMY      INCISION AND DRAINAGE OF WOUND  2015    KNEE ARTHROSCOPY Right 2022    MASTECTOMY W/ SENTINEL NODE BIOPSY Left 10/31/2024    Procedure: bilateral skin sparing mastectomy, left sentinel lymph node biopsy;   Surgeon: Stacy Eduoard MD;  Location: [facility];  Service: General;  Laterality: Left;    THYROIDECTOMY, PARTIAL     Oncologic history  patient is a 64-year-old postmenopausal female with hypothyroidism and hypercholesterolemia was noted on her most recent screening mammogram to have an abnormality in the left breast at 9:00 that showed invasive mammary carcinoma grade 3 measuring 9 mm with associated DCIS high-grade ER 91 to 100% UT 40% HER2 negative with a Ki-67 of 35%     patient was referred to Dr. Edouard underwent breast MRI Which showed a 1.6 x 1.5 cm irregular mass at the site of the biopsy and posteriorly there was additional 1.8 x 1.5 cm non-mass enhancement which was suspicious with no axillary or internal mammary adenopathy.  In addition the right breast showed a non-mass enhancement at 9:00 which was considered suspicious.  Patient underwent biopsy of the right breast lesion which thankfully showed PASH and was taken to surgery on 10/31/2024 and underwent bilateral mastectomy with left sentinel node biopsy with the finding of a residual 16 mm grade 3 invasive ductal carcinoma with associated DCIS measuring 20 mm with clear margins and 4 negative sentinel nodes patient had immediate reconstruction.    Oncotype DX was sent to score returned at 13 and of note the UT done by RT-PCR was negative but the ER was positive    Patient is here to discuss adjuvant treatment is doing well postsurgery but reports unusual smell and taste since surgery with no signs of COVID otherwise    She is  2 para 1 with a miscarriage first childbirth was age 22 she breast-fed for 9 months  Menarche was at age 17 she had a hysterectomy at age 54 and probable menopause a few years later because her ovaries were not removed.  She has had no postmenopausal hormone replacement    Family history is positive for maternal aunt with breast cancer in her 40s she had a second cancer in her 70s and  of metastatic  breast cancer.  Paternal grandmother had breast cancer in her 60s her 9 gene [employer] panel was negative and we have reflexed to a 48 gene panel    She is not a smoker drinks 2 mixed drinks a week  She has never had a DVT MI or stroke   She has never had a DEXA scan    Discussed the prognostic and predictive value of the Oncotype DX score which indicates benefit from chemotherapy which is not unexpected in a grade 3 tumor which appears to be more luminal B in nature.    We discussed the use of chemotherapy with Cytoxan Taxotere for 4 cycles using a PICC line.  We talked about the [place] cooling system for limiting hair loss and she is agreeable to all the above.    We will have chemo education and PICC line and see her back in 2 weeks to start her first round of chemotherapy and she knows she will take at least 5 years of hormonal blockade following chemotherapy with no plans for radiation at this time  We will review the results of her extended genetic panel with her when she returns  Current Outpatient Medications on File Prior to Visit   Medication Sig Dispense Refill    ALPRAZolam (Xanax) 0.25 MG tablet Take 1 tablet by mouth 2 (Two) Times a Day As Needed for Anxiety. 20 tablet 0    cholecalciferol (Vitamin D, Cholecalciferol,) 25 MCG (1000 UT) tablet Take 1 tablet by mouth Daily.      gabapentin (Neurontin) 100 MG capsule Take 1 capsule by mouth 3 (Three) Times a Day. 90 capsule 2    HYDROcodone-acetaminophen (NORCO) 5-325 MG per tablet Take 1 tablet by mouth Every 6 (Six) Hours As Needed for Moderate Pain. 15 tablet 0    levothyroxine (SYNTHROID, LEVOTHROID) 25 MCG tablet Take 1 tablet by mouth Daily.      methylPREDNISolone (MEDROL) 4 MG dose pack Take as directed on package instructions. 21 tablet 0    multivitamin (MULTIVITAMIN PO) Take 1 tablet by mouth Daily. HOLD FOR SURGERY      ondansetron (ZOFRAN) 8 MG tablet Take 1 tablet by mouth.      probiotic (CULTURELLE) capsule capsule Take 1 capsule by mouth  "Daily.      VITAMIN D PO Take 1 tablet by mouth Daily.      anastrozole (Arimidex) 1 MG tablet Take 1 tablet by mouth Daily for 360 days. (Patient not taking: Reported on 7/9/2025) 90 tablet 3     No current facility-administered medications on file prior to visit.        ALLERGIES:    Allergies   Allergen Reactions    Tetanus Toxoid Hives    Penicillin G Nausea And Vomiting     Beta lactam allergy details  Antibiotic reaction:  (n/v)  Age at reaction: adult  Dose to reaction time: unknown  Reason for antibiotic: unknown  Epinephrine required for reaction?: unknown  Tolerated antibiotics: unknown   Beta lactam allergy details  Antibiotic reaction: rash  Age at reaction: adult  Dose to reaction time: unknown  Reason for antibiotic: unknown  Epinephrine required for reaction?: no  Tolerated antibiotics: amoxicillin, augmentin            Social History     Socioeconomic History    Marital status:      Spouse name: Cheo   Tobacco Use    Smoking status: Never    Smokeless tobacco: Never   Vaping Use    Vaping status: Never Used   Substance and Sexual Activity    Alcohol use: Yes     Alcohol/week: 1.0 standard drink of alcohol     Types: 1 Glasses of wine per week     Comment: occasionally    Drug use: Never    Sexual activity: Yes     Partners: Male     Birth control/protection: Post-menopausal, Hysterectomy        Family History   Problem Relation Age of Onset    Breast cancer Maternal Aunt 40 - 49        diagnosed in her 40s and mid to late 70s    Diabetes Mother     Arthritis Father     Cancer Father     Diabetes Son     Malig Hyperthermia Neg Hx           Objective   Vitals:    07/09/25 1029   BP: 109/68   Pulse: 73   Temp: 97.9 °F (36.6 °C)   TempSrc: Oral   SpO2: 98%   Weight: 74.2 kg (163 lb 9.6 oz)   Height: 162.6 cm (64.02\")   PainSc: 0-No pain           7/9/2025    10:30 AM   Current Status   ECOG score 0       Physical Exam    CONSTITUTIONAL:  Vital signs reviewed.  No distress, looks comfortable.  EYES: "  Conjunctivae and lids unremarkable.  PERRLA  EARS,NOSE,MOUTH,THROAT:  Ears and nose appear unremarkable.  Lips, teeth, gums appear unremarkable.  RESPIRATORY:  Normal respiratory effort.  Lungs clear to auscultation bilaterally.   BREASTS: S/p bilateral mastectomy with reconstruction  CARDIOVASCULAR:  Normal S1, S2.  No murmurs rubs or gallops.  No significant lower extremity edema.  GASTROINTESTINAL: Abdomen appears unremarkable.    LYMPHATIC:  No cervical, supraclavicular, axillary lymphadenopathy.  SKIN:  Warm.  No rashes.  PSYCHIATRIC:  Normal judgment and insight.  Normal mood and affect.      RECENT LABS:  Results from last 7 days   Lab Units 07/09/25  1022   WBC 10*3/mm3 3.82   NEUTROS ABS 10*3/mm3 2.12   HEMOGLOBIN g/dL 14.5   HEMATOCRIT % 43.4   PLATELETS 10*3/mm3 293                     Final Diagnosis  1. Breast, left 9 o'clock position AR edge, core biopsy: (Vision clip)  A. Invasive mammary carcinoma, no special type (ductal), [place] histologic grade 3 (tubules = 3, nuclei = 3,  mitoses = 2), measuring 9 mm maximally, and involving approximately 9 core fragments.  B. Minimal associated ductal carcinoma in situ (DCIS) high nuclear grade with solid and cribriform architecture  and spotty single cell necrosis.  C. No definitive lymphovascular space invasion identified.  Electronically signed by Asia Cam MD on 8/30/2024 at 1550  .Synoptic Checklist  Breast Biomarker Reporting Template (BREAST BIOMARKER REPORTING TEMPLATE - All Specimens) Protocol posted: 12/13/2023  Test(s) Performed  Estrogen Receptor (ER) Status Positive (greater than 10% of cells demonstrate nuclear positivity)  Percentage of Cells with Nuclear Positivity %  Average Intensity of Staining Strong  Test Type Food and Drug Administration (FDA) cleared (test / vendor): [place]  Primary Antibody SP1  Test(s) Performed  Progesterone Receptor (PgR) Status Positive  Percentage of Cells with Nuclear Positivity 31-40%  Average  "Intensity of Staining Moderate  Test Type Food and Drug Administration (FDA) cleared (test / vendor): [place]  Primary Antibody 1E2  Test(s) Performed  HER2 by Immunohistochemistry Negative (Score 1+)  Test Type Food and Drug Administration (FDA) cleared (test / vendor): [employer]  Primary Antibody 4B5  Test(s) Performed Ki-67  Ki-67 Percentage of Positive Nuclei 35 %    Final Diagnosis 10/18  1. Breast, right, 9:00, MRI, biopsy (buckle shaped clip): Benign breast tissue with  -A. Fibroadenomatoid hyperplasia  -B. Pseudoangiomatous stromal hyperplasia of the mammary stroma  -C. Microcalcifications in nonneoplastic tissue  -D. [place] ductal hyperplasia of the usual type  -E. Columnar cell change      Synoptic Checklist 10/31/24  INVASIVE CARCINOMA OF THE BREAST: Resection (INVASIVE CARCINOMA OF THE BREAST: RESECTION - All Specimens) 8th Edition  - Protocol posted: 6/19/2024  SPECIMEN  Procedure Total mastectomy  Specimen Laterality Left  .  TUMOR  Histologic Type Invasive carcinoma of no special type (ductal)  Histologic Grade ([place] Histologic Score)  Glandular (Acinar) / Tubular Differentiation Score 3  Nuclear Pleomorphism Score 3  Mitotic Rate Score 3  Overall Grade Grade 3 (scores of 8 or 9)  Tumor Size Greatest dimension of largest invasive focus (Millimeters): 16 mm  Tumor Focality Single focus of invasive carcinoma  Ductal Carcinoma In Situ (DCIS) Present  Negative for extensive intraductal component (EIC)  Size (Extent) of DCIS Estimated size (extent) of DCIS is at least (Millimeters): 20 mm  Architectural Patterns Cribriform  Solid  Nuclear Grade Grade III (high)  Necrosis Present, central (expansive \"comedo\" necrosis)  Lymphatic and / or Vascular Invasion Not identified  Dermal Lymphatic and / or Vascular Invasion Not identified  Microcalcifications Present in non-neoplastic tissue  Treatment Effect in the Breast No known presurgical therapy  .  MARGINS  Margin Status for Invasive Carcinoma All margins " negative for invasive carcinoma  Distance from Invasive Carcinoma to Closest Margin Greater than: 20 mm  Distance from Invasive Carcinoma to Anterior Margin Greater than: 20 mm  Distance from Invasive Carcinoma to Posterior Margin Greater than: 20 mm  Margin Status for DCIS All margins negative for DCIS  Distance from DCIS to Closest Margin    Closest Margin(s) to DCIS Anterior  Distance from DCIS to Anterior Margin 4 mm  Distance from DCIS to Posterior Margin Greater than: 20 mm  .  REGIONAL LYMPH NODES  Regional Lymph Node Status All regional lymph nodes negative for tumor  Total Number of Lymph Nodes Examined (sentinel and non-sentinel) 4  Number of [place] Nodes Examined 4  .  pTNM CLASSIFICATION (AJCC 8th Edition)  Reporting of pT, pN, and (when applicable) pM categories is based on information available to the pathologist at the time the report is issued. As  per the AJCC (Chapter 1, 8th Ed.) it is the managing physician's responsibility to establish the final pathologic stage based upon all pertinent  information, including but potentially not limited to this pathology report.  pT Category pT1c  pN Category pN0  N Suffix (sn)  .  SPECIAL STUDIES  Estrogen Receptor (ER) Status Positive (greater than 10% of cells demonstrate nuclear positivity)  Percentage of Cells with Nuclear Positivity %  Progesterone Receptor (PgR) Status Positive  Percentage of Cells with Nuclear Positivity 31-40%  HER2 (by immunohistochemistry) Negative (Score 1+)  Ki-67 Percentage of Positive Nuclei 35 %    Assessment & Plan   1.pT1cN0 grade 3 invasive ductal carcinoma left breast ER/CT positive HER2 negative with a Ki-67 of 35% bilat mastectomy and sentinel node biopsy with clear margins 10/31/2024  Oncotype DX score of 33  TCx4 to start on 12/13/2024 with Neulasta support via PICC line  Had fever and significant bone pains presumably from Neulasta on body although she could have had a coincidental viral infection though  COVID-negative  Consider switching to alternative growth factor next cycle  Cycle # 2TC with Neulasta on body on 1/3/2025  1/24/2025: Proceed with Cycle 3 TC with neulasta support. Overall tolerating this well. The headaches could be related to sinus congestion, advised trying flonase and reaching out if no improvement.  Persistent nausea for 10 days after each treatment will cut back on the Taxotere and add olanzapine 2.5 for 10 days-PICC line removed on 2/14/2025  Arimidex started in 3/25 no radiation needed    2.  Positive family history for breast cancer in a paternal aunt and grandmother 9 gene panel -48 gene panel neg    3.hypercholesterolemia on treatment    4.  Hypothyroidism on treatment    5.  Recent onset parosmia and dysgeusia since surgery?  Related to anesthesia versus asymptomatic COVID  1 week history of meralgia paresthetica right thigh?  Etiology had similar symptoms in the left thigh 20 years ago-May need to see neurology again    6.  Mild elevations of LFTs predating chemo-watch for now  Improved    7. Meralgia parasthetica on gabapentin  to see neurology-resolved on the right  Now with sciatic type discomfort on the left which is improved with gabapentin    Plan:  1.  Exemestane 25 mg daily for at least 5 years  2.  Return in 4 months to see me and call in the interim with any side effects    She does not qualify for CDK 4 6 based on her size of 16 mm despite grade 3 histology and the high Oncotype as she is node-negative

## 2025-07-10 ENCOUNTER — TELEPHONE (OUTPATIENT)
Dept: ONCOLOGY | Facility: CLINIC | Age: 65
End: 2025-07-10
Payer: COMMERCIAL

## 2025-07-10 DIAGNOSIS — E87.5 HYPERKALEMIA: Primary | ICD-10-CM

## 2025-07-10 NOTE — TELEPHONE ENCOUNTER
Pt returned my call re: elevated potassium. States she is not on any supplements containing potassium at this time. She is available for a lab redraw on July 15th, so I will send to scheduling to arrange a lab only visit on that day.

## 2025-07-10 NOTE — TELEPHONE ENCOUNTER
Called pt to schedule lab only apt 07-15-26,pt changed her mind and asked for 07-16-25 at [facility]. Apt confirmed for lab only apt at [facility]

## 2025-07-18 ENCOUNTER — LAB (OUTPATIENT)
Dept: LAB | Facility: HOSPITAL | Age: 65
End: 2025-07-18
Payer: COMMERCIAL

## 2025-07-18 DIAGNOSIS — E87.5 HYPERKALEMIA: ICD-10-CM

## 2025-07-18 LAB
ANION GAP SERPL CALCULATED.3IONS-SCNC: 8.8 MMOL/L (ref 5–15)
BUN SERPL-MCNC: 10.5 MG/DL (ref 8–23)
BUN/CREAT SERPL: 13.3 (ref 7–25)
CALCIUM SPEC-SCNC: 9.5 MG/DL (ref 8.6–10.5)
CHLORIDE SERPL-SCNC: 103 MMOL/L (ref 98–107)
CO2 SERPL-SCNC: 27.2 MMOL/L (ref 22–29)
CREAT SERPL-MCNC: 0.79 MG/DL (ref 0.57–1)
EGFRCR SERPLBLD CKD-EPI 2021: 84.7 ML/MIN/1.73
GLUCOSE SERPL-MCNC: 92 MG/DL (ref 65–99)
POTASSIUM SERPL-SCNC: 4.9 MMOL/L (ref 3.5–5.2)
SODIUM SERPL-SCNC: 139 MMOL/L (ref 136–145)

## 2025-07-18 PROCEDURE — 36415 COLL VENOUS BLD VENIPUNCTURE: CPT

## 2025-07-18 PROCEDURE — 80048 BASIC METABOLIC PNL TOTAL CA: CPT

## 2025-08-07 ENCOUNTER — TELEMEDICINE (OUTPATIENT)
Dept: PSYCHIATRY | Facility: HOSPITAL | Age: 65
End: 2025-08-07
Payer: COMMERCIAL

## 2025-08-07 DIAGNOSIS — F43.23 ADJUSTMENT DISORDER WITH MIXED ANXIETY AND DEPRESSED MOOD: ICD-10-CM

## 2025-08-07 DIAGNOSIS — C50.912 MALIGNANT NEOPLASM OF LEFT BREAST IN FEMALE, ESTROGEN RECEPTOR POSITIVE, UNSPECIFIED SITE OF BREAST: Primary | ICD-10-CM

## 2025-08-07 DIAGNOSIS — Z17.0 MALIGNANT NEOPLASM OF LEFT BREAST IN FEMALE, ESTROGEN RECEPTOR POSITIVE, UNSPECIFIED SITE OF BREAST: Primary | ICD-10-CM

## (undated) DEVICE — JACKSON-PRATT 100CC BULB RESERVOIR: Brand: CARDINAL HEALTH

## (undated) DEVICE — GLV SURG SENSICARE PI MIC PF SZ6.5 LF STRL

## (undated) DEVICE — PATIENT RETURN ELECTRODE, SINGLE-USE, CONTACT QUALITY MONITORING, ADULT, WITH 9FT CORD, FOR PATIENTS WEIGING OVER 33LBS. (15KG): Brand: MEGADYNE

## (undated) DEVICE — SUT MNCRYL PLS ANTIB UD 4/0 PS2 18IN

## (undated) DEVICE — KT DRP SPY/PHI W/ICG 25MG STRL

## (undated) DEVICE — PK UNIV COMPL 40

## (undated) DEVICE — SUT PDS 3/0 SH 27IN DYED Z316H

## (undated) DEVICE — GLV SURG BIOGEL LTX PF 6 1/2

## (undated) DEVICE — ELECTRD BLD EZ CLN MOD 2.5IN

## (undated) DEVICE — COVER,TABLE,44X90,STERILE: Brand: MEDLINE

## (undated) DEVICE — LOU MINOR PROCEDURE: Brand: MEDLINE INDUSTRIES, INC.

## (undated) DEVICE — SPONGE,LAP,18"X18",DLX,XR,ST,5/PK,40/PK: Brand: MEDLINE

## (undated) DEVICE — SUT MNCRYL 3/0 PS2 18IN MCP497G

## (undated) DEVICE — STPLR SKIN VISISTAT WD 35CT

## (undated) DEVICE — BIOPATCH™ ANTIMICROBIAL DRESSING WITH CHLORHEXIDINE GLUCONATE IS A HYDROPHILLIC POLYURETHANE ABSORPTIVE FOAM WITH CHLORHEXIDINE GLUCONATE (CHG) WHICH INHIBITS BACTERIAL GROWTH UNDER THE DRESSING. THE DRESSING IS INTENDED TO BE USED TO ABSORB EXUDATE, COVER A WOUND CAUSED BY VASCULAR AND NONVASCULAR PERCUTANEOUS MEDICAL DEVICES DURING SURGERY, AS WELL AS REDUCE LOCAL INFECTION AND COLONIZATION OF MICROORGANISMS.: Brand: BIOPATCH

## (undated) DEVICE — TOTAL TRAY, 16FR 10ML SIL FOLEY, URN: Brand: MEDLINE

## (undated) DEVICE — PENCL SMOKE/EVAC MEGADYNE TELESCP 10FT

## (undated) DEVICE — CONTN STRL 32OZ

## (undated) DEVICE — SYR LUERLOK 5CC

## (undated) DEVICE — SUT PDS 2/0 SH 27IN Z317H

## (undated) DEVICE — NDL HYPO PRECISIONGLIDE REG 25G 1 1/2

## (undated) DEVICE — EXTRCT STPL SKIN STRL BX/12

## (undated) DEVICE — ADHS SKIN SURG TISS VISC PREMIERPRO EXOFIN HI/VISC FAST/DRY

## (undated) DEVICE — APPL CHLORAPREP HI/LITE 26ML ORNG

## (undated) DEVICE — PROXIMATE RH ROTATING HEAD SKIN STAPLERS (35 WIDE) CONTAINS 35 STAINLESS STEEL STAPLES: Brand: PROXIMATE

## (undated) DEVICE — INTENDED FOR TISSUE SEPARATION, AND OTHER PROCEDURES THAT REQUIRE A SHARP SURGICAL BLADE TO PUNCTURE OR CUT.: Brand: BARD-PARKER ® STAINLESS STEEL BLADES

## (undated) DEVICE — LEGGINGS, PAIR, 31X48, STERILE: Brand: MEDLINE

## (undated) DEVICE — BNDG,ELSTC,MATRIX,STRL,6"X5YD,LF,HOOK&LP: Brand: MEDLINE

## (undated) DEVICE — CVR TRANSD CIV FLX TPR 11.9 TO 3.8X61CM

## (undated) DEVICE — TRAP FLD MINIVAC MEGADYNE 100ML

## (undated) DEVICE — SUT SILK 2/0 FS BLK 18IN 685G

## (undated) DEVICE — GLV SURG SENSICARE PI PF LF 7 GRN STRL

## (undated) DEVICE — TOWEL,OR,DSP,ST,BLUE,STD,4/PK,20PK/CS: Brand: MEDLINE

## (undated) DEVICE — NDL HYPO PRECISIONGLIDE/REG 18G 11/2 PNK

## (undated) DEVICE — ANTIBACTERIAL UNDYED BRAIDED (POLYGLACTIN 910), SYNTHETIC ABSORBABLE SUTURE: Brand: COATED VICRYL

## (undated) DEVICE — CONTAINER,SPECIMEN,OR STERILE,4OZ: Brand: MEDLINE

## (undated) DEVICE — Device

## (undated) DEVICE — 450 ML BOTTLE OF 0.05% CHLORHEXIDINE GLUCONATE IN 99.95% STERILE WATER FOR IRRIGATION, USP AND APPLICATOR.: Brand: IRRISEPT ANTIMICROBIAL WOUND LAVAGE